# Patient Record
Sex: MALE | Race: WHITE | Employment: OTHER | URBAN - NONMETROPOLITAN AREA
[De-identification: names, ages, dates, MRNs, and addresses within clinical notes are randomized per-mention and may not be internally consistent; named-entity substitution may affect disease eponyms.]

---

## 2017-01-11 ENCOUNTER — OFFICE VISIT (OUTPATIENT)
Dept: FAMILY MEDICINE CLINIC | Age: 62
End: 2017-01-11

## 2017-01-11 VITALS
HEART RATE: 75 BPM | BODY MASS INDEX: 32.38 KG/M2 | HEIGHT: 70 IN | SYSTOLIC BLOOD PRESSURE: 146 MMHG | WEIGHT: 226.2 LBS | DIASTOLIC BLOOD PRESSURE: 86 MMHG | TEMPERATURE: 98.1 F | RESPIRATION RATE: 12 BRPM

## 2017-01-11 DIAGNOSIS — J01.90 ACUTE RHINOSINUSITIS: Primary | ICD-10-CM

## 2017-01-11 PROBLEM — I10 ESSENTIAL HYPERTENSION: Chronic | Status: ACTIVE | Noted: 2017-01-11

## 2017-01-11 PROCEDURE — 99213 OFFICE O/P EST LOW 20 MIN: CPT | Performed by: NURSE PRACTITIONER

## 2017-01-11 RX ORDER — CEFUROXIME AXETIL 250 MG/1
250 TABLET ORAL 2 TIMES DAILY
Qty: 20 TABLET | Refills: 0 | Status: SHIPPED | OUTPATIENT
Start: 2017-01-11 | End: 2017-01-21

## 2017-01-11 RX ORDER — FLUTICASONE PROPIONATE 50 MCG
1 SPRAY, SUSPENSION (ML) NASAL DAILY
Qty: 1 BOTTLE | Refills: 3 | Status: SHIPPED | OUTPATIENT
Start: 2017-01-11 | End: 2017-09-25 | Stop reason: ALTCHOICE

## 2017-01-11 RX ORDER — LOSARTAN POTASSIUM 50 MG/1
50 TABLET ORAL DAILY
Qty: 90 TABLET | Refills: 3 | Status: CANCELLED | OUTPATIENT
Start: 2017-01-11

## 2017-01-18 ENCOUNTER — TELEPHONE (OUTPATIENT)
Dept: FAMILY MEDICINE CLINIC | Age: 62
End: 2017-01-18

## 2017-01-18 DIAGNOSIS — J01.90 ACUTE RHINOSINUSITIS: Primary | ICD-10-CM

## 2017-03-30 RX ORDER — LOSARTAN POTASSIUM 50 MG/1
TABLET ORAL
Qty: 90 TABLET | Refills: 3 | Status: SHIPPED | OUTPATIENT
Start: 2017-03-30 | End: 2017-07-10 | Stop reason: DRUGHIGH

## 2017-05-10 ENCOUNTER — OFFICE VISIT (OUTPATIENT)
Dept: FAMILY MEDICINE CLINIC | Age: 62
End: 2017-05-10

## 2017-05-10 VITALS
WEIGHT: 221.4 LBS | HEART RATE: 99 BPM | SYSTOLIC BLOOD PRESSURE: 138 MMHG | DIASTOLIC BLOOD PRESSURE: 77 MMHG | TEMPERATURE: 99.3 F | BODY MASS INDEX: 31.7 KG/M2 | HEIGHT: 70 IN | RESPIRATION RATE: 16 BRPM

## 2017-05-10 DIAGNOSIS — L02.412 ABSCESS OF AXILLA, LEFT: Primary | ICD-10-CM

## 2017-05-10 PROCEDURE — 99213 OFFICE O/P EST LOW 20 MIN: CPT | Performed by: FAMILY MEDICINE

## 2017-05-10 RX ORDER — SULFAMETHOXAZOLE AND TRIMETHOPRIM 800; 160 MG/1; MG/1
1 TABLET ORAL 2 TIMES DAILY
Qty: 20 TABLET | Refills: 0 | Status: SHIPPED | OUTPATIENT
Start: 2017-05-10 | End: 2017-05-20

## 2017-06-14 ENCOUNTER — OFFICE VISIT (OUTPATIENT)
Dept: FAMILY MEDICINE CLINIC | Age: 62
End: 2017-06-14

## 2017-06-14 VITALS
WEIGHT: 223 LBS | BODY MASS INDEX: 32 KG/M2 | RESPIRATION RATE: 12 BRPM | DIASTOLIC BLOOD PRESSURE: 92 MMHG | TEMPERATURE: 98.5 F | HEART RATE: 88 BPM | SYSTOLIC BLOOD PRESSURE: 160 MMHG

## 2017-06-14 DIAGNOSIS — R09.82 PND (POST-NASAL DRIP): Primary | ICD-10-CM

## 2017-06-14 PROCEDURE — 99213 OFFICE O/P EST LOW 20 MIN: CPT | Performed by: NURSE PRACTITIONER

## 2017-06-14 RX ORDER — FLUTICASONE PROPIONATE 50 MCG
2 SPRAY, SUSPENSION (ML) NASAL DAILY
Qty: 1 BOTTLE | Refills: 3 | Status: SHIPPED | OUTPATIENT
Start: 2017-06-14 | End: 2017-09-25 | Stop reason: ALTCHOICE

## 2017-06-28 ENCOUNTER — TELEPHONE (OUTPATIENT)
Dept: FAMILY MEDICINE CLINIC | Age: 62
End: 2017-06-28

## 2017-06-28 ENCOUNTER — NURSE ONLY (OUTPATIENT)
Dept: FAMILY MEDICINE CLINIC | Age: 62
End: 2017-06-28

## 2017-06-28 VITALS — DIASTOLIC BLOOD PRESSURE: 82 MMHG | SYSTOLIC BLOOD PRESSURE: 150 MMHG

## 2017-06-28 DIAGNOSIS — I10 ESSENTIAL HYPERTENSION: Primary | ICD-10-CM

## 2017-07-10 DIAGNOSIS — I10 ESSENTIAL HYPERTENSION: Primary | Chronic | ICD-10-CM

## 2017-07-10 RX ORDER — LOSARTAN POTASSIUM 100 MG/1
100 TABLET ORAL DAILY
Qty: 90 TABLET | Refills: 3 | Status: SHIPPED | OUTPATIENT
Start: 2017-07-10 | End: 2018-06-24 | Stop reason: SDUPTHER

## 2017-08-03 ENCOUNTER — TELEPHONE (OUTPATIENT)
Dept: FAMILY MEDICINE CLINIC | Age: 62
End: 2017-08-03

## 2017-08-03 ENCOUNTER — OFFICE VISIT (OUTPATIENT)
Dept: FAMILY MEDICINE CLINIC | Age: 62
End: 2017-08-03
Payer: COMMERCIAL

## 2017-08-03 VITALS
DIASTOLIC BLOOD PRESSURE: 74 MMHG | WEIGHT: 231.2 LBS | HEART RATE: 80 BPM | RESPIRATION RATE: 12 BRPM | TEMPERATURE: 99.3 F | BODY MASS INDEX: 33.1 KG/M2 | SYSTOLIC BLOOD PRESSURE: 136 MMHG | HEIGHT: 70 IN

## 2017-08-03 DIAGNOSIS — R73.03 PREDIABETES: Chronic | ICD-10-CM

## 2017-08-03 DIAGNOSIS — I10 ESSENTIAL HYPERTENSION: Primary | Chronic | ICD-10-CM

## 2017-08-03 DIAGNOSIS — E55.9 VITAMIN D DEFICIENCY: ICD-10-CM

## 2017-08-03 DIAGNOSIS — M79.641 RIGHT HAND PAIN: ICD-10-CM

## 2017-08-03 PROCEDURE — 99214 OFFICE O/P EST MOD 30 MIN: CPT | Performed by: FAMILY MEDICINE

## 2017-08-03 ASSESSMENT — PATIENT HEALTH QUESTIONNAIRE - PHQ9
2. FEELING DOWN, DEPRESSED OR HOPELESS: 0
1. LITTLE INTEREST OR PLEASURE IN DOING THINGS: 0
SUM OF ALL RESPONSES TO PHQ9 QUESTIONS 1 & 2: 0
SUM OF ALL RESPONSES TO PHQ QUESTIONS 1-9: 0

## 2017-08-04 LAB — PROSTATE SPECIFIC ANTIGEN: 4.4 NG/ML

## 2017-08-15 ENCOUNTER — OFFICE VISIT (OUTPATIENT)
Dept: FAMILY MEDICINE CLINIC | Age: 62
End: 2017-08-15
Payer: COMMERCIAL

## 2017-08-15 VITALS
HEART RATE: 80 BPM | DIASTOLIC BLOOD PRESSURE: 76 MMHG | WEIGHT: 228 LBS | RESPIRATION RATE: 10 BRPM | HEIGHT: 69 IN | SYSTOLIC BLOOD PRESSURE: 126 MMHG | TEMPERATURE: 98.6 F | BODY MASS INDEX: 33.77 KG/M2

## 2017-08-15 DIAGNOSIS — L02.214 ABSCESS OF GROIN, RIGHT: Primary | ICD-10-CM

## 2017-08-15 PROCEDURE — 99213 OFFICE O/P EST LOW 20 MIN: CPT | Performed by: FAMILY MEDICINE

## 2017-08-15 RX ORDER — SULFAMETHOXAZOLE AND TRIMETHOPRIM 800; 160 MG/1; MG/1
1 TABLET ORAL 2 TIMES DAILY
Qty: 20 TABLET | Refills: 0 | Status: SHIPPED | OUTPATIENT
Start: 2017-08-15 | End: 2017-08-25

## 2017-08-25 ENCOUNTER — PROCEDURE VISIT (OUTPATIENT)
Dept: NEUROLOGY | Age: 62
End: 2017-08-25
Payer: COMMERCIAL

## 2017-08-25 DIAGNOSIS — G56.01 RIGHT CARPAL TUNNEL SYNDROME: Primary | ICD-10-CM

## 2017-08-25 DIAGNOSIS — R20.0 NUMBNESS OF RIGHT HAND: ICD-10-CM

## 2017-08-25 DIAGNOSIS — M79.641 RIGHT HAND PAIN: ICD-10-CM

## 2017-08-25 PROCEDURE — 95909 NRV CNDJ TST 5-6 STUDIES: CPT | Performed by: PSYCHIATRY & NEUROLOGY

## 2017-08-25 PROCEDURE — 95886 MUSC TEST DONE W/N TEST COMP: CPT | Performed by: PSYCHIATRY & NEUROLOGY

## 2017-08-30 ENCOUNTER — TELEPHONE (OUTPATIENT)
Dept: FAMILY MEDICINE CLINIC | Age: 62
End: 2017-08-30

## 2017-09-25 ENCOUNTER — OFFICE VISIT (OUTPATIENT)
Dept: FAMILY MEDICINE CLINIC | Age: 62
End: 2017-09-25
Payer: COMMERCIAL

## 2017-09-25 ENCOUNTER — TELEPHONE (OUTPATIENT)
Dept: FAMILY MEDICINE CLINIC | Age: 62
End: 2017-09-25

## 2017-09-25 VITALS
HEIGHT: 69 IN | WEIGHT: 225 LBS | TEMPERATURE: 98.2 F | DIASTOLIC BLOOD PRESSURE: 77 MMHG | HEART RATE: 80 BPM | BODY MASS INDEX: 33.33 KG/M2 | SYSTOLIC BLOOD PRESSURE: 132 MMHG | RESPIRATION RATE: 12 BRPM

## 2017-09-25 DIAGNOSIS — Z12.11 SCREENING FOR COLON CANCER: ICD-10-CM

## 2017-09-25 DIAGNOSIS — G56.01 CARPAL TUNNEL SYNDROME OF RIGHT WRIST: ICD-10-CM

## 2017-09-25 DIAGNOSIS — K64.0 FIRST DEGREE HEMORRHOIDS: Primary | ICD-10-CM

## 2017-09-25 PROCEDURE — 99214 OFFICE O/P EST MOD 30 MIN: CPT | Performed by: FAMILY MEDICINE

## 2017-09-27 ENCOUNTER — TELEPHONE (OUTPATIENT)
Dept: FAMILY MEDICINE CLINIC | Age: 62
End: 2017-09-27

## 2017-09-27 DIAGNOSIS — Z53.20 COLONOSCOPY REFUSED: ICD-10-CM

## 2017-09-27 DIAGNOSIS — Z12.11 SCREENING FOR COLON CANCER: Primary | ICD-10-CM

## 2017-09-27 DIAGNOSIS — K64.9 HEMORRHOIDS, UNSPECIFIED HEMORRHOID TYPE: ICD-10-CM

## 2017-09-27 RX ORDER — MUPIROCIN CALCIUM 20 MG/G
CREAM TOPICAL
Qty: 1 TUBE | Refills: 0 | Status: SHIPPED | OUTPATIENT
Start: 2017-09-27 | End: 2017-10-27

## 2017-09-29 ENCOUNTER — TELEPHONE (OUTPATIENT)
Dept: FAMILY MEDICINE CLINIC | Age: 62
End: 2017-09-29

## 2017-10-12 DIAGNOSIS — K64.0 FIRST DEGREE HEMORRHOIDS: ICD-10-CM

## 2017-10-12 NOTE — TELEPHONE ENCOUNTER
Mike Dominguez called requesting a refill on the following medications:  Requested Prescriptions     Pending Prescriptions Disp Refills    hydrocortisone (ANUSOL-HC) 2.5 % rectal cream 1 Tube 0     Sig: Place rectally 2 times daily for the next few days     Pharmacy verified:  [email or web address]      Date of last visit: 9/25/17  Date of next visit (if applicable): Visit date not found        Date of last fill and quantity (to be completed by clinical staff)  [facility] name: [facility]

## 2017-10-25 ENCOUNTER — TELEPHONE (OUTPATIENT)
Dept: FAMILY MEDICINE CLINIC | Age: 62
End: 2017-10-25

## 2017-12-01 LAB
AVERAGE GLUCOSE: NORMAL
CHOLESTEROL, TOTAL: 170 MG/DL
CHOLESTEROL/HDL RATIO: NORMAL
CREATININE, URINE: 15.5
HBA1C MFR BLD: 5.9 %
HDLC SERPL-MCNC: 41 MG/DL (ref 35–70)
LDL CHOLESTEROL CALCULATED: 112 MG/DL (ref 0–160)
MICROALBUMIN/CREAT 24H UR: 16.9 MG/G{CREAT}
MICROALBUMIN/CREAT UR-RTO: 108.7
TRIGL SERPL-MCNC: 87 MG/DL
VLDLC SERPL CALC-MCNC: 17 MG/DL

## 2017-12-04 ENCOUNTER — TELEPHONE (OUTPATIENT)
Dept: FAMILY MEDICINE CLINIC | Age: 62
End: 2017-12-04

## 2017-12-04 NOTE — TELEPHONE ENCOUNTER
----- Message from Nell Royal, DO sent at 12/4/2017 12:05 PM EST -----  Please let pt know that labs are stable and appropriate. No concerning findings. Let me know if questions, thanks!

## 2017-12-14 ENCOUNTER — NURSE ONLY (OUTPATIENT)
Dept: FAMILY MEDICINE CLINIC | Age: 62
End: 2017-12-14
Payer: COMMERCIAL

## 2017-12-14 DIAGNOSIS — Z23 NEED FOR INFLUENZA VACCINATION: Primary | ICD-10-CM

## 2017-12-14 PROCEDURE — 90686 IIV4 VACC NO PRSV 0.5 ML IM: CPT | Performed by: FAMILY MEDICINE

## 2017-12-14 PROCEDURE — 90471 IMMUNIZATION ADMIN: CPT | Performed by: FAMILY MEDICINE

## 2017-12-14 NOTE — PROGRESS NOTES
After obtaining consent, and per orders of Dr. Florentino Bailey , injection of Flu Vaccine 0.5ml I.M. given in left deltoid by Anay Hughes LPN. Patient instructed to report any adverse reaction to me immediately. Most recent Vaccine Information Sheet dated 08/07/15   given to pt.

## 2018-01-18 ENCOUNTER — TELEPHONE (OUTPATIENT)
Dept: FAMILY MEDICINE CLINIC | Age: 63
End: 2018-01-18

## 2018-01-18 NOTE — TELEPHONE ENCOUNTER
2nd attempt to contact the pt re:overdue labs Dr Sandy Nguyen ordered on 9/27/17. HIPAA form is up to date, order mailed.

## 2018-02-01 ENCOUNTER — TELEPHONE (OUTPATIENT)
Dept: FAMILY MEDICINE CLINIC | Age: 63
End: 2018-02-01
Payer: COMMERCIAL

## 2018-02-01 DIAGNOSIS — Z53.20 COLONOSCOPY REFUSED: Primary | ICD-10-CM

## 2018-02-01 LAB
CONTROL: PRESENT
HEMOCCULT STL QL: NEGATIVE

## 2018-02-01 PROCEDURE — 82274 ASSAY TEST FOR BLOOD FECAL: CPT | Performed by: FAMILY MEDICINE

## 2018-02-01 NOTE — TELEPHONE ENCOUNTER
Patient returned FIT test to office. Test performed and results were negative. Results recorded to pt's chart and HM updated. Encounter routed to Dr Ashkan Fernandez for further review.

## 2018-03-30 DIAGNOSIS — K64.0 FIRST DEGREE HEMORRHOIDS: ICD-10-CM

## 2018-06-13 ENCOUNTER — OFFICE VISIT (OUTPATIENT)
Dept: FAMILY MEDICINE CLINIC | Age: 63
End: 2018-06-13
Payer: COMMERCIAL

## 2018-06-13 VITALS
SYSTOLIC BLOOD PRESSURE: 128 MMHG | DIASTOLIC BLOOD PRESSURE: 74 MMHG | BODY MASS INDEX: 34.39 KG/M2 | RESPIRATION RATE: 12 BRPM | WEIGHT: 232.2 LBS | TEMPERATURE: 98.2 F | HEIGHT: 69 IN | HEART RATE: 94 BPM

## 2018-06-13 DIAGNOSIS — K64.9 HEMORRHOIDS, UNSPECIFIED HEMORRHOID TYPE: Primary | ICD-10-CM

## 2018-06-13 PROCEDURE — 99213 OFFICE O/P EST LOW 20 MIN: CPT | Performed by: FAMILY MEDICINE

## 2018-06-24 DIAGNOSIS — I10 ESSENTIAL HYPERTENSION: Chronic | ICD-10-CM

## 2018-06-25 RX ORDER — LOSARTAN POTASSIUM 100 MG/1
TABLET ORAL
Qty: 90 TABLET | Refills: 3 | Status: SHIPPED | OUTPATIENT
Start: 2018-06-25 | End: 2019-08-23 | Stop reason: SDUPTHER

## 2018-08-01 ENCOUNTER — TELEPHONE (OUTPATIENT)
Dept: FAMILY MEDICINE CLINIC | Age: 63
End: 2018-08-01

## 2018-08-01 NOTE — TELEPHONE ENCOUNTER
Patient is due to see Dr Lukasz Thomson on 8/3/18 for his yearly appointment. He is calling in asking if he needs to have any bloodwork done prior to the appointment. Please call him back to advise, and please leave him a detailed msg if he can't answer.

## 2018-08-03 ENCOUNTER — OFFICE VISIT (OUTPATIENT)
Dept: FAMILY MEDICINE CLINIC | Age: 63
End: 2018-08-03
Payer: COMMERCIAL

## 2018-08-03 VITALS
DIASTOLIC BLOOD PRESSURE: 70 MMHG | WEIGHT: 230 LBS | HEART RATE: 88 BPM | SYSTOLIC BLOOD PRESSURE: 132 MMHG | RESPIRATION RATE: 12 BRPM | HEIGHT: 70 IN | TEMPERATURE: 98.6 F | BODY MASS INDEX: 32.93 KG/M2

## 2018-08-03 DIAGNOSIS — R73.03 PREDIABETES: Chronic | ICD-10-CM

## 2018-08-03 DIAGNOSIS — E66.9 OBESITY (BMI 30-39.9): ICD-10-CM

## 2018-08-03 DIAGNOSIS — I10 ESSENTIAL HYPERTENSION: Primary | Chronic | ICD-10-CM

## 2018-08-03 DIAGNOSIS — Z12.5 SPECIAL SCREENING FOR MALIGNANT NEOPLASM OF PROSTATE: ICD-10-CM

## 2018-08-03 DIAGNOSIS — R73.9 HYPERGLYCEMIA: ICD-10-CM

## 2018-08-03 DIAGNOSIS — E55.9 VITAMIN D DEFICIENCY: ICD-10-CM

## 2018-08-03 PROCEDURE — 99214 OFFICE O/P EST MOD 30 MIN: CPT | Performed by: FAMILY MEDICINE

## 2018-08-03 NOTE — PATIENT INSTRUCTIONS
LAB INSTRUCTIONS:    Please complete labs within 6 week(s). Please fast for 8 hours prior to lab collection. The clinic will call you within 1 week of collection. If you have not heard from us within that amount of time, please call us at [phone]. Patient Education        High Blood Pressure: Care Instructions  Your Care Instructions    If your blood pressure is usually above 130/80, you have high blood pressure, or hypertension. That means the top number is 130 or higher or the bottom number is 80 or higher, or both. Despite what a lot of people think, high blood pressure usually doesn't cause headaches or make you feel dizzy or lightheaded. It usually has no symptoms. But it does increase your risk for heart attack, stroke, and kidney or eye damage. The higher your blood pressure, the more your risk increases. Your doctor will give you a goal for your blood pressure. Your goal will be based on your health and your age. Lifestyle changes, such as eating healthy and being active, are always important to help lower blood pressure. You might also take medicine to reach your blood pressure goal.  Follow-up care is a key part of your treatment and safety. Be sure to make and go to all appointments, and call your doctor if you are having problems. It's also a good idea to know your test results and keep a list of the medicines you take. How can you care for yourself at home? Medical treatment  · If you stop taking your medicine, your blood pressure will go back up. You may take one or more types of medicine to lower your blood pressure. Be safe with medicines. Take your medicine exactly as prescribed. Call your doctor if you think you are having a problem with your medicine. · Talk to your doctor before you start taking aspirin every day. Aspirin can help certain people lower their risk of a heart attack or stroke.  But taking aspirin isn't right for everyone, because it can cause serious bleeding. · See your doctor regularly. You may need to see the doctor more often at first or until your blood pressure comes down. · If you are taking blood pressure medicine, talk to your doctor before you take decongestants or anti-inflammatory medicine, such as ibuprofen. Some of these medicines can raise blood pressure. · Learn how to check your blood pressure at home. Lifestyle changes  · Stay at a healthy weight. This is especially important if you put on weight around the waist. Losing even 10 pounds can help you lower your blood pressure. · If your doctor recommends it, get more exercise. Walking is a good choice. Bit by bit, increase the amount you walk every day. Try for at least 30 minutes on most days of the week. You also may want to swim, bike, or do other activities. · Avoid or limit alcohol. Talk to your doctor about whether you can drink any alcohol. · Try to limit how much sodium you eat to less than 2,300 milligrams (mg) a day. Your doctor may ask you to try to eat less than 1,500 mg a day. · Eat plenty of fruits (such as bananas and oranges), vegetables, legumes, whole grains, and low-fat dairy products. · Lower the amount of saturated fat in your diet. Saturated fat is found in animal products such as milk, cheese, and meat. Limiting these foods may help you lose weight and also lower your risk for heart disease. · Do not smoke. Smoking increases your risk for heart attack and stroke. If you need help quitting, talk to your doctor about stop-smoking programs and medicines. These can increase your chances of quitting for good. When should you call for help? Call 911 anytime you think you may need emergency care. This may mean having symptoms that suggest that your blood pressure is causing a serious heart or blood vessel problem. Your blood pressure may be over 180/110.   For example, call 911 if:    · You have symptoms of a heart attack.  These may include:  ¨ Chest pain or pressure, Health. If you have questions about a medical condition or this instruction, always ask your healthcare professional. [place] [address] any warranty or liability for your use of this information.

## 2018-08-03 NOTE — PROGRESS NOTES
Chief Complaint   Patient presents with    Follow-up     1 year f/u chronic medical conditions        History obtained from the patient. SUBJECTIVE:  Inez Perez is a 57 y.o. male that presents today for       -01. HTN:    HPI:    Taking meds as prescribed ?: yes  Tolerating well ?: yes  Side Effects ?: denies  BP at home ?: <140/90  Working on TLCS ?: yes  Chest Pain/SOB/Palpitations? denies    BP Readings from Last 3 Encounters:   08/03/18 132/70   06/13/18 128/74   09/25/17 132/77       -02. PreDM/Obesity: working on wt loss. Diet a bit better, due for labs.       -03. Vit D def: hx of vit d def, due for f/u labs. Age/Gender Health Maintenance    Lipid -   Lab Results   Component Value Date    CHOL 170 12/01/2017    CHOL 156 06/22/2016    CHOL 164 10/07/2015     Lab Results   Component Value Date    TRIG 87 12/01/2017    TRIG 65 06/22/2016    TRIG 53 10/07/2015     Lab Results   Component Value Date    HDL 41 12/01/2017    HDL 46 06/22/2016    HDL 49 10/07/2015     Lab Results   Component Value Date    LDLCALC 112 12/01/2017    LDLCALC 97 06/22/2016    LDLCALC 104 10/07/2015     Lab Results   Component Value Date    VLDL 17 12/01/2017    VLDL 13 06/22/2016    VLDL 11 10/07/2015     No results found for: CHOLHDLRATIO      DM Screen - 113 (OCT 2015)/110 (JUNE 2016)/100 (DEC 2017)    With a1c 6.1 (SEPT 2017)    TSH - 4.090 (DEC 2017)    Lab Results   Component Value Date    TSH 1.590 06/22/2016       Colon Cancer Screening - NEG FIT FEB 2018  Lung Cancer Screening (Age 54 to [de-identified] with 30 pack year hx, current smoker or quit within past 15 years) - never smoker    Tetanus - UTD July 2015  Influenza Vaccine - Candidate FALL 2017  Pneumonia Vaccine - Age 72  Zostavax - due, pt to check with insurance.      PSA testing discussion -   Lab Results   Component Value Date    PSA 4.4 08/04/2017    PSA 3.6 06/22/2016    PSA 4.00 07/27/2013     AAA Screening - never smoker    Falls screening - N/A      Current Outpatient Prescriptions   Medication Sig Dispense Refill    losartan (COZAAR) 100 MG tablet TAKE 1 TABLET DAILY 90 tablet 3    PROCTO-MED HC 2.5 % rectal cream PLACE CREAM RECTALLY TWICE DAILY FOR THE NEXT FEW DAYS 1 Tube 2    Multiple Vitamins-Minerals (MULTIVITAMIN PO) Take by mouth daily      nystatin (MYCOSTATIN) 474406 UNIT/GM cream Apply topically as needed Apply topically 2 times daily.  VIAGRA 100 MG TABS        No current facility-administered medications for this visit. No orders of the defined types were placed in this encounter. All medications reviewed and reconciled, including OTC and herbal medications. Updated list given to patient. Patient Active Problem List   Diagnosis    Dermatitis    Hypertension    Erectile dysfunction    Benign prostatic hyperplasia    Prediabetes    Vitamin D deficiency    Right hand pain    Carpal tunnel syndrome of right wrist         Past Medical History:   Diagnosis Date    BPH (benign prostatic hypertrophy) 7/17/2015    Dermatitis     follows with Dr. Deja trevino Erectile dysfunction     Hypertension     Prediabetes 7/29/2016         Past Surgical History:   Procedure Laterality Date    PROSTATE SURGERY  2009             No Known Allergies      Social History     Social History    Marital status:      Spouse name: N/A    Number of children: N/A    Years of education: N/A     Occupational History    Not on file.      Social History Main Topics    Smoking status: Never Smoker    Smokeless tobacco: Never Used    Alcohol use No    Drug use: No    Sexual activity: Not on file     Other Topics Concern    Not on file     Social History Narrative    No narrative on file       Family History   Problem Relation Age of Onset    High Blood Pressure Mother     Cancer Father         Lung Cancer    Colon Cancer Neg Hx     Prostate Cancer Neg Hx          I have reviewed the patient's past medical history, past surgical history, allergies, medications, social and family history and I have made updates where appropriate. Review of Systems  Positive responses are highlighted in bold    Constitutional:  Fever, Chills, Night Sweats, Fatigue, Unexpected changes in weight  HENT:  Ear pain, Tinnitus, Nosebleeds, Trouble swallowing, Hearing loss, Sore throat  Cardiovascular:  Chest Pain, Palpitations, Orthopnea, Paroxysmal Nocturnal Dyspnea  Respiratory:  Cough, Wheezing, Shortness of breath, Chest tightness, Apnea  Gastrointestinal:  Nausea, Vomiting, Diarrhea, Constipation, Heartburn, Blood in stool  Genitourinary:  Difficulty or painful urination, Flank pain, Change in frequency, Urgency  Skin:  Color change, Rash, Itching, Wound  Musculoskeletal:  Joint pain, Back pain, Gait problems, Joint swelling, Myalgias  Neurological:  Dizziness, Headaches, Presyncope, Numbness, Seizures, Tremors  Endocrine:  Heat Intolerance, Cold Intolerance, Polydipsia, Polyphagia, Polyuria      PHYSICAL EXAM:  Vitals:    08/03/18 0743 08/03/18 0753   BP: (!) 152/70 132/70   Pulse: 88    Resp: 12    Temp: 98.6 °F (37 °C)    Weight: 230 lb (104.3 kg)    Height: 5' 10\" (1.778 m)      Body mass index is 33 kg/m². Pain Score:   0 - No pain    VS Reviewed  General Appearance: A&O x 3, No acute distress,well developed and well- nourished  Eyes: pupils equal, round, and reactive to light, extraocular eye movements intact, conjunctivae and eye lids without erythema  ENT: external ear and ear canal clear bilaterally, TMs intact and regular, nose without deformity, nasal mucosa and turbinates normal without polyps, oropharynx normal, dentition is normal for age  Neck: supple and non-tender without mass, no thyromegaly or thyroid nodules, no cervical lymphadenopathy  Pulmonary/Chest: clear to auscultation bilaterally- no wheezes, rales or rhonchi, normal air movement, no respiratory distress or retractions  Cardiovascular: S1 and S2 auscultated w/ RRR.  No murmurs, rubs, clicks, or gallops, distal pulses intact. Abdomen: soft, non-tender, non-distended, bowl sounds physiologic,  no rebound or guarding, no masses or hernias noted. Liver and spleen without enlargement. Extremities: no cyanosis, clubbing or edema of the lower extremities. Skin: warm and dry, no rash or erythema      ASSESSMENT & PLAN  1. Essential hypertension    At goal  con't meds  Due for labs, ordered  F/u annually and prn    - Comprehensive Metabolic Panel; Future  - Lipid Panel; Future  - Microalbumin / Creatinine Urine Ratio; Future  - TSH with Reflex; Future    2. Prediabetes    con't to work on life style changes  Labs ordered    - Comprehensive Metabolic Panel; Future  - Hemoglobin A1C; Future    3. Hyperglycemia    - Comprehensive Metabolic Panel; Future  - Hemoglobin A1C; Future    4. Obesity (BMI 30-39.9)    con't with wt loss strategies    5. Vitamin D deficiency    Check labs  txt if low    - Vitamin D 25 Hydroxy; Future    6. Special screening for malignant neoplasm of prostate    Discussed prostate screening guidelines and with shared decision making, he elects to proceed with psa    - Psa screening; Future      DISPOSITION    Return in about 1 year (around 8/3/2019) for follow-up on chronic medical conditions, sooner as needed. Mak Collins released without restrictions. Future Appointments  Date Time Provider Ju [place]   8/9/2019 7:40 AM Dolly Valiente DO [address] received counseling on the following healthy behaviors: nutrition, exercise and medication adherence    Patient given educational materials on: See Attached    I have instructed Mak Collins to complete a self tracking handout on Blood Pressures  and Weights and instructed them to bring it with them to his next appointment. Barriers to learning and self management: none    Discussed use, benefit, and side effects of prescribed medications.   Barriers to medication compliance addressed. All patient questions answered. Pt voiced understanding.        Electronically signed by Rogerio Marks DO on 8/3/2018 at 8:00 AM

## 2018-08-10 LAB
AVERAGE GLUCOSE: NORMAL
CHOLESTEROL, TOTAL: 166 MG/DL
CHOLESTEROL/HDL RATIO: NORMAL
HBA1C MFR BLD: 6 %
HDLC SERPL-MCNC: 36 MG/DL (ref 35–70)
LDL CHOLESTEROL CALCULATED: 109 MG/DL (ref 0–160)
PROSTATE SPECIFIC ANTIGEN: 5.3 NG/ML
TRIGL SERPL-MCNC: 104 MG/DL
VLDLC SERPL CALC-MCNC: 21 MG/DL

## 2018-08-13 DIAGNOSIS — R73.9 HYPERGLYCEMIA: ICD-10-CM

## 2018-08-13 DIAGNOSIS — R73.03 PREDIABETES: Primary | ICD-10-CM

## 2018-08-14 ENCOUNTER — TELEPHONE (OUTPATIENT)
Dept: FAMILY MEDICINE CLINIC | Age: 63
End: 2018-08-14

## 2018-08-14 NOTE — TELEPHONE ENCOUNTER
Left detailed msg informing pt of results. (ok per signed HIPAA) lab mailed to pt. [facility] message sent as well.

## 2018-08-14 NOTE — TELEPHONE ENCOUNTER
----- Message from Gabby Powers DO sent at 8/13/2018  7:54 PM EDT -----  Please let pt know that labs overall good. Blood sugar still in the prediabetic range, but stable. Recommend we repeat FBS/a1c in 6 months, fasting. Let me know if questions, thanks!

## 2018-10-25 ENCOUNTER — TELEPHONE (OUTPATIENT)
Dept: FAMILY MEDICINE CLINIC | Age: 63
End: 2018-10-25

## 2018-11-08 ENCOUNTER — NURSE ONLY (OUTPATIENT)
Dept: FAMILY MEDICINE CLINIC | Age: 63
End: 2018-11-08
Payer: MEDICARE

## 2018-11-08 PROCEDURE — 90686 IIV4 VACC NO PRSV 0.5 ML IM: CPT | Performed by: FAMILY MEDICINE

## 2018-11-08 PROCEDURE — G0008 ADMIN INFLUENZA VIRUS VAC: HCPCS | Performed by: FAMILY MEDICINE

## 2019-02-18 ENCOUNTER — TELEPHONE (OUTPATIENT)
Dept: FAMILY MEDICINE CLINIC | Age: 64
End: 2019-02-18

## 2019-02-18 ENCOUNTER — NURSE ONLY (OUTPATIENT)
Dept: FAMILY MEDICINE CLINIC | Age: 64
End: 2019-02-18

## 2019-02-18 VITALS — SYSTOLIC BLOOD PRESSURE: 170 MMHG | HEART RATE: 82 BPM | DIASTOLIC BLOOD PRESSURE: 90 MMHG

## 2019-02-18 DIAGNOSIS — I10 ESSENTIAL HYPERTENSION: Primary | Chronic | ICD-10-CM

## 2019-02-18 DIAGNOSIS — I10 ESSENTIAL HYPERTENSION: Primary | ICD-10-CM

## 2019-02-18 RX ORDER — AMLODIPINE BESYLATE 5 MG/1
5 TABLET ORAL DAILY
Qty: 90 TABLET | Refills: 1 | Status: SHIPPED | OUTPATIENT
Start: 2019-02-18 | End: 2019-03-29 | Stop reason: DRUGHIGH

## 2019-03-22 ENCOUNTER — TELEPHONE (OUTPATIENT)
Dept: FAMILY MEDICINE CLINIC | Age: 64
End: 2019-03-22

## 2019-03-29 ENCOUNTER — TELEPHONE (OUTPATIENT)
Dept: FAMILY MEDICINE CLINIC | Age: 64
End: 2019-03-29

## 2019-03-29 ENCOUNTER — NURSE ONLY (OUTPATIENT)
Dept: FAMILY MEDICINE CLINIC | Age: 64
End: 2019-03-29

## 2019-03-29 VITALS — DIASTOLIC BLOOD PRESSURE: 80 MMHG | SYSTOLIC BLOOD PRESSURE: 144 MMHG

## 2019-03-29 DIAGNOSIS — I10 ESSENTIAL HYPERTENSION: Primary | ICD-10-CM

## 2019-03-29 RX ORDER — AMLODIPINE BESYLATE 5 MG/1
10 TABLET ORAL DAILY
COMMUNITY
End: 2019-04-24 | Stop reason: SDUPTHER

## 2019-04-05 ENCOUNTER — OFFICE VISIT (OUTPATIENT)
Dept: FAMILY MEDICINE CLINIC | Age: 64
End: 2019-04-05
Payer: MEDICARE

## 2019-04-05 VITALS
BODY MASS INDEX: 33.77 KG/M2 | SYSTOLIC BLOOD PRESSURE: 138 MMHG | HEART RATE: 88 BPM | TEMPERATURE: 97.4 F | WEIGHT: 228 LBS | DIASTOLIC BLOOD PRESSURE: 70 MMHG | HEIGHT: 69 IN | RESPIRATION RATE: 16 BRPM

## 2019-04-05 DIAGNOSIS — J01.90 ACUTE RHINOSINUSITIS: Primary | ICD-10-CM

## 2019-04-05 DIAGNOSIS — I10 ESSENTIAL HYPERTENSION: Chronic | ICD-10-CM

## 2019-04-05 PROCEDURE — G8427 DOCREV CUR MEDS BY ELIG CLIN: HCPCS | Performed by: FAMILY MEDICINE

## 2019-04-05 PROCEDURE — 3017F COLORECTAL CA SCREEN DOC REV: CPT | Performed by: FAMILY MEDICINE

## 2019-04-05 PROCEDURE — 4040F PNEUMOC VAC/ADMIN/RCVD: CPT | Performed by: FAMILY MEDICINE

## 2019-04-05 PROCEDURE — 1036F TOBACCO NON-USER: CPT | Performed by: FAMILY MEDICINE

## 2019-04-05 PROCEDURE — 1123F ACP DISCUSS/DSCN MKR DOCD: CPT | Performed by: FAMILY MEDICINE

## 2019-04-05 PROCEDURE — G8417 CALC BMI ABV UP PARAM F/U: HCPCS | Performed by: FAMILY MEDICINE

## 2019-04-05 PROCEDURE — 99213 OFFICE O/P EST LOW 20 MIN: CPT | Performed by: FAMILY MEDICINE

## 2019-04-05 RX ORDER — BENZONATATE 100 MG/1
CAPSULE ORAL
Qty: 60 CAPSULE | Refills: 0 | Status: SHIPPED | OUTPATIENT
Start: 2019-04-05 | End: 2019-04-15

## 2019-04-05 RX ORDER — DOXYCYCLINE HYCLATE 100 MG/1
100 CAPSULE ORAL 2 TIMES DAILY
Qty: 20 CAPSULE | Refills: 0 | Status: SHIPPED | OUTPATIENT
Start: 2019-04-05 | End: 2019-04-15

## 2019-04-05 RX ORDER — FLUTICASONE PROPIONATE 50 MCG
1 SPRAY, SUSPENSION (ML) NASAL DAILY
Qty: 1 BOTTLE | Refills: 0 | Status: SHIPPED | OUTPATIENT
Start: 2019-04-05 | End: 2019-11-25 | Stop reason: SDUPTHER

## 2019-04-05 ASSESSMENT — PATIENT HEALTH QUESTIONNAIRE - PHQ9
2. FEELING DOWN, DEPRESSED OR HOPELESS: 0
SUM OF ALL RESPONSES TO PHQ QUESTIONS 1-9: 0
1. LITTLE INTEREST OR PLEASURE IN DOING THINGS: 0
SUM OF ALL RESPONSES TO PHQ9 QUESTIONS 1 & 2: 0
SUM OF ALL RESPONSES TO PHQ QUESTIONS 1-9: 0

## 2019-04-05 NOTE — PROGRESS NOTES
Screening (Age 54 to [de-identified] with 30 pack year hx, current smoker or quit within past 15 years) - never smoker    Tetanus - UTD 2015  Influenza Vaccine - Candidate 2019  Pneumonia Vaccine - discuss next visit  Zostavax - due, pt to check with insurance. PSA testing discussion -   Lab Results   Component Value Date    PSA 4.4 2017    PSA 3.6 2016    PSA 4.00 2013     AAA Screening - never smoker      Current Outpatient Medications   Medication Sig Dispense Refill    benzonatate (TESSALON PERLES) 100 MG capsule Take 1 to 2 tablets by mouth every 8 hours as needed for cough. 60 capsule 0    fluticasone (FLONASE) 50 MCG/ACT nasal spray 1 spray by Nasal route daily for 14 days 1 Bottle 0    doxycycline hyclate (VIBRAMYCIN) 100 MG capsule Take 1 capsule by mouth 2 times daily for 10 days 20 capsule 0    amLODIPine (NORVASC) 5 MG tablet Take 10 mg by mouth daily      losartan (COZAAR) 100 MG tablet TAKE 1 TABLET DAILY 90 tablet 3    PROCTO-MED HC 2.5 % rectal cream PLACE CREAM RECTALLY TWICE DAILY FOR THE NEXT FEW DAYS 1 Tube 2    Multiple Vitamins-Minerals (MULTIVITAMIN PO) Take by mouth daily      nystatin (MYCOSTATIN) 746888 UNIT/GM cream Apply topically as needed Apply topically 2 times daily.  VIAGRA 100 MG TABS        No current facility-administered medications for this visit. Orders Placed This Encounter   Medications    benzonatate (TESSALON PERLES) 100 MG capsule     Sig: Take 1 to 2 tablets by mouth every 8 hours as needed for cough. Dispense:  60 capsule     Refill:  0    fluticasone (FLONASE) 50 MCG/ACT nasal spray     Si spray by Nasal route daily for 14 days     Dispense:  1 Bottle     Refill:  0    doxycycline hyclate (VIBRAMYCIN) 100 MG capsule     Sig: Take 1 capsule by mouth 2 times daily for 10 days     Dispense:  20 capsule     Refill:  0         All medications reviewed and reconciled, including OTC and herbal medications.  Updated list given to patient. Patient Active Problem List   Diagnosis    Dermatitis    Hypertension    Erectile dysfunction    Benign prostatic hyperplasia    Prediabetes    Vitamin D deficiency    Right hand pain    Carpal tunnel syndrome of right wrist         Past Medical History:   Diagnosis Date    BPH (benign prostatic hypertrophy) 7/17/2015    Dermatitis     follows with Dr. Mansi trevino Seat Erectile dysfunction     Hypertension     Prediabetes 7/29/2016         Past Surgical History:   Procedure Laterality Date    PROSTATE SURGERY  2009             No Known Allergies      Social History     Tobacco Use    Smoking status: Never Smoker    Smokeless tobacco: Never Used   Substance Use Topics    Alcohol use: No       Family History   Problem Relation Age of Onset    High Blood Pressure Mother     Cancer Father         Lung Cancer    Colon Cancer Neg Hx     Prostate Cancer Neg Hx          I have reviewed the patient's past medical history, past surgical history, allergies, medications, social and family history and I have made updates where appropriate.       Review of Systems  Positive responses are highlighted in bold    Constitutional:  Fever, Chills, Night Sweats, Fatigue, Unexpected changes in weight  HENT:  Ear pain, Tinnitus, Nosebleeds, Trouble swallowing, Hearing loss, Sore throat  Cardiovascular:  Chest Pain, Palpitations, Orthopnea, Paroxysmal Nocturnal Dyspnea  Respiratory:  Cough, Wheezing, Shortness of breath, Chest tightness, Apnea  Gastrointestinal:  Nausea, Vomiting, Diarrhea, Constipation, Heartburn, Blood in stool  Genitourinary:  Difficulty or painful urination, Flank pain, Change in frequency, Urgency  Skin:  Color change, Rash, Itching, Wound  Musculoskeletal:  Joint pain, Back pain, Gait problems, Joint swelling, Myalgias  Neurological:  Dizziness, Headaches, Presyncope, Numbness, Seizures, Tremors  Endocrine:  Heat Intolerance, Cold Intolerance, Polydipsia, Polyphagia, Polyuria      PHYSICAL EXAM:  Vitals:    04/05/19 0917 04/05/19 0926   BP: (!) 144/70 138/70   Pulse: 88    Resp: 16    Temp: 97.4 °F (36.3 °C)    Weight: 228 lb (103.4 kg)    Height: 5' 9\" (1.753 m)      Body mass index is 33.67 kg/m². Pain Score:   0 - No pain    VS Reviewed  General Appearance: A&O x 3, No acute distress,well developed and well- nourished  Eyes: pupils equal, round, and reactive to light, extraocular eye movements intact, conjunctivae and eye lids without erythema  ENT: bilateral TM normal without fluid or infection, neck without nodes, throat normal without erythema or exudate, sinuses nontender, post nasal drip noted, nasal mucosa congested and Oropharynx clear, without erythema, exudate, or thrush. Neck: supple and non-tender without mass, no thyromegaly or thyroid nodules, no cervical lymphadenopathy  Pulmonary/Chest: clear to auscultation bilaterally- no wheezes, rales or rhonchi, normal air movement, no respiratory distress or retractions  Cardiovascular: S1 and S2 auscultated w/ RRR. No murmurs, rubs, clicks, or gallops, distal pulses intact. Abdomen: soft, non-tender, non-distended, bowl sounds physiologic,  no rebound or guarding, no masses or hernias noted. Liver and spleen without enlargement. Extremities: no cyanosis, clubbing or edema of the lower extremities. Skin: warm and dry, no rash or erythema      ASSESSMENT & PLAN  1. Acute rhinosinusitis    VSS  Exam reassuring  F/u if no better  Reviewed ER precautions, pt understands. - benzonatate (TESSALON PERLES) 100 MG capsule; Take 1 to 2 tablets by mouth every 8 hours as needed for cough. Dispense: 60 capsule; Refill: 0  - fluticasone (FLONASE) 50 MCG/ACT nasal spray; 1 spray by Nasal route daily for 14 days  Dispense: 1 Bottle; Refill: 0  - doxycycline hyclate (VIBRAMYCIN) 100 MG capsule; Take 1 capsule by mouth 2 times daily for 10 days  Dispense: 20 capsule; Refill: 0    2.  Essential hypertension    Improving  con't norvasc and cozaar  F/u for BP recheck next wk as planned. DISPOSITION    Return if symptoms worsen or fail to improve. Tim Sun released without restrictions. Future Appointments   Date Time Provider Ju Urbina   4/12/2019  8:40 AM SCHEDULE, NURSE [place]   8/9/2019  7:40 AM Denise Villar DO [address] received counseling on the following healthy behaviors: nutrition, exercise and medication adherence    Patient given educational materials on: See Attached    I have instructed Tim Sun to complete a self tracking handout on Blood Pressures  and Weights and instructed them to bring it with them to his next appointment. Barriers to learning and self management: none    Discussed use, benefit, and side effects of prescribed medications. Barriers to medication compliance addressed. All patient questions answered. Pt voiced understanding.        Electronically signed by Denise Villar DO on 4/5/2019 at 9:36 AM

## 2019-04-05 NOTE — PATIENT INSTRUCTIONS
Patient Education        Sinusitis: Care Instructions  Your Care Instructions    Sinusitis is an infection of the lining of the sinus cavities in your head. Sinusitis often follows a cold. It causes pain and pressure in your head and face. In most cases, sinusitis gets better on its own in 1 to 2 weeks. But some mild symptoms may last for several weeks. Sometimes antibiotics are needed. Follow-up care is a key part of your treatment and safety. Be sure to make and go to all appointments, and call your doctor if you are having problems. It's also a good idea to know your test results and keep a list of the medicines you take. How can you care for yourself at home? · Take an over-the-counter pain medicine, such as acetaminophen (Tylenol), ibuprofen (Advil, Motrin), or naproxen (Aleve). Read and follow all instructions on the label. · If the doctor prescribed antibiotics, take them as directed. Do not stop taking them just because you feel better. You need to take the full course of antibiotics. · Be careful when taking over-the-counter cold or flu medicines and Tylenol at the same time. Many of these medicines have acetaminophen, which is Tylenol. Read the labels to make sure that you are not taking more than the recommended dose. Too much acetaminophen (Tylenol) can be harmful. · Breathe warm, moist air from a steamy shower, a hot bath, or a sink filled with hot water. Avoid cold, dry air. Using a humidifier in your home may help. Follow the directions for cleaning the machine. · Use saline (saltwater) nasal washes to help keep your nasal passages open and wash out mucus and bacteria. You can buy saline nose drops at a grocery store or drugstore. Or you can make your own at home by adding 1 teaspoon of salt and 1 teaspoon of baking soda to 2 cups of distilled water. If you make your own, fill a bulb syringe with the solution, insert the tip into your nostril, and squeeze gently. Alessandra Fiorella your nose.   · Put a hot, wet towel or a warm gel pack on your face 3 or 4 times a day for 5 to 10 minutes each time. · Try a decongestant nasal spray like oxymetazoline (Afrin). Do not use it for more than 3 days in a row. Using it for more than 3 days can make your congestion worse. When should you call for help? Call your doctor now or seek immediate medical care if:    · You have new or worse swelling or redness in your face or around your eyes.     · You have a new or higher fever.    Watch closely for changes in your health, and be sure to contact your doctor if:    · You have new or worse facial pain.     · The mucus from your nose becomes thicker (like pus) or has new blood in it.     · You are not getting better as expected. Where can you learn more? Go to https://[email or web address]well He[email or web address] org and sign in to your ehealthtracker account. Enter S500 in the treadalong box to learn more about \"Sinusitis: Care Instructions. \"     If you do not have an account, please click on the \"Sign Up Now\" link. Current as of: March 27, 2018  Content Version: 11.9  © 3463-7612 [employer]. Care instructions adapted under license by [facility] ([facility]). If you have questions about a medical condition or this instruction, always ask your healthcare professional. [address] any warranty or liability for your use of this information.

## 2019-04-12 ENCOUNTER — NURSE ONLY (OUTPATIENT)
Dept: FAMILY MEDICINE CLINIC | Age: 64
End: 2019-04-12

## 2019-04-12 ENCOUNTER — TELEPHONE (OUTPATIENT)
Dept: FAMILY MEDICINE CLINIC | Age: 64
End: 2019-04-12

## 2019-04-12 VITALS — DIASTOLIC BLOOD PRESSURE: 64 MMHG | SYSTOLIC BLOOD PRESSURE: 136 MMHG

## 2019-04-12 DIAGNOSIS — I10 ESSENTIAL HYPERTENSION: Primary | ICD-10-CM

## 2019-04-17 ENCOUNTER — TELEPHONE (OUTPATIENT)
Dept: FAMILY MEDICINE CLINIC | Age: 64
End: 2019-04-17

## 2019-04-17 NOTE — TELEPHONE ENCOUNTER
Late entry from 4/5    Realized at pts visit on 4/5, after he left,  that the lab never sent me pts PSA result from AUG 2018. All other labs resulted but not PSA. The way those labs drop into my inbox, it was not obvious that I never received his PSA. It was elevated at 5.3. I spoke with pt on 4/5 after the error was found. Discussed with pt and he notes that he follows regularly with Dr. Kayla Valiente (urology) at [facility] for BPH and elevated PSA and has f/u again soon. Apologized for the omission, pt accepting of apology.      Lab Results   Component Value Date    PSA 5.3 08/10/2018    PSA 4.4 08/04/2017    PSA 3.6 06/22/2016

## 2019-04-23 ENCOUNTER — PATIENT MESSAGE (OUTPATIENT)
Dept: FAMILY MEDICINE CLINIC | Age: 64
End: 2019-04-23

## 2019-04-23 DIAGNOSIS — I10 ESSENTIAL HYPERTENSION: Primary | ICD-10-CM

## 2019-04-24 RX ORDER — AMLODIPINE BESYLATE 10 MG/1
10 TABLET ORAL DAILY
Qty: 90 TABLET | Refills: 3 | Status: SHIPPED | OUTPATIENT
Start: 2019-04-24 | End: 2020-04-23

## 2019-04-24 NOTE — TELEPHONE ENCOUNTER
ASSESSMENT & PLAN  1. Essential hypertension    - amLODIPine (NORVASC) 10 MG tablet; Take 1 tablet by mouth daily  Dispense: 90 tablet;  Refill: 3

## 2019-04-24 NOTE — TELEPHONE ENCOUNTER
From: Brandan Tiwari  To: Dav York DO  Sent: 4/23/2019 7:27 PM EDT  Subject: Prescription Question    Hello-    I was taking one amLODIPine 5 MG tablet a day & the doctor doubled the dose several weeks ago. Because I was taking twice as many, I am now out & cannot get a refill. Could you issue a refill (for 10MG per day) so I can continue to take it? The doubled dose seems to be what I needed to get my blood pressure back under control.      Thank you!     - Ally Ragsdale

## 2019-05-19 DIAGNOSIS — K64.0 FIRST DEGREE HEMORRHOIDS: ICD-10-CM

## 2019-05-20 ENCOUNTER — TELEPHONE (OUTPATIENT)
Dept: FAMILY MEDICINE CLINIC | Age: 64
End: 2019-05-20
Payer: MEDICARE

## 2019-05-20 ENCOUNTER — TELEPHONE (OUTPATIENT)
Dept: FAMILY MEDICINE CLINIC | Age: 64
End: 2019-05-20

## 2019-05-20 DIAGNOSIS — Z53.20 COLONOSCOPY REFUSED: Primary | ICD-10-CM

## 2019-05-20 LAB
CONTROL: NORMAL
HEMOCCULT STL QL: NEGATIVE

## 2019-05-20 PROCEDURE — 82274 ASSAY TEST FOR BLOOD FECAL: CPT | Performed by: FAMILY MEDICINE

## 2019-05-20 NOTE — TELEPHONE ENCOUNTER
Patient returned FIT test to office. Test performed and results were negative. Results recorded to pt's chart and HM updated. Encounter routed to  for further review.

## 2019-05-20 NOTE — TELEPHONE ENCOUNTER
----- Message from Eugene Corrales DO sent at 5/20/2019  4:26 PM EDT -----  Please let pt know that FIT test is neg. Repeat 1 year. Let me know if questions, thanks!

## 2019-08-02 ENCOUNTER — NURSE ONLY (OUTPATIENT)
Dept: LAB | Age: 64
End: 2019-08-02

## 2019-08-02 LAB — PROSTATE SPECIFIC ANTIGEN: 5.84 NG/ML (ref 0–1)

## 2019-08-03 ENCOUNTER — PATIENT MESSAGE (OUTPATIENT)
Dept: FAMILY MEDICINE CLINIC | Age: 64
End: 2019-08-03

## 2019-08-12 ENCOUNTER — TELEPHONE (OUTPATIENT)
Dept: FAMILY MEDICINE CLINIC | Age: 64
End: 2019-08-12

## 2019-08-12 ENCOUNTER — NURSE ONLY (OUTPATIENT)
Dept: LAB | Age: 64
End: 2019-08-12

## 2019-08-12 LAB
ABO: NORMAL
ALBUMIN SERPL-MCNC: 4.3 G/DL (ref 3.5–5.1)
ALP BLD-CCNC: 68 U/L (ref 38–126)
ALT SERPL-CCNC: 22 U/L (ref 11–66)
ANION GAP SERPL CALCULATED.3IONS-SCNC: 12 MEQ/L (ref 8–16)
AST SERPL-CCNC: 21 U/L (ref 5–40)
AVERAGE GLUCOSE: 123 MG/DL (ref 70–126)
BASOPHILS # BLD: 0.7 %
BASOPHILS ABSOLUTE: 0 THOU/MM3 (ref 0–0.1)
BILIRUB SERPL-MCNC: 0.4 MG/DL (ref 0.3–1.2)
BUN BLDV-MCNC: 11 MG/DL (ref 7–22)
CALCIUM SERPL-MCNC: 9 MG/DL (ref 8.5–10.5)
CHLORIDE BLD-SCNC: 102 MEQ/L (ref 98–111)
CHOLESTEROL, TOTAL: 165 MG/DL (ref 100–199)
CO2: 24 MEQ/L (ref 23–33)
CREAT SERPL-MCNC: 0.6 MG/DL (ref 0.4–1.2)
CREATININE, URINE: 117.6 MG/DL
EOSINOPHIL # BLD: 1.4 %
EOSINOPHILS ABSOLUTE: 0.1 THOU/MM3 (ref 0–0.4)
ERYTHROCYTE [DISTWIDTH] IN BLOOD BY AUTOMATED COUNT: 12.8 % (ref 11.5–14.5)
ERYTHROCYTE [DISTWIDTH] IN BLOOD BY AUTOMATED COUNT: 42.4 FL (ref 35–45)
GFR SERPL CREATININE-BSD FRML MDRD: > 90 ML/MIN/1.73M2
GLUCOSE BLD-MCNC: 111 MG/DL (ref 70–108)
HBA1C MFR BLD: 6.1 % (ref 4.4–6.4)
HCT VFR BLD CALC: 43.7 % (ref 42–52)
HDLC SERPL-MCNC: 46 MG/DL
HEMOGLOBIN: 15.4 GM/DL (ref 14–18)
IMMATURE GRANS (ABS): 0.02 THOU/MM3 (ref 0–0.07)
IMMATURE GRANULOCYTES: 0 %
LDL CHOLESTEROL CALCULATED: 99 MG/DL
LYMPHOCYTES # BLD: 24.8 %
LYMPHOCYTES ABSOLUTE: 1.4 THOU/MM3 (ref 1–4.8)
MCH RBC QN AUTO: 32.4 PG (ref 26–33)
MCHC RBC AUTO-ENTMCNC: 35.2 GM/DL (ref 32.2–35.5)
MCV RBC AUTO: 91.8 FL (ref 80–94)
MICROALBUMIN UR-MCNC: < 1.2 MG/DL
MICROALBUMIN/CREAT UR-RTO: 10 MG/G (ref 0–30)
MONOCYTES # BLD: 10.2 %
MONOCYTES ABSOLUTE: 0.6 THOU/MM3 (ref 0.4–1.3)
NUCLEATED RED BLOOD CELLS: 0 /100 WBC
PLATELET # BLD: 282 THOU/MM3 (ref 130–400)
PMV BLD AUTO: 10.5 FL (ref 9.4–12.4)
POTASSIUM SERPL-SCNC: 4 MEQ/L (ref 3.5–5.2)
RBC # BLD: 4.76 MILL/MM3 (ref 4.7–6.1)
RH FACTOR: NORMAL
SEG NEUTROPHILS: 62.6 %
SEGMENTED NEUTROPHILS ABSOLUTE COUNT: 3.6 THOU/MM3 (ref 1.8–7.7)
SODIUM BLD-SCNC: 138 MEQ/L (ref 135–145)
TOTAL PROTEIN: 7.4 G/DL (ref 6.1–8)
TRIGL SERPL-MCNC: 99 MG/DL (ref 0–199)
TSH SERPL DL<=0.05 MIU/L-ACNC: 2.67 UIU/ML (ref 0.4–4.2)
VITAMIN D 25-HYDROXY: 36 NG/ML (ref 30–100)
WBC # BLD: 5.8 THOU/MM3 (ref 4.8–10.8)

## 2019-08-13 ENCOUNTER — TELEPHONE (OUTPATIENT)
Dept: FAMILY MEDICINE CLINIC | Age: 64
End: 2019-08-13

## 2019-08-13 LAB
C-REACTIVE PROTEIN, HIGH SENSITIVITY: 1.7 MG/L
THYROXINE (T4): 8.3 UG/DL (ref 4.5–12)

## 2019-08-15 NOTE — PROGRESS NOTES
Chief Complaint   Patient presents with    Follow-up     chronic issues       History obtained from the patient. SUBJECTIVE:  Brent Sotomayor is a 70 y.o. male that presents today for       -HTN:    HPI:    Taking meds as prescribed ?: yes  Tolerating well ?: yes  Side Effects ?: denies  BP at home ?: <140/90  Working on TLCS ?: yes  Chest Pain/SOB/Palpitations? denies    BP Readings from Last 3 Encounters:   08/16/19 120/62   04/12/19 136/64   04/05/19 138/70         -PreDM/Obesity: working on wt loss. Diet a bit better, doesn't exercise as much as he should    Wt Readings from Last 3 Encounters:   08/16/19 227 lb 12.8 oz (103.3 kg)   04/05/19 228 lb (103.4 kg)   08/03/18 230 lb (104.3 kg)         -Vit D def: hx of vit d def, repeat labs WNL.  Doing well        Age/Gender Health Maintenance    Lipid -   Lab Results   Component Value Date    CHOL 165 08/12/2019    CHOL 166 08/10/2018    CHOL 170 12/01/2017     Lab Results   Component Value Date    TRIG 99 08/12/2019    TRIG 104 08/10/2018    TRIG 87 12/01/2017     Lab Results   Component Value Date    HDL 46 08/12/2019    HDL 36 08/10/2018    HDL 41 12/01/2017     Lab Results   Component Value Date    LDLCALC 99 08/12/2019    LDLCALC 109 08/10/2018    LDLCALC 112 12/01/2017     Lab Results   Component Value Date    VLDL 21 08/10/2018    VLDL 17 12/01/2017    VLDL 13 06/22/2016     No results found for: CHOLHDLRATIO      DM Screen -   Lab Results   Component Value Date    GLUCOSE 111 08/12/2019       Lab Results   Component Value Date    LABA1C 6.1 08/12/2019    LABA1C 6.0 08/10/2018    LABA1C 5.9 12/01/2017       113 (OCT 2015)/110 (JUNE 2016)/100 (DEC 2017)    With a1c 6.1 (SEPT 2017)    TSH - 4.090 (DEC 2017)    Lab Results   Component Value Date    TSH 2.670 08/12/2019       Colon Cancer Screening - NEG FIT MAY 2019  Lung Cancer Screening (Age 54 to [de-identified] with 30 pack year hx, current smoker or quit within past 15 years) - never smoker    Tetanus - UTD July 2015  Influenza Vaccine - Candidate FALL 2019  Pneumonia Vaccine - UTD PCV 13 in AUG 2019  Zoster - to get at pharmacy per medicare rules     PSA testing discussion - [facility]rology at [facility]    Lab Results   Component Value Date    PSA 5.84 (H) 08/02/2019    PSA 5.3 08/10/2018    PSA 4.4 08/04/2017     AAA Screening - never smoker      Current Outpatient Medications   Medication Sig Dispense Refill    Mirabegron ER (MYRBETRIQ) 50 MG TB24 Take 50 mg by mouth      hydrocortisone (PROCTOZONE-HC) 2.5 % rectal cream APPLY ONE CREAM RECTALLY TWICE DAILY FOR  THE  NEXT  FEW  DAYS 3 Tube 3    amLODIPine (NORVASC) 10 MG tablet Take 1 tablet by mouth daily 90 tablet 3    losartan (COZAAR) 100 MG tablet TAKE 1 TABLET DAILY 90 tablet 3    Multiple Vitamins-Minerals (MULTIVITAMIN PO) Take by mouth daily      nystatin (MYCOSTATIN) 346109 UNIT/GM cream Apply topically as needed Apply topically 2 times daily.  VIAGRA 100 MG TABS       fluticasone (FLONASE) 50 MCG/ACT nasal spray 1 spray by Nasal route daily for 14 days 1 Bottle 0     No current facility-administered medications for this visit. No orders of the defined types were placed in this encounter. All medications reviewed and reconciled, including OTC and herbal medications. Updated list given to patient.        Patient Active Problem List   Diagnosis    Dermatitis    Hypertension    Erectile dysfunction    Benign prostatic hyperplasia    Prediabetes    Vitamin D deficiency    Right hand pain    Carpal tunnel syndrome of right wrist    Elevated PSA         Past Medical History:   Diagnosis Date    BPH (benign prostatic hypertrophy) 7/17/2015    Dermatitis     follows with Dr. Susan trevino Erectile dysfunction     Hypertension     Prediabetes 7/29/2016         Past Surgical History:   Procedure Laterality Date    PROSTATE SURGERY  2009             No Known Allergies      Social History     Tobacco Use    Smoking status:

## 2019-08-16 ENCOUNTER — OFFICE VISIT (OUTPATIENT)
Dept: FAMILY MEDICINE CLINIC | Age: 64
End: 2019-08-16
Payer: MEDICARE

## 2019-08-16 VITALS
RESPIRATION RATE: 16 BRPM | TEMPERATURE: 98.7 F | SYSTOLIC BLOOD PRESSURE: 120 MMHG | WEIGHT: 227.8 LBS | HEART RATE: 75 BPM | HEIGHT: 69 IN | BODY MASS INDEX: 33.74 KG/M2 | DIASTOLIC BLOOD PRESSURE: 62 MMHG

## 2019-08-16 DIAGNOSIS — E55.9 VITAMIN D DEFICIENCY: ICD-10-CM

## 2019-08-16 DIAGNOSIS — E66.9 OBESITY (BMI 30-39.9): ICD-10-CM

## 2019-08-16 DIAGNOSIS — R73.03 PREDIABETES: ICD-10-CM

## 2019-08-16 DIAGNOSIS — I10 ESSENTIAL HYPERTENSION: Primary | ICD-10-CM

## 2019-08-16 DIAGNOSIS — Z23 NEED FOR PNEUMOCOCCAL VACCINATION: ICD-10-CM

## 2019-08-16 PROCEDURE — 99214 OFFICE O/P EST MOD 30 MIN: CPT | Performed by: FAMILY MEDICINE

## 2019-08-16 PROCEDURE — 1123F ACP DISCUSS/DSCN MKR DOCD: CPT | Performed by: FAMILY MEDICINE

## 2019-08-16 PROCEDURE — 3017F COLORECTAL CA SCREEN DOC REV: CPT | Performed by: FAMILY MEDICINE

## 2019-08-16 PROCEDURE — 90670 PCV13 VACCINE IM: CPT | Performed by: FAMILY MEDICINE

## 2019-08-16 PROCEDURE — 4040F PNEUMOC VAC/ADMIN/RCVD: CPT | Performed by: FAMILY MEDICINE

## 2019-08-16 PROCEDURE — G8427 DOCREV CUR MEDS BY ELIG CLIN: HCPCS | Performed by: FAMILY MEDICINE

## 2019-08-16 PROCEDURE — 1036F TOBACCO NON-USER: CPT | Performed by: FAMILY MEDICINE

## 2019-08-16 PROCEDURE — G8417 CALC BMI ABV UP PARAM F/U: HCPCS | Performed by: FAMILY MEDICINE

## 2019-08-16 PROCEDURE — G0009 ADMIN PNEUMOCOCCAL VACCINE: HCPCS | Performed by: FAMILY MEDICINE

## 2019-08-23 DIAGNOSIS — I10 ESSENTIAL HYPERTENSION: Chronic | ICD-10-CM

## 2019-08-23 RX ORDER — LOSARTAN POTASSIUM 100 MG/1
TABLET ORAL
Qty: 90 TABLET | Refills: 3 | Status: SHIPPED | OUTPATIENT
Start: 2019-08-23 | End: 2019-09-02 | Stop reason: SDUPTHER

## 2019-09-02 DIAGNOSIS — I10 ESSENTIAL HYPERTENSION: Chronic | ICD-10-CM

## 2019-09-03 RX ORDER — LOSARTAN POTASSIUM 100 MG/1
100 TABLET ORAL DAILY
Qty: 90 TABLET | Refills: 3 | Status: SHIPPED | OUTPATIENT
Start: 2019-09-03 | End: 2020-08-17

## 2019-09-11 ENCOUNTER — PATIENT MESSAGE (OUTPATIENT)
Dept: FAMILY MEDICINE CLINIC | Age: 64
End: 2019-09-11

## 2019-09-12 ENCOUNTER — OFFICE VISIT (OUTPATIENT)
Dept: FAMILY MEDICINE CLINIC | Age: 64
End: 2019-09-12
Payer: MEDICARE

## 2019-09-12 VITALS
WEIGHT: 226.4 LBS | HEART RATE: 76 BPM | SYSTOLIC BLOOD PRESSURE: 134 MMHG | DIASTOLIC BLOOD PRESSURE: 70 MMHG | BODY MASS INDEX: 32.41 KG/M2 | HEIGHT: 70 IN | TEMPERATURE: 98.5 F | RESPIRATION RATE: 14 BRPM

## 2019-09-12 DIAGNOSIS — M25.561 ACUTE PAIN OF RIGHT KNEE: Primary | ICD-10-CM
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
## 2019-09-12 PROCEDURE — 1036F TOBACCO NON-USER: CPT | Performed by: NURSE PRACTITIONER

## 2019-09-12 PROCEDURE — 4040F PNEUMOC VAC/ADMIN/RCVD: CPT | Performed by: NURSE PRACTITIONER

## 2019-09-12 PROCEDURE — 99213 OFFICE O/P EST LOW 20 MIN: CPT | Performed by: NURSE PRACTITIONER

## 2019-09-12 PROCEDURE — 3017F COLORECTAL CA SCREEN DOC REV: CPT | Performed by: NURSE PRACTITIONER

## 2019-09-12 PROCEDURE — G8427 DOCREV CUR MEDS BY ELIG CLIN: HCPCS | Performed by: NURSE PRACTITIONER

## 2019-09-12 PROCEDURE — G8417 CALC BMI ABV UP PARAM F/U: HCPCS | Performed by: NURSE PRACTITIONER

## 2019-09-12 PROCEDURE — 1123F ACP DISCUSS/DSCN MKR DOCD: CPT | Performed by: NURSE PRACTITIONER

## 2019-09-12 RX ORDER — LIDOCAINE 50 MG/G
OINTMENT TOPICAL
Qty: 50 G | Refills: 1 | Status: SHIPPED | OUTPATIENT
Start: 2019-09-12

## 2019-09-12 RX ORDER — NAPROXEN 500 MG/1
500 TABLET ORAL 2 TIMES DAILY WITH MEALS
Qty: 180 TABLET | Refills: 1 | Status: SHIPPED | OUTPATIENT
Start: 2019-09-12 | End: 2021-08-23

## 2019-09-12 NOTE — PROGRESS NOTES
SUBJECTIVE:  Elizabeth Jean-Baptiste is a 75 y.o. y/o male that presents with Knee Pain (right knee pain flared up last night- was doing a lot of walking and steps on vacation- elevates, ice and heat and tylenol- still hurts when sitting any kind of bending feels like its cracking)  . HPI:  Symptoms have been present for 4 day(s). The pain is constant, moderate. The patient describes the pain as aching and throbbing. Inciting injury or history of trauma? Yes - he was on vacation and did do a lot of walking and went up and down a lot of stairs. He states he has had pain with these knee before but this activity made it worse. He odes still work and does alot of work on his knees. Pain is relieved by - rest  Pain is aggravated by - walking and stairs  Radiation of the pain? No  Paresthesias of the extremities? No  Decreased ROM? He states it does hurt when he bends it all the way back  Edema? No  Difficulty bearing weight? Yes  Worse with stairs? Yes  Treatments tried - tylenol and heat and ice, with no relief.        Review of Systems  Review of Systems - General ROS: negative for - chills, fever, weight gain or weight loss  Respiratory ROS: no cough, shortness of breath, or wheezing  Cardiovascular ROS: no chest pain or dyspnea on exertion   Musculoskeletal ROS: negative for - swelling in right knee      OBJECTIVE:  /70   Pulse 76   Temp 98.5 °F (36.9 °C) (Oral)   Resp 14   Ht 5' 9.5\" (1.765 m)   Wt 226 lb 6.4 oz (102.7 kg)   BMI 32.95 kg/m²   Physical Examination: General appearance - alert, well appearing, and in no distress  Chest - clear to auscultation, no wheezes, rales or rhonchi, symmetric air entry  Heart - normal rate, regular rhythm, normal S1, S2, no murmurs, rubs, clicks or gallops  Extremities - peripheral pulses normal, no pedal edema, no clubbing or cyanosis  5/5 strength in the LEs  Right Quad strength 5/5  Patellar instability - very good  Joint Line and medial tenderness with palpation - positive  Valgus/Varus testing is negative  Lachman's test is negative  Graham test is negative   No erythema no edema    right knee pain with squatting      ASSESSMENT & PLAN  Alvaro Torres was seen today for knee pain. Diagnoses and all orders for this visit:    Acute pain of right knee  -     lidocaine (XYLOCAINE) 5 % ointment; Apply topically as needed. We did discuss possible causes of the pain, osteoarthritis, meniscal or cartilage damage. He is going to try conservative treatmetn with naprosyn, ace wrap and heat and ice. If no improvement he will return to office to discuss next steps. Overuse injury  Monitor  Rest knee  Pt is going to return to work in 4 days. Other orders  -     naproxen (NAPROSYN) 500 MG tablet; Take 1 tablet by mouth 2 times daily (with meals)        Return if symptoms worsen or fail to improve.

## 2019-09-12 NOTE — TELEPHONE ENCOUNTER
From: Sherren Grinder  To: Kevin MuhammadmanDO  Sent: 9/11/2019 9:34 PM EDT  Subject: Non-Urgent Medical Question    I have pain in my right knee. It hurts when I bend it and go up steps. It doesnt seem to bother me when I walk. Shaheed had some stiffness in my knees off and on for a few years. This may be from overuse so Im using ice & heat with elevation & acetaminophen. Should I make an appointment to see you, should I have it x-rayed, should I continue what Im doing & wait to see how it goes?

## 2019-10-03 ENCOUNTER — NURSE ONLY (OUTPATIENT)
Dept: FAMILY MEDICINE CLINIC | Age: 64
End: 2019-10-03
Payer: MEDICARE

## 2019-10-03 DIAGNOSIS — Z23 NEEDS FLU SHOT: Primary | ICD-10-CM

## 2019-10-03 PROCEDURE — 90653 IIV ADJUVANT VACCINE IM: CPT | Performed by: FAMILY MEDICINE

## 2019-10-03 PROCEDURE — G0008 ADMIN INFLUENZA VIRUS VAC: HCPCS | Performed by: FAMILY MEDICINE

## 2019-10-04 ENCOUNTER — OFFICE VISIT (OUTPATIENT)
Dept: FAMILY MEDICINE CLINIC | Age: 64
End: 2019-10-04
Payer: MEDICARE

## 2019-10-04 VITALS
DIASTOLIC BLOOD PRESSURE: 60 MMHG | RESPIRATION RATE: 10 BRPM | HEART RATE: 68 BPM | BODY MASS INDEX: 32.93 KG/M2 | WEIGHT: 230 LBS | TEMPERATURE: 98.6 F | HEIGHT: 70 IN | SYSTOLIC BLOOD PRESSURE: 110 MMHG

## 2019-10-04 DIAGNOSIS — M54.50 ACUTE RIGHT-SIDED LOW BACK PAIN WITHOUT SCIATICA: Primary | ICD-10-CM

## 2019-10-04 PROCEDURE — 4040F PNEUMOC VAC/ADMIN/RCVD: CPT | Performed by: FAMILY MEDICINE

## 2019-10-04 PROCEDURE — G8417 CALC BMI ABV UP PARAM F/U: HCPCS | Performed by: FAMILY MEDICINE

## 2019-10-04 PROCEDURE — G8427 DOCREV CUR MEDS BY ELIG CLIN: HCPCS | Performed by: FAMILY MEDICINE

## 2019-10-04 PROCEDURE — 99213 OFFICE O/P EST LOW 20 MIN: CPT | Performed by: FAMILY MEDICINE

## 2019-10-04 PROCEDURE — 1036F TOBACCO NON-USER: CPT | Performed by: FAMILY MEDICINE

## 2019-10-04 PROCEDURE — G8482 FLU IMMUNIZE ORDER/ADMIN: HCPCS | Performed by: FAMILY MEDICINE

## 2019-10-04 PROCEDURE — 1123F ACP DISCUSS/DSCN MKR DOCD: CPT | Performed by: FAMILY MEDICINE

## 2019-10-04 PROCEDURE — 3017F COLORECTAL CA SCREEN DOC REV: CPT | Performed by: FAMILY MEDICINE

## 2019-10-14 ENCOUNTER — OFFICE VISIT (OUTPATIENT)
Dept: FAMILY MEDICINE CLINIC | Age: 64
End: 2019-10-14
Payer: MEDICARE

## 2019-10-14 VITALS
RESPIRATION RATE: 16 BRPM | HEART RATE: 88 BPM | WEIGHT: 229 LBS | BODY MASS INDEX: 33.92 KG/M2 | HEIGHT: 69 IN | TEMPERATURE: 98 F | SYSTOLIC BLOOD PRESSURE: 134 MMHG | DIASTOLIC BLOOD PRESSURE: 77 MMHG

## 2019-10-14 DIAGNOSIS — J01.90 ACUTE RHINOSINUSITIS: Primary | ICD-10-CM

## 2019-10-14 PROCEDURE — G8427 DOCREV CUR MEDS BY ELIG CLIN: HCPCS | Performed by: FAMILY MEDICINE

## 2019-10-14 PROCEDURE — G8417 CALC BMI ABV UP PARAM F/U: HCPCS | Performed by: FAMILY MEDICINE

## 2019-10-14 PROCEDURE — 4040F PNEUMOC VAC/ADMIN/RCVD: CPT | Performed by: FAMILY MEDICINE

## 2019-10-14 PROCEDURE — 1123F ACP DISCUSS/DSCN MKR DOCD: CPT | Performed by: FAMILY MEDICINE

## 2019-10-14 PROCEDURE — G8482 FLU IMMUNIZE ORDER/ADMIN: HCPCS | Performed by: FAMILY MEDICINE

## 2019-10-14 PROCEDURE — 3017F COLORECTAL CA SCREEN DOC REV: CPT | Performed by: FAMILY MEDICINE

## 2019-10-14 PROCEDURE — 99213 OFFICE O/P EST LOW 20 MIN: CPT | Performed by: FAMILY MEDICINE

## 2019-10-14 PROCEDURE — 1036F TOBACCO NON-USER: CPT | Performed by: FAMILY MEDICINE

## 2019-10-14 RX ORDER — BENZONATATE 100 MG/1
CAPSULE ORAL
Qty: 60 CAPSULE | Refills: 0 | Status: SHIPPED | OUTPATIENT
Start: 2019-10-14 | End: 2019-10-24

## 2019-10-14 RX ORDER — DOXYCYCLINE HYCLATE 100 MG/1
100 CAPSULE ORAL 2 TIMES DAILY
Qty: 20 CAPSULE | Refills: 0 | Status: SHIPPED | OUTPATIENT
Start: 2019-10-14 | End: 2019-10-24

## 2019-11-08 ENCOUNTER — TELEPHONE (OUTPATIENT)
Dept: FAMILY MEDICINE CLINIC | Age: 64
End: 2019-11-08

## 2019-11-08 DIAGNOSIS — J01.90 ACUTE RHINOSINUSITIS: Primary | ICD-10-CM

## 2019-11-08 RX ORDER — AMOXICILLIN AND CLAVULANATE POTASSIUM 875; 125 MG/1; MG/1
1 TABLET, FILM COATED ORAL 2 TIMES DAILY
Qty: 20 TABLET | Refills: 0 | Status: SHIPPED | OUTPATIENT
Start: 2019-11-08 | End: 2020-04-24 | Stop reason: SDUPTHER

## 2019-11-25 ENCOUNTER — OFFICE VISIT (OUTPATIENT)
Dept: FAMILY MEDICINE CLINIC | Age: 64
End: 2019-11-25
Payer: MEDICARE

## 2019-11-25 VITALS
WEIGHT: 233 LBS | DIASTOLIC BLOOD PRESSURE: 80 MMHG | HEART RATE: 80 BPM | HEIGHT: 69 IN | RESPIRATION RATE: 16 BRPM | SYSTOLIC BLOOD PRESSURE: 128 MMHG | BODY MASS INDEX: 34.51 KG/M2 | TEMPERATURE: 97.9 F

## 2019-11-25 DIAGNOSIS — R05.8 POST-VIRAL COUGH SYNDROME: Primary | ICD-10-CM

## 2019-11-25 PROCEDURE — 3017F COLORECTAL CA SCREEN DOC REV: CPT | Performed by: FAMILY MEDICINE

## 2019-11-25 PROCEDURE — 4040F PNEUMOC VAC/ADMIN/RCVD: CPT | Performed by: FAMILY MEDICINE

## 2019-11-25 PROCEDURE — G8427 DOCREV CUR MEDS BY ELIG CLIN: HCPCS | Performed by: FAMILY MEDICINE

## 2019-11-25 PROCEDURE — G8482 FLU IMMUNIZE ORDER/ADMIN: HCPCS | Performed by: FAMILY MEDICINE

## 2019-11-25 PROCEDURE — 1036F TOBACCO NON-USER: CPT | Performed by: FAMILY MEDICINE

## 2019-11-25 PROCEDURE — 1123F ACP DISCUSS/DSCN MKR DOCD: CPT | Performed by: FAMILY MEDICINE

## 2019-11-25 PROCEDURE — 99213 OFFICE O/P EST LOW 20 MIN: CPT | Performed by: FAMILY MEDICINE

## 2019-11-25 PROCEDURE — G8417 CALC BMI ABV UP PARAM F/U: HCPCS | Performed by: FAMILY MEDICINE

## 2019-11-25 RX ORDER — FLUTICASONE PROPIONATE 110 UG/1
2 AEROSOL, METERED RESPIRATORY (INHALATION) 2 TIMES DAILY
Qty: 1 INHALER | Refills: 0 | Status: SHIPPED | OUTPATIENT
Start: 2019-11-25 | End: 2019-12-25

## 2019-11-25 RX ORDER — OXYBUTYNIN CHLORIDE 10 MG/1
TABLET, EXTENDED RELEASE ORAL
Refills: 2 | COMMUNITY
Start: 2019-10-17 | End: 2021-08-23

## 2019-11-25 RX ORDER — FLUTICASONE PROPIONATE 50 MCG
1 SPRAY, SUSPENSION (ML) NASAL DAILY
Qty: 1 BOTTLE | Refills: 0 | Status: SHIPPED | OUTPATIENT
Start: 2019-11-25 | End: 2020-04-20

## 2019-11-30 ENCOUNTER — HOSPITAL ENCOUNTER (OUTPATIENT)
Age: 64
Discharge: HOME | End: 2019-11-30
Payer: MEDICARE

## 2019-11-30 ENCOUNTER — HOSPITAL ENCOUNTER (OUTPATIENT)
Dept: GENERAL RADIOLOGY | Age: 64
Discharge: HOME | End: 2019-11-30
Payer: MEDICARE

## 2019-11-30 DIAGNOSIS — R05.8 POST-VIRAL COUGH SYNDROME: ICD-10-CM

## 2019-11-30 PROCEDURE — 71046 X-RAY EXAM CHEST 2 VIEWS: CPT

## 2019-12-02 ENCOUNTER — TELEPHONE (OUTPATIENT)
Dept: FAMILY MEDICINE CLINIC | Age: 64
End: 2019-12-02

## 2020-02-17 ENCOUNTER — TELEPHONE (OUTPATIENT)
Dept: FAMILY MEDICINE CLINIC | Age: 65
End: 2020-02-17

## 2020-02-21 ENCOUNTER — NURSE ONLY (OUTPATIENT)
Dept: LAB | Age: 65
End: 2020-02-21

## 2020-02-21 LAB
AVERAGE GLUCOSE: 132 MG/DL (ref 70–126)
GLUCOSE BLD-MCNC: 128 MG/DL (ref 70–108)
HBA1C MFR BLD: 6.4 % (ref 4.4–6.4)

## 2020-02-24 ENCOUNTER — TELEPHONE (OUTPATIENT)
Dept: FAMILY MEDICINE CLINIC | Age: 65
End: 2020-02-24

## 2020-02-24 NOTE — TELEPHONE ENCOUNTER
----- Message from Lin Jacobs DO sent at 2/23/2020 10:03 AM EST -----  Please let pt know that blood sugar a bit higher than 6 months ago. Still in prediabetic range. con't to work on diet changes/wt loss. Would like to repeat this lab 3 months instead of 6  Fasting. Labs ordered. Let me know if questions, thanks!

## 2020-03-20 ENCOUNTER — OFFICE VISIT (OUTPATIENT)
Dept: FAMILY MEDICINE CLINIC | Age: 65
End: 2020-03-20
Payer: MEDICARE

## 2020-03-20 ENCOUNTER — TELEPHONE (OUTPATIENT)
Dept: FAMILY MEDICINE CLINIC | Age: 65
End: 2020-03-20

## 2020-03-20 VITALS
RESPIRATION RATE: 14 BRPM | HEART RATE: 88 BPM | DIASTOLIC BLOOD PRESSURE: 58 MMHG | BODY MASS INDEX: 34.07 KG/M2 | HEIGHT: 69 IN | WEIGHT: 230 LBS | TEMPERATURE: 98.3 F | SYSTOLIC BLOOD PRESSURE: 136 MMHG

## 2020-03-20 PROCEDURE — G8482 FLU IMMUNIZE ORDER/ADMIN: HCPCS | Performed by: FAMILY MEDICINE

## 2020-03-20 PROCEDURE — G8417 CALC BMI ABV UP PARAM F/U: HCPCS | Performed by: FAMILY MEDICINE

## 2020-03-20 PROCEDURE — 1123F ACP DISCUSS/DSCN MKR DOCD: CPT | Performed by: FAMILY MEDICINE

## 2020-03-20 PROCEDURE — G8427 DOCREV CUR MEDS BY ELIG CLIN: HCPCS | Performed by: FAMILY MEDICINE

## 2020-03-20 PROCEDURE — 4040F PNEUMOC VAC/ADMIN/RCVD: CPT | Performed by: FAMILY MEDICINE

## 2020-03-20 PROCEDURE — 99214 OFFICE O/P EST MOD 30 MIN: CPT | Performed by: FAMILY MEDICINE

## 2020-03-20 PROCEDURE — 3017F COLORECTAL CA SCREEN DOC REV: CPT | Performed by: FAMILY MEDICINE

## 2020-03-20 PROCEDURE — 1036F TOBACCO NON-USER: CPT | Performed by: FAMILY MEDICINE

## 2020-03-20 ASSESSMENT — PATIENT HEALTH QUESTIONNAIRE - PHQ9
SUM OF ALL RESPONSES TO PHQ QUESTIONS 1-9: 0
2. FEELING DOWN, DEPRESSED OR HOPELESS: 0
SUM OF ALL RESPONSES TO PHQ9 QUESTIONS 1 & 2: 0
1. LITTLE INTEREST OR PLEASURE IN DOING THINGS: 0
SUM OF ALL RESPONSES TO PHQ QUESTIONS 1-9: 0

## 2020-03-20 NOTE — TELEPHONE ENCOUNTER
If otherwise well, could see him this morning  Otherwise, would have him con't flonase and also see if he can flush his ear

## 2020-03-20 NOTE — TELEPHONE ENCOUNTER
Pt denies cough, fever, runny nose, any sob. Just says his right ear feels like there may be more earwax in there.       Future Appointments   Date Time Provider Ju Urbina   3/20/2020  9:20 AM [facility] DO [facility] [place]   8/21/2020  8:20 AM Leo Peters, [address] [place] [address]

## 2020-03-20 NOTE — PROGRESS NOTES
pack year hx, current smoker or quit within past 15 years) - never smoker    Tetanus - UTD July 2015  Influenza Vaccine - UTD FALL 2019  Pneumonia Vaccine - UTD PCV 13 in AUG 2019  Zoster - UTD shinrix x 2    PSA testing discussion - Follows with urology at [facility]    Lab Results   Component Value Date    PSA 5.84 (H) 08/02/2019    PSA 5.3 08/10/2018    PSA 4.4 08/04/2017     AAA Screening - never smoker       Current Outpatient Medications   Medication Sig Dispense Refill    oxybutynin (DITROPAN-XL) 10 MG extended release tablet TAKE 1 TABLET BY MOUTH ONCE DAILY  2    fluticasone (FLONASE) 50 MCG/ACT nasal spray 1 spray by Nasal route daily for 14 days 1 Bottle 0    naproxen (NAPROSYN) 500 MG tablet Take 1 tablet by mouth 2 times daily (with meals) 180 tablet 1    lidocaine (XYLOCAINE) 5 % ointment Apply topically as needed. 50 g 1    losartan (COZAAR) 100 MG tablet Take 1 tablet by mouth daily 90 tablet 3    Mirabegron ER (MYRBETRIQ) 50 MG TB24 Take 50 mg by mouth      hydrocortisone (PROCTOZONE-HC) 2.5 % rectal cream APPLY ONE CREAM RECTALLY TWICE DAILY FOR  THE  NEXT  FEW  DAYS 3 Tube 3    amLODIPine (NORVASC) 10 MG tablet Take 1 tablet by mouth daily 90 tablet 3    Multiple Vitamins-Minerals (MULTIVITAMIN PO) Take by mouth daily      nystatin (MYCOSTATIN) 396654 UNIT/GM cream Apply topically as needed Apply topically 2 times daily.  VIAGRA 100 MG TABS        No current facility-administered medications for this visit. No orders of the defined types were placed in this encounter. All medications reviewed and reconciled, including OTC and herbal medications. Updated list given to patient.        Patient Active Problem List   Diagnosis    Dermatitis    Hypertension    Erectile dysfunction    Benign prostatic hyperplasia    Prediabetes    Vitamin D deficiency    Right hand pain    Carpal tunnel syndrome of right wrist    Elevated PSA         Past Medical History:   Diagnosis Date    BPH (benign prostatic hypertrophy) 7/17/2015    Dermatitis     follows with Dr. Gamaliel trevino Erectile dysfunction     Hypertension     Prediabetes 7/29/2016         Past Surgical History:   Procedure Laterality Date    PROSTATE SURGERY  2009             No Known Allergies      Social History     Tobacco Use    Smoking status: Never Smoker    Smokeless tobacco: Never Used   Substance Use Topics    Alcohol use: No       Family History   Problem Relation Age of Onset    High Blood Pressure Mother     Cancer Father         Lung Cancer    Colon Cancer Neg Hx     Prostate Cancer Neg Hx          I have reviewed the patient's past medical history, past surgical history, allergies, medications, social and family history and I have made updates where appropriate. Review of Systems  Positive responses are highlighted in bold    Constitutional:  Fever, Chills, Night Sweats, Fatigue, Unexpected changes in weight  HENT:  Ear pain, Tinnitus, Nosebleeds, Trouble swallowing, Hearing loss, Sore throat  Cardiovascular:  Chest Pain, Palpitations, Orthopnea, Paroxysmal Nocturnal Dyspnea  Respiratory:  Cough, Wheezing, Shortness of breath, Chest tightness, Apnea  Gastrointestinal:  Nausea, Vomiting, Diarrhea, Constipation, Heartburn, Blood in stool  Genitourinary:  Difficulty or painful urination, Flank pain, Change in frequency, Urgency  Skin:  Color change, Rash, Itching, Wound  Musculoskeletal:  Joint pain, Back pain, Gait problems, Joint swelling, Myalgias  Neurological:  Dizziness, Headaches, Presyncope, Numbness, Seizures, Tremors  Endocrine:  Heat Intolerance, Cold Intolerance, Polydipsia, Polyphagia, Polyuria      PHYSICAL EXAM:  Vitals:    03/20/20 0924   BP: (!) 136/58   Pulse: 88   Resp: 14   Temp: 98.3 °F (36.8 °C)   Weight: 230 lb (104.3 kg)   Height: 5' 9\" (1.753 m)     Body mass index is 33.97 kg/m².   Pain Score:   0 - No pain    VS Reviewed  General Appearance: A&O x 3, No acute distress,well developed and well- nourished  Eyes: pupils equal, round, and reactive to light, extraocular eye movements intact, conjunctivae and eye lids without erythema  ENT: Right TM normal landmarks and mobility, left TM normal landmarks and mobility, right canal ceruminous: irrigated and left canal ceruminous: irrigated. No mastoid erythema or tenderness bilaterally. No external ear tenderness. Nose without deformity, nasal mucosa and turbinates pink, oropharynx normal, dentition is normal for age. Uvula midline. Neck: supple and non-tender without mass, no thyromegaly or thyroid nodules, no cervical lymphadenopathy  Pulmonary/Chest: clear to auscultation bilaterally- no wheezes, rales or rhonchi, normal air movement, no respiratory distress or retractions  Cardiovascular: S1 and S2 auscultated w/ RRR. No murmurs, rubs, clicks, or gallops, distal pulses intact. Abdomen: soft, non-tender, non-distended, bowel sounds physiologic,  no rebound or guarding, no masses or hernias noted. Liver and spleen without enlargement. Extremities: no cyanosis, clubbing or edema of the lower extremities. Skin: warm and dry, no rash or erythema      ASSESSMENT & PLAN  1. Ceruminosis, bilateral    Improved after irrigation  Add debrox  F/u prn    2. Prediabetes    Discussed in detail  Labs in 3 months  Work on life style changes    3. Obesity (BMI 30-39. 9)    Discussed wt loss strategies. DISPOSITION    Return if symptoms worsen or fail to improve. Alyson Kava released without restrictions. Future Appointments   Date Time Provider Ju Urbina   8/21/2020  8:20 AM Sukhi Sims 125    Patient given educational materials on: See Attached    Barriers to learning and self management: none    Discussed use, benefit, and side effects of prescribed medications. Barriers to medication compliance addressed. All patient questions answered. Pt voiced understanding.

## 2020-04-20 ENCOUNTER — VIRTUAL VISIT (OUTPATIENT)
Dept: FAMILY MEDICINE CLINIC | Age: 65
End: 2020-04-20
Payer: MEDICARE

## 2020-04-20 VITALS
WEIGHT: 224 LBS | TEMPERATURE: 98 F | SYSTOLIC BLOOD PRESSURE: 135 MMHG | DIASTOLIC BLOOD PRESSURE: 85 MMHG | BODY MASS INDEX: 33.08 KG/M2 | RESPIRATION RATE: 18 BRPM | HEART RATE: 84 BPM

## 2020-04-20 PROCEDURE — 4040F PNEUMOC VAC/ADMIN/RCVD: CPT | Performed by: FAMILY MEDICINE

## 2020-04-20 PROCEDURE — G0438 PPPS, INITIAL VISIT: HCPCS | Performed by: FAMILY MEDICINE

## 2020-04-20 PROCEDURE — 1123F ACP DISCUSS/DSCN MKR DOCD: CPT | Performed by: FAMILY MEDICINE

## 2020-04-20 PROCEDURE — 3017F COLORECTAL CA SCREEN DOC REV: CPT | Performed by: FAMILY MEDICINE

## 2020-04-20 ASSESSMENT — PATIENT HEALTH QUESTIONNAIRE - PHQ9
SUM OF ALL RESPONSES TO PHQ QUESTIONS 1-9: 0
SUM OF ALL RESPONSES TO PHQ QUESTIONS 1-9: 0

## 2020-04-20 ASSESSMENT — LIFESTYLE VARIABLES: HOW OFTEN DO YOU HAVE A DRINK CONTAINING ALCOHOL: 0

## 2020-04-20 NOTE — PROGRESS NOTES
Medicare Annual Wellness Visit  Name: Ilda Nagy Date: 2020   MRN: [id number] Sex: Male   Age: 75 y.o. Ethnicity: Non-/Non    : 1955 Race: Amelia Rodriguez is here for Medicare AWV    Screenings for behavioral, psychosocial and functional/safety risks, and cognitive dysfunction are all negative except as indicated below. These results, as well as other patient data from the [address] form, are documented in Flowsheets linked to this Encounter. No Known Allergies      Prior to Visit Medications    Medication Sig Taking? Authorizing Provider   oxybutynin (DITROPAN-XL) 10 MG extended release tablet TAKE 1 TABLET BY MOUTH ONCE DAILY  Meghna Holloway   naproxen (NAPROSYN) 500 MG tablet Take 1 tablet by mouth 2 times daily (with meals)  HAROLDO Harkins CNP   lidocaine (XYLOCAINE) 5 % ointment Apply topically as needed. HAROLDO Harkins CNP   losartan (COZAAR) 100 MG tablet Take 1 tablet by mouth daily  Gisell Back, DO   Mirabegron ER (MYRBETRIQ) 50 MG TB24 Take 50 mg by mouth  Historical Provider, MD   hydrocortisone (PROCTOZONE-HC) 2.5 % rectal cream APPLY ONE CREAM RECTALLY TWICE DAILY FOR  THE  NEXT  FEW  DAYS  [facility] DO   amLODIPine (NORVASC) 10 MG tablet Take 1 tablet by mouth daily  Gisell Back, DO   Multiple Vitamins-Minerals (MULTIVITAMIN PO) Take by mouth daily  Historical Provider, MD   nystatin (MYCOSTATIN) 981988 UNIT/GM cream Apply topically as needed Apply topically 2 times daily.    Historical Provider, MD   VIAGRA 100 MG TABS   Historical Provider, MD         Past Medical History:   Diagnosis Date    BPH (benign prostatic hypertrophy) 2015    Dermatitis     follows with Dr. Sheron trevino Erectile dysfunction     Hypertension     Prediabetes 2016       Past Surgical History:   Procedure Laterality Date    PROSTATE SURGERY               Family History   Problem Relation Age of Onset    High Blood Pressure Mother     Cancer Father         Lung Cancer    Colon Cancer Neg Hx     Prostate Cancer Neg Hx        CareTeam (Including outside providers/suppliers regularly involved in providing care):   Patient Care Team:  Karissa Zhang DO as PCP - General (Family Medicine)  Karissa Zhang DO as PCP - [facility] Empaneled Provider    Wt Readings from Last 3 Encounters:   04/20/20 224 lb (101.6 kg)   03/20/20 230 lb (104.3 kg)   11/25/19 233 lb (105.7 kg)     Vitals:    04/20/20 0852   BP: 135/85  Comment: Pt provided   Pulse: 84  Comment: Pt provided   Resp: 18   Temp: 98 °F (36.7 °C)  Comment: Pt provided   Weight: 224 lb (101.6 kg)  Comment: Pt provided     Body mass index is 33.08 kg/m². Based upon direct observation of the patient, evaluation of cognition reveals recent and remote memory intact. General Appearance: alert and oriented to person, place and time, well-developed and well-nourished, in no acute distress  Skin: warm and dry, no rash or erythema  Head: normocephalic and atraumatic  Eyes: pupils equal, round, and reactive to light, extraocular eye movements intact, conjunctivae normal  ENT: hearing grossly normal bilaterally    Patient's complete Health Risk Assessment and screening values have been reviewed and are found in Flowsheets. The following problems were reviewed today and where indicated follow up appointments were made and/or referrals ordered. Positive Risk Factor Screenings with Interventions:     Health Habits/Nutrition:  Health Habits/Nutrition  Do you exercise for at least 20 minutes 2-3 times per week?: Yes  Have you lost any weight without trying in the past 3 months?: No  Do you eat fewer than 2 meals per day?: No  Have you seen a dentist within the past year?: Yes  Body mass index is 33.08 kg/m².   Health Habits/Nutrition Interventions:  · Inadequate physical activity:  discussed wt loss/exercise for 10 min today  · Nutritional issues:  discussed

## 2020-04-23 RX ORDER — AMLODIPINE BESYLATE 10 MG/1
TABLET ORAL
Qty: 90 TABLET | Refills: 3 | Status: SHIPPED | OUTPATIENT
Start: 2020-04-23 | End: 2021-04-16

## 2020-04-24 ENCOUNTER — TELEPHONE (OUTPATIENT)
Dept: ADMINISTRATIVE | Age: 65
End: 2020-04-24

## 2020-04-24 RX ORDER — AMOXICILLIN AND CLAVULANATE POTASSIUM 875; 125 MG/1; MG/1
1 TABLET, FILM COATED ORAL 2 TIMES DAILY
Qty: 20 TABLET | Refills: 0 | Status: SHIPPED | OUTPATIENT
Start: 2020-04-24 | End: 2020-05-04

## 2020-04-24 NOTE — TELEPHONE ENCOUNTER
Patient called stating that the right side of his throat, gland feels swollen and hurts when he touches it. Bothers him when he sleeps. No fever, no sore throat, no other issues or symptoms. Stated the pain is not excruciating, more just uncomfortable. Said he can do a VV if needed. Requested an antibiotic or advice?     DOLV 04/20/2020  DONV 08/21/2020  [facility] on [address]  [phone]

## 2020-04-24 NOTE — TELEPHONE ENCOUNTER
Patient notified and voiced understanding. If no better patient will make a fup appointment. No further questions at this time.

## 2020-05-21 ENCOUNTER — TELEPHONE (OUTPATIENT)
Dept: FAMILY MEDICINE CLINIC | Age: 65
End: 2020-05-21

## 2020-05-21 ENCOUNTER — NURSE ONLY (OUTPATIENT)
Dept: LAB | Age: 65
End: 2020-05-21

## 2020-05-21 LAB
AVERAGE GLUCOSE: 120 MG/DL (ref 70–126)
GLUCOSE BLD-MCNC: 114 MG/DL (ref 70–108)
HBA1C MFR BLD: 6 % (ref 4.4–6.4)

## 2020-05-22 ENCOUNTER — TELEPHONE (OUTPATIENT)
Dept: FAMILY MEDICINE CLINIC | Age: 65
End: 2020-05-22

## 2020-05-22 NOTE — TELEPHONE ENCOUNTER
Pt due for cologuard   Please let pt know we'll fax off the order  He should get the kit in the mail in the next few wks  This is to screen for colon cancer  We discussed this in the office    Let me know if questions, thanks! Diagnosis Orders   1. Screening for colon cancer     2.  Colonoscopy refused         Future Appointments   Date Time Provider Ju Urbina   8/21/2020  8:20 AM Felipa Gavin, [address] [place] [address]

## 2020-06-03 ENCOUNTER — TELEPHONE (OUTPATIENT)
Dept: FAMILY MEDICINE CLINIC | Age: 65
End: 2020-06-03

## 2020-06-03 ENCOUNTER — PATIENT MESSAGE (OUTPATIENT)
Dept: FAMILY MEDICINE CLINIC | Age: 65
End: 2020-06-03

## 2020-06-03 RX ORDER — MECLIZINE HYDROCHLORIDE 25 MG/1
25 TABLET ORAL 3 TIMES DAILY PRN
Qty: 30 TABLET | Refills: 0 | Status: SHIPPED | OUTPATIENT
Start: 2020-06-03 | End: 2020-06-13

## 2020-06-03 NOTE — TELEPHONE ENCOUNTER
From: [facility]  To: Blane Rodarte DO  Sent: 6/3/2020 4:03 PM EDT  Subject: Visit Follow-Up Question    This is a follow-up to the message sent almost an hour ago. Stella rojas can sit up. He doesnt want to move his head back & forth because he gets dizzy but if he stays still, especially with his eyes closed, hes okay.

## 2020-06-03 NOTE — PROGRESS NOTES
supple and non-tender without mass, no thyromegaly or thyroid nodules, no cervical lymphadenopathy  Pulmonary/Chest: clear to auscultation bilaterally- no wheezes, rales or rhonchi, normal air movement, no respiratory distress or retractions  Cardiovascular: S1 and S2 auscultated w/ RRR. No murmurs, rubs, clicks, or gallops, distal pulses intact. Abdomen: soft, non-tender, non-distended, bowel sounds physiologic,  no rebound or guarding, no masses or hernias noted. Liver and spleen without enlargement. Extremities: no cyanosis, clubbing or edema of the lower extremities. Skin: warm and dry, no rash or erythema  Neuro Exam:   Cranial Ofs[place]-NI-RWR Intact.    Cranial nerve II: Normal Visual Acuity   Cranial nerve III: Pupils: equal, round, reactive to light   Cranial nerves III, IV, VI: Extraocular Movements: intact   Cranial nerve V: Facial sensation: intact   Cranial nerve VII:Facial strength: intact   Cranial nerve VIII: Hearing: intact   Cranial nerve IX: Palate Elevation intact bilaterally  Cranial nerve XI: Shoulder shrug intact bilaterally  Cranial nerve XII: Tongue movement: normal     Muscle Strength Testing    Deltoid Right 5/5  Left 5/5  Biceps Right 5/5  Left 5/5  Triceps Right 5/5  Left 5/5  Wrist Extensors Right  5/5  Left 5/5   Flexor Digitorum Profundus Right 5/5  Left 5/5  Hand Intrinsics Right 5/5  Left 5/5  Hip Flexors Right 5/5  Left 5/5  Quadriceps Right 5/5  Left 5/5  Anterior Tibialis Right 5/5  Left 5/5  Extensor Hallucis Longus Right 5/5  Left 5/5  Gastrocnemius/Soleus Right 5/5  Left 5/5     Sensory Testing    C5 Right 0=Normal; no sensory loss Left 0=Normal; no sensory loss  C6 Right 0=Normal; no sensory loss Left 0=Normal; no sensory loss  C7 Right 0=Normal; no sensory loss Left 0=Normal; no sensory loss  C8 Right 0=Normal; no sensory loss Left 0=Normal; no sensory loss  T1 Right 0=Normal; no sensory loss Left 0=Normal; no sensory loss    L2 Right 0=Normal; no sensory loss Left

## 2020-06-03 NOTE — TELEPHONE ENCOUNTER
Karissa Lim states pt is still very dizzy getting up, laying down he is fine.    Pt refused ER and has tried the epley maneuver not much relief and lambert maneuver a little relief    She is requesting appt for tomorrow, not sure if he can get here still really dizzy and refusing ER

## 2020-06-03 NOTE — TELEPHONE ENCOUNTER
Pt's wife states last night pt started getting dizzy (like room spinning) when he gets out of bed he's nauseated   She states pt has had issues with inner ear crystals before. She thought maybe he needed rx for motion sickness   Schedule is full please advise.

## 2020-06-04 ENCOUNTER — OFFICE VISIT (OUTPATIENT)
Dept: FAMILY MEDICINE CLINIC | Age: 65
End: 2020-06-04
Payer: MEDICARE

## 2020-06-04 VITALS
SYSTOLIC BLOOD PRESSURE: 136 MMHG | HEART RATE: 68 BPM | OXYGEN SATURATION: 98 % | TEMPERATURE: 98.1 F | HEIGHT: 68 IN | DIASTOLIC BLOOD PRESSURE: 64 MMHG | BODY MASS INDEX: 33.65 KG/M2 | RESPIRATION RATE: 12 BRPM | WEIGHT: 222 LBS

## 2020-06-04 PROCEDURE — G8427 DOCREV CUR MEDS BY ELIG CLIN: HCPCS | Performed by: FAMILY MEDICINE

## 2020-06-04 PROCEDURE — 4040F PNEUMOC VAC/ADMIN/RCVD: CPT | Performed by: FAMILY MEDICINE

## 2020-06-04 PROCEDURE — 1123F ACP DISCUSS/DSCN MKR DOCD: CPT | Performed by: FAMILY MEDICINE

## 2020-06-04 PROCEDURE — 99213 OFFICE O/P EST LOW 20 MIN: CPT | Performed by: FAMILY MEDICINE

## 2020-06-04 PROCEDURE — G8417 CALC BMI ABV UP PARAM F/U: HCPCS | Performed by: FAMILY MEDICINE

## 2020-06-04 PROCEDURE — 3017F COLORECTAL CA SCREEN DOC REV: CPT | Performed by: FAMILY MEDICINE

## 2020-06-04 PROCEDURE — 1036F TOBACCO NON-USER: CPT | Performed by: FAMILY MEDICINE

## 2020-06-15 ENCOUNTER — TELEPHONE (OUTPATIENT)
Dept: FAMILY MEDICINE CLINIC | Age: 65
End: 2020-06-15

## 2020-06-15 RX ORDER — PREDNISONE 20 MG/1
40 TABLET ORAL DAILY
Qty: 10 TABLET | Refills: 0 | Status: SHIPPED | OUTPATIENT
Start: 2020-06-15 | End: 2020-06-20

## 2020-06-15 RX ORDER — FLUTICASONE PROPIONATE 50 MCG
1 SPRAY, SUSPENSION (ML) NASAL DAILY
Qty: 1 BOTTLE | Refills: 0 | Status: SHIPPED | OUTPATIENT
Start: 2020-06-15 | End: 2020-08-21

## 2020-06-15 RX ORDER — DOXYCYCLINE HYCLATE 100 MG/1
100 CAPSULE ORAL 2 TIMES DAILY
Qty: 20 CAPSULE | Refills: 0 | Status: SHIPPED | OUTPATIENT
Start: 2020-06-15 | End: 2020-06-25

## 2020-06-15 NOTE — TELEPHONE ENCOUNTER
Pt states his right ear has been plugged for about 4 days, he has a little nasal congestion, some times he has slight dizziness not frequent and its random,  if he turns his head a certain way.  (it is much better that it was)    Pt requesting rx for his ear  Pharmacy [facility] [place]

## 2020-06-18 ENCOUNTER — TELEPHONE (OUTPATIENT)
Dept: FAMILY MEDICINE CLINIC | Age: 65
End: 2020-06-18

## 2020-07-17 ENCOUNTER — TELEPHONE (OUTPATIENT)
Dept: FAMILY MEDICINE CLINIC | Age: 65
End: 2020-07-17

## 2020-07-17 NOTE — TELEPHONE ENCOUNTER
Pt due for fasting labs prior to next apt on 8/21/2020. Please call to have pt complete this. Thanks! ASSESSMENT & PLAN   Diagnosis Orders   1.  Essential hypertension  CBC Auto Differential    Comprehensive Metabolic Panel    Lipid Panel    Microalbumin / Creatinine Urine Ratio    TSH with Reflex     Future Appointments   Date Time Provider Ju Urbina   8/21/2020  8:20 AM Clive Jordan, [address] [place] [address]

## 2020-08-17 ENCOUNTER — NURSE ONLY (OUTPATIENT)
Dept: LAB | Age: 65
End: 2020-08-17

## 2020-08-17 ENCOUNTER — TELEPHONE (OUTPATIENT)
Dept: FAMILY MEDICINE CLINIC | Age: 65
End: 2020-08-17

## 2020-08-17 LAB
ALBUMIN SERPL-MCNC: 4.1 G/DL (ref 3.5–5.1)
ALP BLD-CCNC: 70 U/L (ref 38–126)
ALT SERPL-CCNC: 19 U/L (ref 11–66)
ANION GAP SERPL CALCULATED.3IONS-SCNC: 9 MEQ/L (ref 8–16)
AST SERPL-CCNC: 18 U/L (ref 5–40)
BASOPHILS # BLD: 0.6 %
BASOPHILS ABSOLUTE: 0 THOU/MM3 (ref 0–0.1)
BILIRUB SERPL-MCNC: 0.4 MG/DL (ref 0.3–1.2)
BUN BLDV-MCNC: 13 MG/DL (ref 7–22)
CALCIUM SERPL-MCNC: 8.6 MG/DL (ref 8.5–10.5)
CHLORIDE BLD-SCNC: 102 MEQ/L (ref 98–111)
CHOLESTEROL, TOTAL: 174 MG/DL (ref 100–199)
CO2: 26 MEQ/L (ref 23–33)
CREAT SERPL-MCNC: 0.6 MG/DL (ref 0.4–1.2)
CREATININE, URINE: 177.4 MG/DL
EOSINOPHIL # BLD: 1.9 %
EOSINOPHILS ABSOLUTE: 0.1 THOU/MM3 (ref 0–0.4)
ERYTHROCYTE [DISTWIDTH] IN BLOOD BY AUTOMATED COUNT: 13.2 % (ref 11.5–14.5)
ERYTHROCYTE [DISTWIDTH] IN BLOOD BY AUTOMATED COUNT: 44.8 FL (ref 35–45)
GFR SERPL CREATININE-BSD FRML MDRD: > 90 ML/MIN/1.73M2
GLUCOSE BLD-MCNC: 120 MG/DL (ref 70–108)
HCT VFR BLD CALC: 46 % (ref 42–52)
HDLC SERPL-MCNC: 36 MG/DL
HEMOGLOBIN: 15.5 GM/DL (ref 14–18)
IMMATURE GRANS (ABS): 0.01 THOU/MM3 (ref 0–0.07)
IMMATURE GRANULOCYTES: 0.2 %
LDL CHOLESTEROL CALCULATED: 113 MG/DL
LYMPHOCYTES # BLD: 26.7 %
LYMPHOCYTES ABSOLUTE: 1.4 THOU/MM3 (ref 1–4.8)
MCH RBC QN AUTO: 32 PG (ref 26–33)
MCHC RBC AUTO-ENTMCNC: 33.7 GM/DL (ref 32.2–35.5)
MCV RBC AUTO: 95 FL (ref 80–94)
MICROALBUMIN UR-MCNC: 2.26 MG/DL
MICROALBUMIN/CREAT UR-RTO: 13 MG/G (ref 0–30)
MONOCYTES # BLD: 10.9 %
MONOCYTES ABSOLUTE: 0.6 THOU/MM3 (ref 0.4–1.3)
NUCLEATED RED BLOOD CELLS: 0 /100 WBC
PLATELET # BLD: 271 THOU/MM3 (ref 130–400)
PMV BLD AUTO: 10.8 FL (ref 9.4–12.4)
POTASSIUM SERPL-SCNC: 3.9 MEQ/L (ref 3.5–5.2)
RBC # BLD: 4.84 MILL/MM3 (ref 4.7–6.1)
SEG NEUTROPHILS: 59.7 %
SEGMENTED NEUTROPHILS ABSOLUTE COUNT: 3.1 THOU/MM3 (ref 1.8–7.7)
SODIUM BLD-SCNC: 137 MEQ/L (ref 135–145)
TOTAL PROTEIN: 7.3 G/DL (ref 6.1–8)
TRIGL SERPL-MCNC: 124 MG/DL (ref 0–199)
TSH SERPL DL<=0.05 MIU/L-ACNC: 2.59 UIU/ML (ref 0.4–4.2)
WBC # BLD: 5.2 THOU/MM3 (ref 4.8–10.8)

## 2020-08-17 RX ORDER — LOSARTAN POTASSIUM 100 MG/1
TABLET ORAL
Qty: 90 TABLET | Refills: 3 | Status: SHIPPED | OUTPATIENT
Start: 2020-08-17 | End: 2021-08-02

## 2020-08-17 NOTE — TELEPHONE ENCOUNTER
Recent Visits  Date Type Provider Dept   06/04/20 Office Visit Irven Kawasaki, DO [facility]   03/20/20 Office Visit Irven Kawasaki, [place] [facility]   11/25/19 Office Visit Irven Kawasaki, DO [facility]   10/14/19 Office Visit Byronmarcos Kawasaki, DO [facility]   10/04/19 Office Visit Irven Kawasaki, DO [facility]   09/12/19 Office Visit HAROLDO Watts CNP [facility]   08/16/19 Office Visit Byronmarcos Kawasaki, DO [facility]   04/05/19 Office Visit Byronmarcos Kawasaki, DO [facility]   Showing recent visits within past 540 days with a meds authorizing provider and meeting all other requirements     Future Appointments  Date Type Provider Dept   08/21/20 Appointment Irven Kawasaki,  [facility]   Showing future appointments within next 150 days with a meds authorizing provider and meeting all other requirements         Future Appointments   Date Time Provider Ju Urbina   8/21/2020  8:20 AM Irven Kawasaki, [address] [place] [address]

## 2020-08-17 NOTE — TELEPHONE ENCOUNTER
----- Message from Stan Homans, APRN - CNP sent at 8/17/2020  2:41 PM EDT -----  Let pt know his urine is normal, his thyroid levels are in normal range. Renal and liver function in normal range, his fasting sugar is slightly elevated at 120 , I recommend decreasing sugar and carbohydrate intake. We will continue to monitor this. His sugars have been  Running in this range. His cholesterol is in normal range. Continue all current meds, let me know if he has any questions.

## 2020-08-21 ENCOUNTER — OFFICE VISIT (OUTPATIENT)
Dept: FAMILY MEDICINE CLINIC | Age: 65
End: 2020-08-21
Payer: MEDICARE

## 2020-08-21 VITALS
RESPIRATION RATE: 14 BRPM | BODY MASS INDEX: 34.07 KG/M2 | OXYGEN SATURATION: 97 % | TEMPERATURE: 97.3 F | DIASTOLIC BLOOD PRESSURE: 63 MMHG | SYSTOLIC BLOOD PRESSURE: 127 MMHG | HEART RATE: 63 BPM | WEIGHT: 230 LBS | HEIGHT: 69 IN

## 2020-08-21 LAB
BILIRUBIN URINE: NEGATIVE
BLOOD URINE, POC: NEGATIVE
CHARACTER, URINE: CLEAR
COLOR, URINE: YELLOW
GLUCOSE URINE: NEGATIVE MG/DL
KETONES, URINE: NEGATIVE
LEUKOCYTE CLUMPS, URINE: NEGATIVE
NITRITE, URINE: NEGATIVE
PH, URINE: 5.5 (ref 5–9)
PROTEIN, URINE: NEGATIVE MG/DL
SPECIFIC GRAVITY, URINE: 1.02 (ref 1–1.03)
UROBILINOGEN, URINE: 0.2 EU/DL (ref 0–1)

## 2020-08-21 PROCEDURE — G8417 CALC BMI ABV UP PARAM F/U: HCPCS | Performed by: FAMILY MEDICINE

## 2020-08-21 PROCEDURE — 90732 PPSV23 VACC 2 YRS+ SUBQ/IM: CPT | Performed by: FAMILY MEDICINE

## 2020-08-21 PROCEDURE — G0009 ADMIN PNEUMOCOCCAL VACCINE: HCPCS | Performed by: FAMILY MEDICINE

## 2020-08-21 PROCEDURE — 3017F COLORECTAL CA SCREEN DOC REV: CPT | Performed by: FAMILY MEDICINE

## 2020-08-21 PROCEDURE — 1123F ACP DISCUSS/DSCN MKR DOCD: CPT | Performed by: FAMILY MEDICINE

## 2020-08-21 PROCEDURE — 1036F TOBACCO NON-USER: CPT | Performed by: FAMILY MEDICINE

## 2020-08-21 PROCEDURE — G8427 DOCREV CUR MEDS BY ELIG CLIN: HCPCS | Performed by: FAMILY MEDICINE

## 2020-08-21 PROCEDURE — 99214 OFFICE O/P EST MOD 30 MIN: CPT | Performed by: FAMILY MEDICINE

## 2020-08-21 PROCEDURE — 4040F PNEUMOC VAC/ADMIN/RCVD: CPT | Performed by: FAMILY MEDICINE

## 2020-08-21 RX ORDER — TIZANIDINE 4 MG/1
4 TABLET ORAL 3 TIMES DAILY PRN
Qty: 45 TABLET | Refills: 0 | Status: SHIPPED | OUTPATIENT
Start: 2020-08-21 | End: 2021-08-23

## 2020-08-21 NOTE — PATIENT INSTRUCTIONS
LAB INSTRUCTIONS:    Please complete labs in 3 month(s). Please fast for 8 hours prior to lab collection. The clinic will call you within 1 week of collection. If you have not heard from us within that amount of time, please call us at [phone]. Patient Education        High Blood Pressure: Care Instructions  Overview     It's normal for blood pressure to go up and down throughout the day. But if it stays up, you have high blood pressure. Another name for high blood pressure is hypertension. Despite what a lot of people think, high blood pressure usually doesn't cause headaches or make you feel dizzy or lightheaded. It usually has no symptoms. But it does increase your risk of stroke, heart attack, and other problems. You and your doctor will talk about your risks of these problems based on your blood pressure. Your doctor will give you a goal for your blood pressure. Your goal will be based on your health and your age. Lifestyle changes, such as eating healthy and being active, are always important to help lower blood pressure. You might also take medicine to reach your blood pressure goal.  Follow-up care is a key part of your treatment and safety. Be sure to make and go to all appointments, and call your doctor if you are having problems. It's also a good idea to know your test results and keep a list of the medicines you take. How can you care for yourself at home? Medical treatment  · If you stop taking your medicine, your blood pressure will go back up. You may take one or more types of medicine to lower your blood pressure. Be safe with medicines. Take your medicine exactly as prescribed. Call your doctor if you think you are having a problem with your medicine. · Talk to your doctor before you start taking aspirin every day. Aspirin can help certain people lower their risk of a heart attack or stroke. But taking aspirin isn't right for everyone, because it can cause serious bleeding.   · See your doctor regularly. You may need to see the doctor more often at first or until your blood pressure comes down. · If you are taking blood pressure medicine, talk to your doctor before you take decongestants or anti-inflammatory medicine, such as ibuprofen. Some of these medicines can raise blood pressure. · Learn how to check your blood pressure at home. Lifestyle changes  · Stay at a healthy weight. This is especially important if you put on weight around the waist. Losing even 10 pounds can help you lower your blood pressure. · If your doctor recommends it, get more exercise. Walking is a good choice. Bit by bit, increase the amount you walk every day. Try for at least 30 minutes on most days of the week. You also may want to swim, bike, or do other activities. · Avoid or limit alcohol. Talk to your doctor about whether you can drink any alcohol. · Try to limit how much sodium you eat to less than 2,300 milligrams (mg) a day. Your doctor may ask you to try to eat less than 1,500 mg a day. · Eat plenty of fruits (such as bananas and oranges), vegetables, legumes, whole grains, and low-fat dairy products. · Lower the amount of saturated fat in your diet. Saturated fat is found in animal products such as milk, cheese, and meat. Limiting these foods may help you lose weight and also lower your risk for heart disease. · Do not smoke. Smoking increases your risk for heart attack and stroke. If you need help quitting, talk to your doctor about stop-smoking programs and medicines. These can increase your chances of quitting for good. When should you call for help? Call  911 anytime you think you may need emergency care. This may mean having symptoms that suggest that your blood pressure is causing a serious heart or blood vessel problem. Your blood pressure may be over 180/120. For example, call 911 if:  · You have symptoms of a heart attack. These may include:  ?  Chest pain or pressure, or a strange feeling in the chest.  ? Sweating. ? Shortness of breath. ? Nausea or vomiting. ? Pain, pressure, or a strange feeling in the back, neck, jaw, or upper belly or in one or both shoulders or arms. ? Lightheadedness or sudden weakness. ? A fast or irregular heartbeat. · You have symptoms of a stroke. These may include:  ? Sudden numbness, tingling, weakness, or loss of movement in your face, arm, or leg, especially on only one side of your body. ? Sudden vision changes. ? Sudden trouble speaking. ? Sudden confusion or trouble understanding simple statements. ? Sudden problems with walking or balance. ? A sudden, severe headache that is different from past headaches. · You have severe back or belly pain. Do not wait until your blood pressure comes down on its own. Get help right away. Call your doctor now or seek immediate care if:  · Your blood pressure is much higher than normal (such as 180/120 or higher), but you don't have symptoms. · You think high blood pressure is causing symptoms, such as:  ? Severe headache.  ? Blurry vision. Watch closely for changes in your health, and be sure to contact your doctor if:  · Your blood pressure measures higher than your doctor recommends at least 2 times. That means the top number is higher or the bottom number is higher, or both. · You think you may be having side effects from your blood pressure medicine. Where can you learn more? Go to https://[email or web address].[email or web address] org and sign in to your [employer] account. Enter X451 in the Any+Times box to learn more about \"High Blood Pressure: Care Instructions. \"     If you do not have an account, please click on the \"Sign Up Now\" link. Current as of: December 16, 2019               Content Version: 12.5  © 1258-8825 Healthwise, Incorporated. Care instructions adapted under license by [address]  If you have questions about a medical condition or this instruction, always ask your healthcare professional. [address] any warranty or liability for your use of this information.

## 2020-08-25 ENCOUNTER — HOSPITAL ENCOUNTER (OUTPATIENT)
Age: 65
Discharge: HOME | End: 2020-08-25
Payer: MEDICARE

## 2020-08-25 ENCOUNTER — HOSPITAL ENCOUNTER (OUTPATIENT)
Dept: GENERAL RADIOLOGY | Age: 65
Discharge: HOME | End: 2020-08-25
Payer: MEDICARE

## 2020-08-25 PROCEDURE — 72100 X-RAY EXAM L-S SPINE 2/3 VWS: CPT

## 2020-08-26 ENCOUNTER — TELEPHONE (OUTPATIENT)
Dept: FAMILY MEDICINE CLINIC | Age: 65
End: 2020-08-26

## 2020-08-26 NOTE — TELEPHONE ENCOUNTER
----- Message from Tiffany Tucker DO sent at 8/26/2020  7:10 AM EDT -----  Please let pt know that xrays show small compression fractures at T12, L2 and L4. These appear to be old. These likely do not relate to his current back pain. Any prior injury to the back? Let me know. Thanks!

## 2020-08-26 NOTE — TELEPHONE ENCOUNTER
Pt states he fell off of ladder 20 yrs ago, and saw Dr Abdi Ibarra for this. Pt was wondering what thought you have for current back pain?

## 2020-08-26 NOTE — TELEPHONE ENCOUNTER
Got it, I knew he'd told be about the back and I just couldn't remember. Based on the xray findings, those compression fractures are likely from that fall. That being said, the bones also looked a bit thin on the xray, something called osteopenia. I would recommend we get a bone density scan to look at this further to ensure the bones aren't too thin. As for this back pain, we can continue with the exercises I gave him or we can set him up with PT. That is up to him. Let me know if questions, thanks! Diagnosis Orders   1. Osteopenia of lumbar spine  DEXA BONE DENSITY AXIAL SKELETON   2.  Compression fracture of lumbar vertebra, unspecified lumbar vertebral level, sequela  DEXA BONE DENSITY AXIAL SKELETON       Future Appointments   Date Time Provider Ju Urbina   8/23/2021  8:40 AM Candice Mckeon, [address] [facility]

## 2020-08-26 NOTE — TELEPHONE ENCOUNTER
Patient has been informed and voiced understanding and states that he would like to to the PT but would like to wait to see what the DEXA scan shows before getting set up with PT  Patient transferred to scheduling to set up DEXA scan

## 2020-09-16 ENCOUNTER — HOSPITAL ENCOUNTER (OUTPATIENT)
Dept: WOMENS IMAGING | Age: 65
Discharge: HOME | End: 2020-09-16
Payer: MEDICARE

## 2020-09-16 PROCEDURE — 77080 DXA BONE DENSITY AXIAL: CPT

## 2020-09-17 ENCOUNTER — TELEPHONE (OUTPATIENT)
Dept: FAMILY MEDICINE CLINIC | Age: 65
End: 2020-09-17

## 2020-09-17 NOTE — TELEPHONE ENCOUNTER
----- Message from Maggie Phipps, DO sent at 9/17/2020  5:45 AM EDT -----  Please let pt know that bone density scan is normal  No f/u needed for this  Let me know if questions, thanks!

## 2020-09-18 ENCOUNTER — TELEPHONE (OUTPATIENT)
Dept: FAMILY MEDICINE CLINIC | Age: 65
End: 2020-09-18

## 2020-09-18 NOTE — TELEPHONE ENCOUNTER
Pt informed and verbalized understanding.   Pt would like the referral to [facility] physical therapy [address] [facility]

## 2020-09-18 NOTE — TELEPHONE ENCOUNTER
Pt called asking what the next step would be since the DEXA scan has come back normal. Pt states he is still having the back pain.     Please advise

## 2020-09-18 NOTE — TELEPHONE ENCOUNTER
Diagnosis Orders   1. Chronic left-sided low back pain without sciatica  External Referral To Physical Therapy   2.  Compression fracture of lumbar vertebra, unspecified lumbar vertebral level, sequela  External Referral To Physical Therapy

## 2020-10-15 ENCOUNTER — NURSE ONLY (OUTPATIENT)
Dept: FAMILY MEDICINE CLINIC | Age: 65
End: 2020-10-15
Payer: MEDICARE

## 2020-10-15 PROCEDURE — G0008 ADMIN INFLUENZA VIRUS VAC: HCPCS | Performed by: FAMILY MEDICINE

## 2020-10-15 PROCEDURE — 90694 VACC AIIV4 NO PRSRV 0.5ML IM: CPT | Performed by: FAMILY MEDICINE

## 2020-11-18 ENCOUNTER — HOSPITAL ENCOUNTER (OUTPATIENT)
Age: 65
Setting detail: SPECIMEN
Discharge: HOME | End: 2020-11-18
Payer: MEDICARE

## 2020-11-18 ENCOUNTER — TELEPHONE (OUTPATIENT)
Dept: FAMILY MEDICINE CLINIC | Age: 65
End: 2020-11-18

## 2020-11-18 PROCEDURE — U0003 INFECTIOUS AGENT DETECTION BY NUCLEIC ACID (DNA OR RNA); SEVERE ACUTE RESPIRATORY SYNDROME CORONAVIRUS 2 (SARS-COV-2) (CORONAVIRUS DISEASE [COVID-19]), AMPLIFIED PROBE TECHNIQUE, MAKING USE OF HIGH THROUGHPUT TECHNOLOGIES AS DESCRIBED BY CMS-2020-01-R: HCPCS

## 2020-11-18 NOTE — TELEPHONE ENCOUNTER
Would be reasonable to test  If negative, would con't to monitor for sxs and if any develop, let us know right away     Diagnosis Orders   1.  Close exposure to COVID-19 virus  COVID-19 Ambulatory

## 2020-11-18 NOTE — TELEPHONE ENCOUNTER
Pt stopped by his wife's place of employment on Monday. Since then, many of her co workers have tested positive and her herself is getting tested today and is on quarantine. Should he get tested? If he should they would like to go to the drive through at [facility] to get this done. Please advise.

## 2020-11-19 ENCOUNTER — PATIENT MESSAGE (OUTPATIENT)
Dept: FAMILY MEDICINE CLINIC | Age: 65
End: 2020-11-19

## 2020-11-20 NOTE — TELEPHONE ENCOUNTER
appt made .    Future Appointments   Date Time Provider Ju Urbina   11/24/2020  8:20 AM Yuriy Doss DO [facility]   8/23/2021  8:40 AM Frederick Meyer[address] [place] [address]

## 2020-11-20 NOTE — TELEPHONE ENCOUNTER
From: Mami Chicas  To: Wilbur Alejo DO  Sent: 11/19/2020 9:38 PM EST  Subject: Non-Urgent Medical Question    Hi Dr Salazar Springer-    I'm experiencing carpel tunnel symptoms in my left hand. I had the surgery on my right hand & now it's stiff & numb. I wear a brace at night, which helps, but it's been getting worse for the past couple of months. Do I need to see you as a next step? Thank you!      Екатерина Garcia

## 2020-11-23 RX ORDER — TAMSULOSIN HYDROCHLORIDE 0.4 MG/1
CAPSULE ORAL
COMMUNITY
Start: 2020-11-03 | End: 2022-01-07 | Stop reason: ALTCHOICE

## 2020-11-23 NOTE — PROGRESS NOTES
Chief Complaint   Patient presents with    Follow-up     L WRIST PAIN       History obtained from the patient. SUBJECTIVE:  Mingo Camacho is a 77 y.o. male that presents today for       -L wrist pain:  4-6 months  L wrist pain and numbness   Worse with rest   Better with movement  No weakness  No injury  Started when working on a deck.        Age/Gender Health Maintenance    Lipid -   Lab Results   Component Value Date    CHOL 174 08/17/2020    CHOL 165 08/12/2019    CHOL 166 08/10/2018     Lab Results   Component Value Date    TRIG 124 08/17/2020    TRIG 99 08/12/2019    TRIG 104 08/10/2018     Lab Results   Component Value Date    HDL 36 08/17/2020    HDL 46 08/12/2019    HDL 36 08/10/2018     Lab Results   Component Value Date    LDLCALC 113 08/17/2020    [address] 99 08/12/2019    LDLCALC 109 08/10/2018       DM Screen -   Lab Results   Component Value Date    GLUCOSE 120 08/17/2020       Lab Results   Component Value Date    LABA1C 6.0 05/21/2020    LABA1C 6.4 02/21/2020    LABA1C 6.1 08/12/2019    LABA1C 6.0 08/10/2018    LABA1C 5.9 12/01/2017       113 (OCT 2015)/110 (JUNE 2016)/100 (DEC 2017)    With a1c 6.1 (SEPT 2017)    TSH - 4.090 (DEC 2017)    Lab Results   Component Value Date    TSH 2.590 08/17/2020       Colon Cancer Screening - NEG Cologuard June 2020  Lung Cancer Screening (Age 54 to [de-identified] with 30 pack year hx, current smoker or quit within past 15 years) - never smoker    Tetanus - UTD July 2015  Influenza Vaccine - UTD FALL 2020  Pneumonia Vaccine - UTD PCV 13 in AUG 2019/PPV 23 in AUG 2020  Zoster - UTD shinrix x 2    PSA testing discussion - Follows with urology at [facility]    Lab Results   Component Value Date    PSA 6.53 (H) 10/07/2020    PSA 5.84 (H) 08/02/2019    PSA 5.3 08/10/2018     AAA Screening - never smoker       Current Outpatient Medications   Medication Sig Dispense Refill    tamsulosin (FLOMAX) 0.4 MG capsule TAKE 1 CAPSULE BY MOUTH ONCE DAILY AT BEDTIME      tiZANidine (ZANAFLEX) 4 MG tablet Take 1 tablet by mouth 3 times daily as needed (back pain) 45 tablet 0    losartan (COZAAR) 100 MG tablet TAKE 1 TABLET DAILY 90 tablet 3    amLODIPine (NORVASC) 10 MG tablet Take 1 tablet by mouth once daily 90 tablet 3    oxybutynin (DITROPAN-XL) 10 MG extended release tablet TAKE 1 TABLET BY MOUTH ONCE DAILY  2    naproxen (NAPROSYN) 500 MG tablet Take 1 tablet by mouth 2 times daily (with meals) (Patient taking differently: Take 500 mg by mouth 2 times daily (with meals) As needed) 180 tablet 1    lidocaine (XYLOCAINE) 5 % ointment Apply topically as needed. 50 g 1    Mirabegron ER (MYRBETRIQ) 50 MG TB24 Take 50 mg by mouth      hydrocortisone (PROCTOZONE-HC) 2.5 % rectal cream APPLY ONE CREAM RECTALLY TWICE DAILY FOR  THE  NEXT  FEW  DAYS 3 Tube 3    Multiple Vitamins-Minerals (MULTIVITAMIN PO) Take by mouth daily      nystatin (MYCOSTATIN) 505316 UNIT/GM cream Apply topically as needed Apply topically 2 times daily. As needed      VIAGRA 100 MG TABS        No current facility-administered medications for this visit. No orders of the defined types were placed in this encounter. All medications reviewed and reconciled, including OTC and herbal medications. Updated list given to patient. Patient Active Problem List   Diagnosis    Dermatitis    Hypertension    Erectile dysfunction    BPH with elevated PSA    Prediabetes    Vitamin D deficiency    Right hand pain    Carpal tunnel syndrome of right wrist    Obesity (BMI 30-39. 9)       Past Medical History:   Diagnosis Date    BPH with elevated PSA 7/17/2015    Follows with urology for this and elevated PSA    Dermatitis     follows with Dr. Adrienne trevino Erectile dysfunction     Hypertension     Obesity (BMI 30-39. 9)     Prediabetes 7/29/2016         Past Surgical History:   Procedure Laterality Date    PROSTATE SURGERY  2009             No Known Allergies      Social History     Tobacco Use    Smoking status: Never Smoker    Smokeless tobacco: Never Used   Substance Use Topics    Alcohol use: No       Family History   Problem Relation Age of Onset    High Blood Pressure Mother     Cancer Father         Lung Cancer    Colon Cancer Neg Hx     Prostate Cancer Neg Hx          I have reviewed the patient's past medical history, past surgical history, allergies, medications, social and family history and I have made updates where appropriate. Review of Systems  Positive responses are highlighted in bold    Constitutional:  Fever, Chills, Night Sweats, Fatigue, Unexpected changes in weight  HENT:  Ear pain, Tinnitus, Nosebleeds, Trouble swallowing, Hearing loss, Sore throat  Cardiovascular:  Chest Pain, Palpitations, Orthopnea, Paroxysmal Nocturnal Dyspnea  Respiratory:  Cough, Wheezing, Shortness of breath, Chest tightness, Apnea  Gastrointestinal:  Nausea, Vomiting, Diarrhea, Constipation, Heartburn, Blood in stool  Genitourinary:  Difficulty or painful urination, Flank pain, Change in frequency, Urgency  Skin:  Color change, Rash, Itching, Wound  Musculoskeletal:  Joint pain, Back pain, Gait problems, Joint swelling, Myalgias  Neurological:  Dizziness, Headaches, Presyncope, Numbness, Seizures, Tremors  Endocrine:  Heat Intolerance, Cold Intolerance, Polydipsia, Polyphagia, Polyuria      PHYSICAL EXAM:  Vitals:    11/24/20 0828   BP: 136/80   Pulse: 101   Resp: 12   Temp: 99 °F (37.2 °C)   TempSrc: Oral   SpO2: 95%   Weight: 231 lb (104.8 kg)   Height: 5' 8.6\" (1.742 m)     Body mass index is 34.51 kg/m². Pain Score:    2(L wrist)    VS Reviewed  General Appearance: A&O x 3, No acute distress,well developed and well- nourished  Eyes: pupils equal, round, and reactive to light, extraocular eye movements intact, conjunctivae and eye lids without erythema  ENT: external ear and ear canal clear bilaterally, TMs intact and regular, nose without deformity, nasal mucosa and turbinates normal without polyps, oropharynx normal, dentition is normal for age  Neck: supple and non-tender without mass, no thyromegaly or thyroid nodules, no cervical lymphadenopathy  Pulmonary/Chest: clear to auscultation bilaterally- no wheezes, rales or rhonchi, normal air movement, no respiratory distress or retractions  Cardiovascular: S1 and S2 auscultated w/ RRR. No murmurs, rubs, clicks, or gallops, distal pulses intact. Abdomen: soft, non-tender, non-distended, bowel sounds physiologic,  no rebound or guarding, no masses or hernias noted. Liver and spleen without enlargement. Extremities: no cyanosis, clubbing or edema of the lower extremities. Skin: warm and dry, no rash or erythema  Left Wrist and Hand Exam:  There is none  swelling. There is not skeletal deformity. Wrist range of motion is not limited by pain. Digital range of motion is not limited by pain and swelling. Neg TTP at [place]. FDS,FDP and Common Extensor tendon function is intact to each digit. FPL and EPL tendon function is intact to the thumb. Phalen's test positive. Tinnel's test positive. Finkelstein's test negative. Skin color, texture, turgor is normal.  No rashes or lesions found of the injured extremity. Vascular exam shows normal  And good capillary refill bilaterally. Sensation is subjectively normal in the whole hand. Digits are normally sensate. ASSESSMENT & PLAN  1. Carpal tunnel syndrome, left    HEP  con't splinting  Refer to [facility], Mariaelena Aragon MD, Orthopedic Surgery, [address]    Return if symptoms worsen or fail to improve. Lois Valiente released without restrictions. Future Appointments   Date Time Provider Ju Ciara   8/23/2021  8:40 AM Dylan Hoang, DO [facility] [facility]    Patient given educational materials on: See Attached    Barriers to learning and self management: none    Discussed use, benefit, and side effects of prescribed medications. Barriers to medication compliance addressed. All patient questions answered. Pt voiced understanding.        Electronically signed by Claude Pilot, DO on 11/24/2020 at 8:52 AM

## 2020-11-24 ENCOUNTER — OFFICE VISIT (OUTPATIENT)
Dept: FAMILY MEDICINE CLINIC | Age: 65
End: 2020-11-24
Payer: MEDICARE

## 2020-11-24 VITALS
HEART RATE: 101 BPM | HEIGHT: 69 IN | TEMPERATURE: 99 F | SYSTOLIC BLOOD PRESSURE: 136 MMHG | WEIGHT: 231 LBS | DIASTOLIC BLOOD PRESSURE: 80 MMHG | BODY MASS INDEX: 34.21 KG/M2 | OXYGEN SATURATION: 95 % | RESPIRATION RATE: 12 BRPM

## 2020-11-24 PROCEDURE — 4040F PNEUMOC VAC/ADMIN/RCVD: CPT | Performed by: FAMILY MEDICINE

## 2020-11-24 PROCEDURE — 1036F TOBACCO NON-USER: CPT | Performed by: FAMILY MEDICINE

## 2020-11-24 PROCEDURE — G8417 CALC BMI ABV UP PARAM F/U: HCPCS | Performed by: FAMILY MEDICINE

## 2020-11-24 PROCEDURE — 3017F COLORECTAL CA SCREEN DOC REV: CPT | Performed by: FAMILY MEDICINE

## 2020-11-24 PROCEDURE — G8427 DOCREV CUR MEDS BY ELIG CLIN: HCPCS | Performed by: FAMILY MEDICINE

## 2020-11-24 PROCEDURE — 1123F ACP DISCUSS/DSCN MKR DOCD: CPT | Performed by: FAMILY MEDICINE

## 2020-11-24 PROCEDURE — G8484 FLU IMMUNIZE NO ADMIN: HCPCS | Performed by: FAMILY MEDICINE

## 2020-11-24 PROCEDURE — 99213 OFFICE O/P EST LOW 20 MIN: CPT | Performed by: FAMILY MEDICINE

## 2020-11-24 NOTE — PATIENT INSTRUCTIONS
You may receive a survey regarding the care you received during your visit. Your input is valuable to us. We encourage you to complete and return your survey. We hope you will choose us in the future for your healthcare needs. Patient Education        Carpal Tunnel Syndrome: Care Instructions  Overview     Carpal tunnel syndrome is numbness, tingling, weakness, and pain in your hand, wrist, and sometimes forearm. It is caused by pressure on the median nerve. This nerve and several tough tissues called tendons run through a space in the wrist. This space is called the carpal tunnel. The repeated hand motions used in work and some hobbies and sports can put pressure on the median nerve. Pregnancy can cause carpal tunnel syndrome. Several conditions, such as diabetes, arthritis, and an underactive thyroid, can also cause it. You may be able to limit an activity or change the way you do it to reduce your symptoms. You also can take other steps to feel better. If your symptoms are mild, 1 to 2 weeks of home treatment are likely to ease your pain. Surgery is needed only if other treatments do not work. Follow-up care is a key part of your treatment and safety. Be sure to make and go to all appointments, and call your doctor if you are having problems. It's also a good idea to know your test results and keep a list of the medicines you take. How can you care for yourself at home? · If possible, stop or reduce the activity that causes your symptoms. If you cannot stop the activity, take frequent breaks to rest and stretch or change hand positions to do a task. Try switching hands, such as when using a computer mouse. · Try to avoid bending or twisting your wrists. · Ask your doctor if you can take an over-the-counter pain medicine, such as acetaminophen (Tylenol), ibuprofen (Advil, Motrin), or naproxen (Aleve). Be safe with medicines. Read and follow all instructions on the label.   · If your doctor prescribes corticosteroid medicine to help reduce pain and swelling, take it exactly as prescribed. Call your doctor if you think you are having a problem with your medicine. · Put ice or a cold pack on your wrist for 10 to 20 minutes at a time to ease pain. Put a thin cloth between the ice and your skin. · If your doctor or your physical or occupational therapist tells you to wear a wrist splint, wear it as directed to keep your wrist in a neutral position. This also eases pressure on your median nerve. · Ask your doctor whether you should have physical or occupational therapy to learn how to do tasks differently. · Try a yoga class to stretch your muscles and build strength in your hands and wrists. Yoga has been shown to ease carpal tunnel symptoms. To prevent carpal tunnel  · When working at a computer, keep your hands and wrists in line with your forearms. Hold your elbows close to your sides. Take a break every 10 to 15 minutes. · Try these exercises:  ? Warm up: Rotate your wrist up, down, and from side to side. Repeat this 4 times. Stretch your fingers far apart, relax them, then stretch them again. Repeat 4 times. Stretch your thumb by pulling it back gently, holding it, and then releasing it. Repeat 4 times. ? Prayer stretch: Start with your palms together in front of your chest just below your chin. Slowly lower your hands toward your waistline while keeping your hands close to your stomach and your palms together until you feel a mild to moderate stretch under your forearms. Hold for 10 to 20 seconds. Repeat 4 times. ? Wrist flexor stretch: Hold your arm in front of you with your palm up. Bend your wrist, pointing your hand toward the floor. With your other hand, gently bend your wrist further until you feel a mild to moderate stretch in your forearm. Hold for 10 to 20 seconds. Repeat 4 times.   ? Wrist extensor stretch: Repeat the steps for the wrist flexor stretch, but begin with your extended hand palm down.  · Squeeze a rubber exercise ball several times a day to keep your hands and fingers strong. · Avoid holding objects (such as a book) in one position for a long time. When possible, use your whole hand to grasp an object. Using just the thumb and index finger can put stress on the wrist.  · Do not smoke. It can make this condition worse by reducing blood flow to the median nerve. If you need help quitting, talk to your doctor about stop-smoking programs and medicines. These can increase your chances of quitting for good. When should you call for help? Watch closely for changes in your health, and be sure to contact your doctor if:    · Your pain or other problems do not get better with home care.     · You want more information about physical or occupational therapy.     · You have side effects of your corticosteroid medicine, such as:  ? Weight gain. ? Mood changes. ? Trouble sleeping. ? Bruising easily.     · You have any other problems with your medicine. Where can you learn more? Go to https://Aprovecha.com.Mediastream. org and sign in to your [employer] account. Enter R432 in the [employer] box to learn more about \"Carpal Tunnel Syndrome: Care Instructions. \"     If you do not have an account, please click on the \"Sign Up Now\" link. Current as of: March 2, 2020               Content Version: 12.6  © 5531-6262 [employer]. Care instructions adapted under license by [facility] ([facility]). If you have questions about a medical condition or this instruction, always ask your healthcare professional. [place] [address] any warranty or liability for your use of this information. Patient Education        Carpal Tunnel Syndrome: Exercises  Introduction  Here are some examples of exercises for you to try. The exercises may be suggested for a condition or for rehabilitation. Start each exercise slowly. Ease off the exercises if you start to have pain.   You will be told when to start these exercises and which ones will work best for you. Warm-up stretches  When you no longer have pain or numbness, you can do exercises to help prevent carpal tunnel syndrome from coming back. Do not do any stretch or movement that is uncomfortable or painful. 1. Rotate your wrist up, down, and from side to side. Repeat 4 times. 2. Stretch your fingers far apart. Relax them, and then stretch them again. Repeat 4 times. 3. Stretch your thumb by pulling it back gently, holding it, and then releasing it. Repeat 4 times. How to do the exercises  Prayer stretch   1. Start with your palms together in front of your chest just below your chin. 2. Slowly lower your hands toward your waistline, keeping your hands close to your stomach and your palms together until you feel a mild to moderate stretch under your forearms. 3. Hold for at least 15 to 30 seconds. Repeat 2 to 4 times. Wrist flexor stretch   1. Extend your arm in front of you with your palm up. 2. Bend your wrist, pointing your hand toward the floor. 3. With your other hand, gently bend your wrist farther until you feel a mild to moderate stretch in your forearm. 4. Hold for at least 15 to 30 seconds. Repeat 2 to 4 times. Wrist extensor stretch   1. Repeat steps 1 through 4 of the stretch above, but begin with your extended hand palm down. Follow-up care is a key part of your treatment and safety. Be sure to make and go to all appointments, and call your doctor if you are having problems. It's also a good idea to know your test results and keep a list of the medicines you take. Where can you learn more? Go to https://Carbaybony.JournallyMe. org and sign in to your [employer] account. Enter X929 in the ice box to learn more about \"Carpal Tunnel Syndrome: Exercises. \"     If you do not have an account, please click on the \"Sign Up Now\" link.   Current as of: March 2, 2020               Content Version: 12.6  © 2279-4449 [employer]. Care instructions adapted under license by [facility] ([facility]). If you have questions about a medical condition or this instruction, always ask your healthcare professional. [address] any warranty or liability for your use of this information.

## 2020-11-25 ENCOUNTER — TELEPHONE (OUTPATIENT)
Dept: FAMILY MEDICINE CLINIC | Age: 65
End: 2020-11-25

## 2020-11-25 LAB — SARS-COV-2: NOT DETECTED

## 2020-11-25 NOTE — TELEPHONE ENCOUNTER
----- Message from Carrillo Norris DO sent at 11/25/2020  5:08 AM EST -----  Please let pt know that COVID testing is negative.

## 2021-04-16 DIAGNOSIS — I10 ESSENTIAL HYPERTENSION: ICD-10-CM

## 2021-04-16 RX ORDER — AMLODIPINE BESYLATE 10 MG/1
TABLET ORAL
Qty: 90 TABLET | Refills: 3 | Status: SHIPPED | OUTPATIENT
Start: 2021-04-16 | End: 2022-04-29

## 2021-04-16 NOTE — TELEPHONE ENCOUNTER
Recent Visits  Date Type Provider Dept   11/24/20 Office Visit Maximino Rodríguez, DO [facility]   08/21/20 Office Visit Maximino Rodríguez, DO [facility]   06/04/20 Office Visit Maximino Rodríguez, DO [facility]   03/20/20 Office Visit Maximino Rodríguez, DO [facility]   11/25/19 Office Visit Maximino Rodríguez, DO [facility]   Showing recent visits within past 540 days with a meds authorizing provider and meeting all other requirements     Future Appointments  Date Type Provider Dept   08/23/21 Appointment Maximino Rodríguez, DO [facility]   Showing future appointments within next 150 days with a meds authorizing provider and meeting all other requirements     Future Appointments   Date Time Provider [place]   8/23/2021  8:40 AM Maximino Rodríguez, [facility]

## 2021-05-25 ENCOUNTER — TELEPHONE (OUTPATIENT)
Dept: FAMILY MEDICINE CLINIC | Age: 66
End: 2021-05-25

## 2021-05-25 DIAGNOSIS — I10 ESSENTIAL HYPERTENSION: Primary | Chronic | ICD-10-CM

## 2021-05-25 DIAGNOSIS — R73.9 HYPERGLYCEMIA: ICD-10-CM

## 2021-05-25 DIAGNOSIS — R73.03 PREDIABETES: ICD-10-CM

## 2021-05-25 NOTE — PROGRESS NOTES
Chief Complaint   Patient presents with    Medicare AWV    Other     prostate trouble       History obtained from the patient. SUBJECTIVE:  April Pimentel is a 77 y.o. male that presents today for     -HTN:    HPI:    Taking meds as prescribed ?: yes  Tolerating well ?: yes  Side Effects ?: denies  BP at home ?: <140/90  Working on TLCS ?: yes  Chest Pain/SOB/Palpitations? denies    BP Readings from Last 3 Encounters:   05/26/21 132/66   11/24/20 136/80   08/21/20 127/63       -PreDM/Obesity: working on wt loss. wts up some though. Diet a bit better, doesn't exercise as much as he should    Wt Readings from Last 3 Encounters:   05/26/21 235 lb (106.6 kg)   11/24/20 231 lb (104.8 kg)   08/21/20 230 lb (104.3 kg)     Lab Results   Component Value Date    LABA1C 6.2 05/26/2021    LABA1C 6.0 05/21/2020    LABA1C 6.4 02/21/2020    LABA1C 6.1 08/12/2019    LABA1C 6.0 08/10/2018    LABA1C 5.9 12/01/2017         -Vit D def: hx of vit d def, repeat labs WNL previsously. Doing well      -BPH/Elevated PSA:  Follows with urology at [address] [place] [address] on robotic partial prostatectomy in June, has apt 6/8 to discuss  Last few wks having inc LUTS, some burning and pelvic pain  No fevers  Mild back pain  No hematuria.    Declines prostate exam today      Age/Gender Health Maintenance    Lipid -   Lab Results   Component Value Date    CHOL 174 08/17/2020    CHOL 165 08/12/2019    CHOL 166 08/10/2018     Lab Results   Component Value Date    TRIG 124 08/17/2020    TRIG 99 08/12/2019    TRIG 104 08/10/2018     Lab Results   Component Value Date    HDL 36 08/17/2020    HDL 46 08/12/2019    HDL 36 08/10/2018     Lab Results   Component Value Date    LDLCALC 113 08/17/2020    LDLCALC 99 08/12/2019    LDLCALC 109 08/10/2018       DM Screen -   Lab Results   Component Value Date    GLUCOSE 120 08/17/2020     Lab Results   Component Value Date    LABA1C 6.2 05/26/2021    LABA1C 6.0 05/21/2020    LABA1C 6.4 02/21/2020    LABA1C 6.1 08/12/2019    LABA1C 6.0 08/10/2018    LABA1C 5.9 12/01/2017       113 (OCT 2015)/110 (JUNE 2016)/100 (DEC 2017)    With a1c 6.1 (SEPT 2017)    TSH - 4.090 (DEC 2017)    Lab Results   Component Value Date    TSH 2.590 08/17/2020       Colon Cancer Screening - NEG Cologuard June 2020  Lung Cancer Screening (Age 54 to [de-identified] with 30 pack year hx, current smoker or quit within past 15 years) - never smoker    Tetanus - UTD July 2015  Influenza Vaccine - UTD FALL 2019  Pneumonia Vaccine - UTD PCV 13 in AUG 2019/PPV 23 in AUG 2020  Zoster - UTD shingrix x 2    PSA testing discussion - Follows with urology at [facility]    Lab Results   Component Value Date    PSA 6.53 (H) 10/07/2020    PSA 5.84 (H) 08/02/2019    PSA 5.3 08/10/2018     AAA Screening - never smoker       Current Outpatient Medications   Medication Sig Dispense Refill    ciprofloxacin (CIPRO) 500 MG tablet Take 1 tablet by mouth 2 times daily for 10 days 20 tablet 0    amLODIPine (NORVASC) 10 MG tablet Take 1 tablet by mouth once daily 90 tablet 3    tamsulosin (FLOMAX) 0.4 MG capsule TAKE 1 CAPSULE BY MOUTH ONCE DAILY AT BEDTIME      tiZANidine (ZANAFLEX) 4 MG tablet Take 1 tablet by mouth 3 times daily as needed (back pain) 45 tablet 0    losartan (COZAAR) 100 MG tablet TAKE 1 TABLET DAILY 90 tablet 3    oxybutynin (DITROPAN-XL) 10 MG extended release tablet TAKE 1 TABLET BY MOUTH ONCE DAILY  2    naproxen (NAPROSYN) 500 MG tablet Take 1 tablet by mouth 2 times daily (with meals) (Patient taking differently: Take 500 mg by mouth 2 times daily (with meals) As needed) 180 tablet 1    lidocaine (XYLOCAINE) 5 % ointment Apply topically as needed.  50 g 1    Mirabegron ER (MYRBETRIQ) 50 MG TB24 Take 50 mg by mouth      hydrocortisone (PROCTOZONE-HC) 2.5 % rectal cream APPLY ONE CREAM RECTALLY TWICE DAILY FOR  THE  NEXT  FEW  DAYS 3 Tube 3    Multiple Vitamins-Minerals (MULTIVITAMIN PO) Take by mouth daily      nystatin (MYCOSTATIN) 878981 UNIT/GM cream Apply topically as needed Apply topically 2 times daily. As needed      VIAGRA 100 MG TABS        No current facility-administered medications for this visit. Orders Placed This Encounter   Medications    ciprofloxacin (CIPRO) 500 MG tablet     Sig: Take 1 tablet by mouth 2 times daily for 10 days     Dispense:  20 tablet     Refill:  0         All medications reviewed and reconciled, including OTC and herbal medications. Updated list given to patient. Patient Active Problem List   Diagnosis    Dermatitis    Hypertension    Erectile dysfunction    BPH with elevated PSA    Prediabetes    Vitamin D deficiency    Right hand pain    Carpal tunnel syndrome of right wrist    Obesity (BMI 30-39. 9)       Past Medical History:   Diagnosis Date    BPH with elevated PSA 7/17/2015    Follows with urology for this and elevated PSA    Dermatitis     follows with derm, Dr. Glo Abraham Erectile dysfunction     Hypertension     Obesity (BMI 30-39. 9)     Prediabetes 7/29/2016         Past Surgical History:   Procedure Laterality Date    PROSTATE SURGERY  2009             No Known Allergies      Social History     Tobacco Use    Smoking status: Never Smoker    Smokeless tobacco: Never Used   Substance Use Topics    Alcohol use: No       Family History   Problem Relation Age of Onset    High Blood Pressure Mother     Cancer Father         Lung Cancer    Colon Cancer Neg Hx     Prostate Cancer Neg Hx          I have reviewed the patient's past medical history, past surgical history, allergies, medications, social and family history and I have made updates where appropriate.       Review of Systems  Positive responses are highlighted in bold    Constitutional:  Fever, Chills, Night Sweats, Fatigue, Unexpected changes in weight  HENT:  Ear pain, Tinnitus, Nosebleeds, Trouble swallowing, Hearing loss, Sore throat  Cardiovascular:  Chest Pain, Palpitations, Orthopnea, Paroxysmal Nocturnal Dyspnea  Respiratory:  Cough, Wheezing, Shortness of breath, Chest tightness, Apnea  Gastrointestinal:  Nausea, Vomiting, Diarrhea, Constipation, Heartburn, Blood in stool  Genitourinary:  Difficulty or painful urination, Flank pain, Change in frequency, Urgency  Skin:  Color change, Rash, Itching, Wound  Musculoskeletal:  Joint pain, Back pain, Gait problems, Joint swelling, Myalgias  Neurological:  Dizziness, Headaches, Presyncope, Numbness, Seizures, Tremors  Endocrine:  Heat Intolerance, Cold Intolerance, Polydipsia, Polyphagia, Polyuria      PHYSICAL EXAM:  Vitals:    05/26/21 1111   BP: 132/66   Pulse: 88   Resp: 10   Temp: 98.6 °F (37 °C)   TempSrc: Oral   SpO2: 99%   Weight: 235 lb (106.6 kg)   Height: 5' 8\" (1.727 m)     Body mass index is 35.73 kg/m². Pain Score:   2 (pelvic pressure)    VS Reviewed  General Appearance: A&O x 3, No acute distress,well developed and well- nourished  Eyes: pupils equal, round, and reactive to light, extraocular eye movements intact, conjunctivae and eye lids without erythema  ENT: external ear and ear canal clear bilaterally, TMs intact and regular, nose without deformity, nasal mucosa and turbinates normal without polyps, oropharynx normal, dentition is normal for age  Neck: supple and non-tender without mass, no thyromegaly or thyroid nodules, no cervical lymphadenopathy  Pulmonary/Chest: clear to auscultation bilaterally- no wheezes, rales or rhonchi, normal air movement, no respiratory distress or retractions  Cardiovascular: S1 and S2 auscultated w/ RRR. No murmurs, rubs, clicks, or gallops, distal pulses intact. Abdomen: soft, non-tender, non-distended, bowel sounds physiologic,  no rebound or guarding, no masses or hernias noted. Liver and spleen without enlargement. Extremities: no cyanosis, clubbing or edema of the lower extremities. Skin: warm and dry, no rash or erythema      ASSESSMENT & PLAN  1.  Essential hypertension    At goal  con't meds  Lab results pending for today    2. Prediabetes    con't diet changes  Work on wt loss  Await labs from today, f/u those by phone    3. Hyperglycemia      4. Obesity (BMI 30-39. 9)    Discussed wt loss strategies    5. Vitamin D deficiency    Monitor periodically  Stable prior    6. BPH with elevated PSA    Worse over time  con't flomax/ditropan  S/s today c/w probable acute prostatitis  Will treat with cipro for 10 days  Keep apt with urology to discuss surgery  All questions answered  F/u if no better    - ciprofloxacin (CIPRO) 500 MG tablet; Take 1 tablet by mouth 2 times daily for 10 days  Dispense: 20 tablet; Refill: 0    7. Acute prostatitis    As above    - ciprofloxacin (CIPRO) 500 MG tablet; Take 1 tablet by mouth 2 times daily for 10 days  Dispense: 20 tablet; Refill: 0      DISPOSITION    Return in 3 months (on 2021) for follow-up on chronic medical conditions, sooner as needed. Ted Wilkins released without restrictions. Future Appointments   Date Time Provider Ju Urbina   2021  8:40 AM [address] [place] [address] received counseling on the following healthy behaviors: nutrition, exercise and medication adherence    Patient given educational materials on: See Attached    I have instructed Ted Wilkins to complete a self tracking handout on Blood Pressures  and instructed them to bring it with them to his next appointment. Barriers to learning and self management: none    Discussed use, benefit, and side effects of prescribed medications. Barriers to medication compliance addressed. All patient questions answered. Pt voiced understanding. Electronically signed by Nichol Colbert DO on 2021 at 12:22 PM        Medicare Annual Wellness Visit  Name: Kavon Upton Date: 2021   MRN: [id number] Sex: Male   Age: 77 y.o.  Ethnicity: Non-/Non    : 1955 Race: [place] [employer] is here for Medicare AWV and Other (prostate trouble)    Screenings for behavioral, psychosocial and functional/safety risks, and cognitive dysfunction are all negative except as indicated below. These results, as well as other patient data from the [address] form, are documented in Flowsheets linked to this Encounter. No Known Allergies      Prior to Visit Medications    Medication Sig Taking? Authorizing Provider   ciprofloxacin (CIPRO) 500 MG tablet Take 1 tablet by mouth 2 times daily for 10 days Yes Crissy Vargas DO   amLODIPine (NORVASC) 10 MG tablet Take 1 tablet by mouth once daily Yes Crissy Vargas DO   tamsulosin (FLOMAX) 0.4 MG capsule TAKE 1 CAPSULE BY MOUTH ONCE DAILY AT BEDTIME Yes Meghna Holloway   tiZANidine (ZANAFLEX) 4 MG tablet Take 1 tablet by mouth 3 times daily as needed (back pain) Yes Patricio Almeida DO   losartan (COZAAR) 100 MG tablet TAKE 1 TABLET DAILY Yes Patricio Almeida DO   oxybutynin (DITROPAN-XL) 10 MG extended release tablet TAKE 1 TABLET BY MOUTH ONCE DAILY Yes Meghna Holloway   naproxen (NAPROSYN) 500 MG tablet Take 1 tablet by mouth 2 times daily (with meals)  Patient taking differently: Take 500 mg by mouth 2 times daily (with meals) As needed Yes HAROLDO Esposito CNP   lidocaine (XYLOCAINE) 5 % ointment Apply topically as needed. Yes HAROLDO Esposito CNP   Mirabegron ER (MYRBETRIQ) 50 MG TB24 Take 50 mg by mouth Yes Historical Provider, MD   hydrocortisone (PROCTOZONE-HC) 2.5 % rectal cream APPLY ONE CREAM RECTALLY TWICE DAILY FOR  THE  NEXT  FEW  DAYS Yes Crissy Vargas,    Multiple Vitamins-Minerals (MULTIVITAMIN PO) Take by mouth daily Yes Historical Provider, MD   nystatin (MYCOSTATIN) [id number] UNIT/GM cream Apply topically as needed Apply topically 2 times daily.  As needed Yes Historical Provider, MD   VIAGRA 100 MG TABS  Yes Historical Provider, MD         Past Medical History:   Diagnosis Date    BPH with elevated PSA 7/17/2015    Follows with urology for this and elevated PSA    Dermatitis     follows with uriel, Dr. Meek Estrella Erectile dysfunction     Hypertension     Obesity (BMI 30-39. 9)     Prediabetes 7/29/2016       Past Surgical History:   Procedure Laterality Date    PROSTATE SURGERY  2009             Family History   Problem Relation Age of Onset    High Blood Pressure Mother     Cancer Father         Lung Cancer    Colon Cancer Neg Hx     Prostate Cancer Neg Hx        CareTeam (Including outside providers/suppliers regularly involved in providing care):   Patient Care Team:  Goyo Russell DO as PCP - General (Family Medicine)  Goyo Russell DO as PCP - [facility] Empaneled Provider    Wt Readings from Last 3 Encounters:   05/26/21 235 lb (106.6 kg)   11/24/20 231 lb (104.8 kg)   08/21/20 230 lb (104.3 kg)     Vitals:    05/26/21 1111   BP: 132/66   Pulse: 88   Resp: 10   Temp: 98.6 °F (37 °C)   TempSrc: Oral   SpO2: 99%   Weight: 235 lb (106.6 kg)   Height: 5' 8\" (1.727 m)     Body mass index is 35.73 kg/m². Based upon direct observation of the patient, evaluation of cognition reveals recent and remote memory intact. Patient's complete Health Risk Assessment and screening values have been reviewed and are found in Flowsheets. The following problems were reviewed today and where indicated follow up appointments were made and/or referrals ordered. Positive Risk Factor Screenings with Interventions:            General Health and ACP:  General  In general, how would you say your health is?: Good  In the past 7 days, have you experienced any of the following?  New or Increased Pain, New or Increased Fatigue, Loneliness, Social Isolation, Stress or Anger?: (!) New or Increased Pain  Do you get the social and emotional support that you need?: Yes  Do you have a Living Will?: Yes  Advance Directives     Power of  Living Will ACP-Advance Directive ACP-Power of     Not on File Not on File Not on File Not on File General Health Risk Interventions:  · Pain issues: prostate issues    Health Habits/Nutrition:  Health Habits/Nutrition  Do you exercise for at least 20 minutes 2-3 times per week?: Yes  Have you lost any weight without trying in the past 3 months?: No  Do you eat only one meal per day?: No  Have you seen the dentist within the past year?: Yes  Body mass index: (!) 35.73  Health Habits/Nutrition Interventions:  · Nutritional issues:  educational materials for healthy, well-balanced diet provided       Personalized Preventive Plan   Current Health Maintenance Status  Immunization History   Administered Date(s) Administered    COVID-19, Pfizer, PF, 30mcg/0.3mL 03/01/2021, 03/29/2021    Influenza Virus Vaccine 10/21/2015    Influenza, Quadv, IM, PF (6 mo and older Fluzone, Flulaval, Fluarix, and 3 yrs and older Afluria) 12/14/2017, 11/08/2018    Influenza, Quadv, adjuvanted, 65 yrs +, IM, PF (Fluad) 10/15/2020    Influenza, Triv, inactivated, subunit, adjuvanted, IM (Fluad 65 yrs and older) 10/03/2019    Pneumococcal Conjugate 13-valent ([id number]) 08/16/2019    Pneumococcal Polysaccharide ([id number]) 08/21/2020    Tdap (Boostrix, Adacel) 07/17/2015    Zoster Recombinant (Shingrix) 10/11/2019, 01/25/2020        Health Maintenance   Topic Date Due    Annual Wellness Visit (AWV)  05/26/2021    Lipid screen  08/17/2021    Potassium monitoring  08/17/2021    PSA counseling  10/07/2021    Creatinine monitoring  10/22/2021    A1C test (Diabetic or Prediabetic)  05/26/2022    Colon cancer screen fecal DNA test (Cologuard)  06/17/2023    DTaP/Tdap/Td vaccine (2 - Td) 07/17/2025    Flu vaccine  Completed    Shingles Vaccine  Completed    Pneumococcal 65+ years Vaccine  Completed    COVID-19 Vaccine  Completed    Hepatitis C screen  Completed    Hepatitis A vaccine  Aged Out    Hepatitis B vaccine  Aged Out    Hib vaccine  Aged Out    Meningococcal (ACWY) vaccine  Aged Out     Recommendations for Preventive Services Due: see orders and patient instructions/AVS.  . Recommended screening schedule for the next 5-10 years is provided to the patient in written form: see Patient Danis Serrato was seen today for medicare awv. Diagnoses and all orders for this visit:    Routine general medical examination at a health care [facility]      Care plan reviewed with and given to patient.        Electronically signed by Adah Canavan, DO on 5/26/2021 at 12:22 PM

## 2021-05-25 NOTE — TELEPHONE ENCOUNTER
Fast 8 hours  Let me know if questions, thanks! Diagnosis Orders   1. Essential hypertension  CBC Auto Differential    Comprehensive Metabolic Panel    Hemoglobin A1C    Lipid Panel    Microalbumin / Creatinine Urine Ratio    TSH with Reflex   2. Prediabetes  CBC Auto Differential    Comprehensive Metabolic Panel    Hemoglobin A1C    Lipid Panel    Microalbumin / Creatinine Urine Ratio    TSH with Reflex   3.  Hyperglycemia  CBC Auto Differential    Comprehensive Metabolic Panel    Hemoglobin A1C    Lipid Panel    Microalbumin / Creatinine Urine Ratio    TSH with Reflex     Future Appointments   Date Time Provider Ju Urbina   5/26/2021 11:00 AM [facility] [place]   8/23/2021  8:40 AM Arturo Barboza, [address] [place] [address]

## 2021-05-25 NOTE — TELEPHONE ENCOUNTER
Pt Is wondering about yearly blood work if he needs to complete before appt tomorrow or if dr Randi Bah will order it at visit. Please advise.     Future Appointments   Date Time Provider Ju Urbina   5/26/2021 11:00 AM Bruno Cabral DO [facility] [place]   8/23/2021  8:40 AM [id number] [facility]

## 2021-05-25 NOTE — TELEPHONE ENCOUNTER
----- Message from Dariela Reynoso sent at 5/25/2021  9:00 AM EDT -----  Subject: Referral Request    QUESTIONS   Reason for referral request? yearly bw   Has the physician seen you for this condition before? No   Preferred Specialist (if applicable)? Do you already have an appointment scheduled? Yes  Select Scheduled Date? 2021-05-26  Select Scheduled Physician? Christelle Kate   Additional Information for Provider?   ---------------------------------------------------------------------------  --------------  [address]  What is the best way for the office to contact you? OK to leave message on   voicemail  Preferred Call Back Phone Number?  [phone]

## 2021-05-26 ENCOUNTER — NURSE ONLY (OUTPATIENT)
Dept: LAB | Age: 66
End: 2021-05-26

## 2021-05-26 ENCOUNTER — OFFICE VISIT (OUTPATIENT)
Dept: FAMILY MEDICINE CLINIC | Age: 66
End: 2021-05-26
Payer: MEDICARE

## 2021-05-26 VITALS
HEART RATE: 88 BPM | BODY MASS INDEX: 35.61 KG/M2 | SYSTOLIC BLOOD PRESSURE: 132 MMHG | RESPIRATION RATE: 10 BRPM | OXYGEN SATURATION: 99 % | TEMPERATURE: 98.6 F | HEIGHT: 68 IN | DIASTOLIC BLOOD PRESSURE: 66 MMHG | WEIGHT: 235 LBS

## 2021-05-26 DIAGNOSIS — E66.9 OBESITY (BMI 30-39.9): Chronic | ICD-10-CM

## 2021-05-26 DIAGNOSIS — I10 ESSENTIAL HYPERTENSION: ICD-10-CM

## 2021-05-26 DIAGNOSIS — N41.0 ACUTE PROSTATITIS: ICD-10-CM

## 2021-05-26 DIAGNOSIS — R73.03 PREDIABETES: ICD-10-CM

## 2021-05-26 DIAGNOSIS — E55.9 VITAMIN D DEFICIENCY: ICD-10-CM

## 2021-05-26 DIAGNOSIS — R97.20 BPH WITH ELEVATED PSA: ICD-10-CM

## 2021-05-26 DIAGNOSIS — R73.9 HYPERGLYCEMIA: ICD-10-CM

## 2021-05-26 DIAGNOSIS — Z00.00 ROUTINE GENERAL MEDICAL EXAMINATION AT A HEALTH CARE FACILITY: Primary | ICD-10-CM

## 2021-05-26 DIAGNOSIS — N40.0 BPH WITH ELEVATED PSA: ICD-10-CM

## 2021-05-26 DIAGNOSIS — I10 ESSENTIAL HYPERTENSION: Chronic | ICD-10-CM

## 2021-05-26 LAB
ALBUMIN SERPL-MCNC: 4.2 G/DL (ref 3.5–5.1)
ALP BLD-CCNC: 64 U/L (ref 38–126)
ALT SERPL-CCNC: 16 U/L (ref 11–66)
ANION GAP SERPL CALCULATED.3IONS-SCNC: 9 MEQ/L (ref 8–16)
AST SERPL-CCNC: 21 U/L (ref 5–40)
AVERAGE GLUCOSE: 126 MG/DL (ref 70–126)
BASOPHILS # BLD: 0.3 %
BASOPHILS ABSOLUTE: 0 THOU/MM3 (ref 0–0.1)
BILIRUB SERPL-MCNC: 0.3 MG/DL (ref 0.3–1.2)
BUN BLDV-MCNC: 14 MG/DL (ref 7–22)
CALCIUM SERPL-MCNC: 9.2 MG/DL (ref 8.5–10.5)
CHLORIDE BLD-SCNC: 104 MEQ/L (ref 98–111)
CHOLESTEROL, TOTAL: 166 MG/DL (ref 100–199)
CO2: 27 MEQ/L (ref 23–33)
CREAT SERPL-MCNC: 0.7 MG/DL (ref 0.4–1.2)
CREATININE, URINE: 170.3 MG/DL
EOSINOPHIL # BLD: 1.7 %
EOSINOPHILS ABSOLUTE: 0.1 THOU/MM3 (ref 0–0.4)
ERYTHROCYTE [DISTWIDTH] IN BLOOD BY AUTOMATED COUNT: 13.4 % (ref 11.5–14.5)
ERYTHROCYTE [DISTWIDTH] IN BLOOD BY AUTOMATED COUNT: 45 FL (ref 35–45)
GFR SERPL CREATININE-BSD FRML MDRD: > 90 ML/MIN/1.73M2
GLUCOSE BLD-MCNC: 113 MG/DL (ref 70–108)
HBA1C MFR BLD: 6.2 % (ref 4.4–6.4)
HCT VFR BLD CALC: 44.6 % (ref 42–52)
HDLC SERPL-MCNC: 37 MG/DL
HEMOGLOBIN: 15 GM/DL (ref 14–18)
IMMATURE GRANS (ABS): 0.02 THOU/MM3 (ref 0–0.07)
IMMATURE GRANULOCYTES: 0.3 %
LDL CHOLESTEROL CALCULATED: 108 MG/DL
LYMPHOCYTES # BLD: 23.1 %
LYMPHOCYTES ABSOLUTE: 1.5 THOU/MM3 (ref 1–4.8)
MCH RBC QN AUTO: 31.7 PG (ref 26–33)
MCHC RBC AUTO-ENTMCNC: 33.6 GM/DL (ref 32.2–35.5)
MCV RBC AUTO: 94.3 FL (ref 80–94)
MICROALBUMIN UR-MCNC: 1.7 MG/DL
MICROALBUMIN/CREAT UR-RTO: 10 MG/G (ref 0–30)
MONOCYTES # BLD: 11.4 %
MONOCYTES ABSOLUTE: 0.7 THOU/MM3 (ref 0.4–1.3)
NUCLEATED RED BLOOD CELLS: 0 /100 WBC
PLATELET # BLD: 269 THOU/MM3 (ref 130–400)
PMV BLD AUTO: 10.3 FL (ref 9.4–12.4)
POTASSIUM SERPL-SCNC: 4.5 MEQ/L (ref 3.5–5.2)
RBC # BLD: 4.73 MILL/MM3 (ref 4.7–6.1)
SEG NEUTROPHILS: 63.2 %
SEGMENTED NEUTROPHILS ABSOLUTE COUNT: 4 THOU/MM3 (ref 1.8–7.7)
SODIUM BLD-SCNC: 140 MEQ/L (ref 135–145)
TOTAL PROTEIN: 7.2 G/DL (ref 6.1–8)
TRIGL SERPL-MCNC: 105 MG/DL (ref 0–199)
TSH SERPL DL<=0.05 MIU/L-ACNC: 2.6 UIU/ML (ref 0.4–4.2)
WBC # BLD: 6.3 THOU/MM3 (ref 4.8–10.8)

## 2021-05-26 PROCEDURE — 1123F ACP DISCUSS/DSCN MKR DOCD: CPT | Performed by: FAMILY MEDICINE

## 2021-05-26 PROCEDURE — 3017F COLORECTAL CA SCREEN DOC REV: CPT | Performed by: FAMILY MEDICINE

## 2021-05-26 PROCEDURE — G8417 CALC BMI ABV UP PARAM F/U: HCPCS | Performed by: FAMILY MEDICINE

## 2021-05-26 PROCEDURE — G8427 DOCREV CUR MEDS BY ELIG CLIN: HCPCS | Performed by: FAMILY MEDICINE

## 2021-05-26 PROCEDURE — 4040F PNEUMOC VAC/ADMIN/RCVD: CPT | Performed by: FAMILY MEDICINE

## 2021-05-26 PROCEDURE — 1036F TOBACCO NON-USER: CPT | Performed by: FAMILY MEDICINE

## 2021-05-26 PROCEDURE — G0439 PPPS, SUBSEQ VISIT: HCPCS | Performed by: FAMILY MEDICINE

## 2021-05-26 PROCEDURE — 99213 OFFICE O/P EST LOW 20 MIN: CPT | Performed by: FAMILY MEDICINE

## 2021-05-26 RX ORDER — CIPROFLOXACIN 500 MG/1
500 TABLET, FILM COATED ORAL 2 TIMES DAILY
Qty: 20 TABLET | Refills: 0 | Status: SHIPPED | OUTPATIENT
Start: 2021-05-26 | End: 2021-06-05

## 2021-05-26 ASSESSMENT — PATIENT HEALTH QUESTIONNAIRE - PHQ9
SUM OF ALL RESPONSES TO PHQ9 QUESTIONS 1 & 2: 2
2. FEELING DOWN, DEPRESSED OR HOPELESS: 2
1. LITTLE INTEREST OR PLEASURE IN DOING THINGS: 0
SUM OF ALL RESPONSES TO PHQ QUESTIONS 1-9: 2

## 2021-05-26 ASSESSMENT — LIFESTYLE VARIABLES: HOW OFTEN DO YOU HAVE A DRINK CONTAINING ALCOHOL: 0

## 2021-05-27 ENCOUNTER — TELEPHONE (OUTPATIENT)
Dept: FAMILY MEDICINE CLINIC | Age: 66
End: 2021-05-27

## 2021-07-31 DIAGNOSIS — I10 ESSENTIAL HYPERTENSION: Chronic | ICD-10-CM

## 2021-08-02 RX ORDER — LOSARTAN POTASSIUM 100 MG/1
TABLET ORAL
Qty: 90 TABLET | Refills: 3 | Status: SHIPPED | OUTPATIENT
Start: 2021-08-02 | End: 2022-07-19

## 2021-08-02 NOTE — TELEPHONE ENCOUNTER
Recent Visits  Date Type Provider Dept   05/26/21 Office Visit Leslie Alejandro, DO [facility]   11/24/20 Office Visit Leslie Alejandro, DO [facility]   08/21/20 Office Visit Leslie Alejandro, DO [facility]   06/04/20 Office Visit Leslie Alejandro, DO [facility]   03/20/20 Office Visit Leslie Alejandro, DO [facility]   Showing recent visits within past 540 days with a meds authorizing provider and meeting all other requirements  Future Appointments  Date Type Provider Dept   08/23/21 Appointment Leslie Alejandro, DO [facility]   Showing future appointments within next 150 days with a meds authorizing provider and meeting all other requirements      Future Appointments   Date Time Provider Ju Urbina   8/23/2021  8:40 AM Leslie Alejandro, [address] [place] [address]

## 2021-08-19 ENCOUNTER — TELEPHONE (OUTPATIENT)
Dept: FAMILY MEDICINE CLINIC | Age: 66
End: 2021-08-19

## 2021-08-19 NOTE — TELEPHONE ENCOUNTER
----- Message from Regina Clark 10 sent at 8/19/2021  8:15 AM EDT -----  Subject: Message to Provider    QUESTIONS  Information for Provider? Patient has an appointment on Monday, August 23rd and wants to know if they need to get blood work done for this   appointment and if they do they need the orders put in today since   appointment is Monday   ---------------------------------------------------------------------------  --------------  [address]  What is the best way for the office to contact you? OK to leave message on   voicemail  Preferred Call Back Phone Number? [phone]  ---------------------------------------------------------------------------  --------------  SCRIPT ANSWERS  Relationship to Patient?  Self

## 2021-08-19 NOTE — TELEPHONE ENCOUNTER
He is good  Had labs in May of this year    Let me know if questions, thanks!     Future Appointments   Date Time Provider Ju Urbina   8/23/2021  8:40 AM Isaias Tirado, [address] [facility]

## 2021-08-22 NOTE — PROGRESS NOTES
2016)/100 (DEC 2017)    With a1c 6.1 (SEPT 2017)    TSH - 4.090 (DEC 2017)    Lab Results   Component Value Date    TSH 2.600 05/26/2021       Colon Cancer Screening - NEG Cologuard June 2020  Lung Cancer Screening (Age 54 to [de-identified] with 30 pack year hx, current smoker or quit within past 15 years) - never smoker    Tetanus - UTD July 2015  Influenza Vaccine - Candidate FALL 2021  Pneumonia Vaccine - UTD PCV 13 in AUG 2019/PPV 23 in AUG 2020  Zoster - UTD shingrix x 2    PSA testing discussion - Follows with urology at [facility]    Lab Results   Component Value Date    PSA 6.53 (H) 10/07/2020    PSA 5.84 (H) 08/02/2019    PSA 5.3 08/10/2018     AAA Screening - never smoker       Current Outpatient Medications   Medication Sig Dispense Refill    losartan (COZAAR) 100 MG tablet TAKE 1 TABLET DAILY 90 tablet 3    amLODIPine (NORVASC) 10 MG tablet Take 1 tablet by mouth once daily 90 tablet 3    tamsulosin (FLOMAX) 0.4 MG capsule TAKE 1 CAPSULE BY MOUTH ONCE DAILY AT BEDTIME      lidocaine (XYLOCAINE) 5 % ointment Apply topically as needed. 50 g 1    Mirabegron ER (MYRBETRIQ) 50 MG TB24 Take 50 mg by mouth      hydrocortisone (PROCTOZONE-HC) 2.5 % rectal cream APPLY ONE CREAM RECTALLY TWICE DAILY FOR  THE  NEXT  FEW  DAYS 3 Tube 3    Multiple Vitamins-Minerals (MULTIVITAMIN PO) Take by mouth daily      nystatin (MYCOSTATIN) 841038 UNIT/GM cream Apply topically as needed Apply topically 2 times daily. As needed      VIAGRA 100 MG TABS        No current facility-administered medications for this visit. No orders of the defined types were placed in this encounter. All medications reviewed and reconciled, including OTC and herbal medications. Updated list given to patient.        Patient Active Problem List   Diagnosis    Dermatitis    Hypertension    Erectile dysfunction    BPH with elevated PSA    Prediabetes    Vitamin D deficiency    Right hand pain    Carpal tunnel syndrome of right wrist    Obesity (BMI 30-39. 9)       Past Medical History:   Diagnosis Date    BPH with elevated PSA 7/17/2015    Follows with urology for this and elevated PSA    Dermatitis     follows with derm, Dr. Tyna Cockayne Erectile dysfunction     Hypertension     Obesity (BMI 30-39. 9)     Prediabetes 7/29/2016         Past Surgical History:   Procedure Laterality Date    PROSTATE SURGERY  2009        PROSTATECTOMY N/A 07/01/2021    Robotic[facility]assisted laparoscopic suprapubic prostatectomy @ [facility]         No Known Allergies      Social History     Tobacco Use    Smoking status: Never Smoker    Smokeless tobacco: Never Used   Substance Use Topics    Alcohol use: No       Family History   Problem Relation Age of Onset    High Blood Pressure Mother     Cancer Father         Lung Cancer    Colon Cancer Neg Hx     Prostate Cancer Neg Hx          I have reviewed the patient's past medical history, past surgical history, allergies, medications, social and family history and I have made updates where appropriate.       Review of Systems  Positive responses are highlighted in bold    Constitutional:  Fever, Chills, Night Sweats, Fatigue, Unexpected changes in weight  HENT:  Ear pain, Tinnitus, Nosebleeds, Trouble swallowing, Hearing loss, Sore throat  Cardiovascular:  Chest Pain, Palpitations, Orthopnea, Paroxysmal Nocturnal Dyspnea  Respiratory:  Cough, Wheezing, Shortness of breath, Chest tightness, Apnea  Gastrointestinal:  Nausea, Vomiting, Diarrhea, Constipation, Heartburn, Blood in stool  Genitourinary:  Difficulty or painful urination, Flank pain, Change in frequency, Urgency  Skin:  Color change, Rash, Itching, Wound  Musculoskeletal:  Joint pain, Back pain, Gait problems, Joint swelling, Myalgias  Neurological:  Dizziness, Headaches, Presyncope, Numbness, Seizures, Tremors  Endocrine:  Heat Intolerance, Cold Intolerance, Polydipsia, Polyphagia, Polyuria      PHYSICAL EXAM:  Vitals:    08/23/21 0853   BP: 120/80   Pulse: 72 Resp: 12   Temp: 98 °F (36.7 °C)   TempSrc: Oral   SpO2: 96%   Weight: 221 lb 4 oz (100.4 kg)   Height: 5' 8\" (1.727 m)     Body mass index is 33.64 kg/m². Pain Score:   0 - No pain    VS Reviewed  General Appearance: A&O x 3, No acute distress,well developed and well- nourished  Eyes: pupils equal, round, and reactive to light, extraocular eye movements intact, conjunctivae and eye lids without erythema  ENT: external ear and ear canal clear bilaterally, TMs intact and regular, nose without deformity, nasal mucosa and turbinates normal without polyps, oropharynx normal, dentition is normal for age  Neck: supple and non-tender without mass, no thyromegaly or thyroid nodules, no cervical lymphadenopathy  Pulmonary/Chest: clear to auscultation bilaterally- no wheezes, rales or rhonchi, normal air movement, no respiratory distress or retractions  Cardiovascular: S1 and S2 auscultated w/ RRR. No murmurs, rubs, clicks, or gallops, distal pulses intact. Abdomen: soft, non-tender, non-distended, bowel sounds physiologic,  no rebound or guarding, no masses or hernias noted. Liver and spleen without enlargement. Extremities: no cyanosis, clubbing or edema of the lower extremities. Skin: warm and dry, no rash or erythema      ASSESSMENT & PLAN  1. Essential hypertension    Stable  At goal  con't norvasc and cozaar  Labs UTD    2. Prediabetes    Stable  con't lifestyle changes  Labs in NOV, has order    3. Obesity (BMI 30-39. 9)    Discussed wt loss strategies    4. Vitamin D deficiency    Stable  con't supplemenation  Monitor labs    5. BPH with elevated PSA    S/p surgery  Con't myrbetriq and uro f/u      DISPOSITION    Return in about 1 year (around 8/23/2022) for AWV, follow-up on chronic medical conditions, sooner as needed. Patty Ax released without restrictions. PATIENT COUNSELING    Barriers to learning and self management: none    Discussed use, benefit, and side effects of prescribed medications.   Barriers to medication compliance addressed. All patient questions answered. Pt voiced understanding.        Electronically signed by Samy Matthew DO on 8/23/2021 at 9:13 AM

## 2021-08-23 ENCOUNTER — OFFICE VISIT (OUTPATIENT)
Dept: FAMILY MEDICINE CLINIC | Age: 66
End: 2021-08-23
Payer: MEDICARE

## 2021-08-23 VITALS
OXYGEN SATURATION: 96 % | SYSTOLIC BLOOD PRESSURE: 120 MMHG | HEIGHT: 68 IN | HEART RATE: 72 BPM | DIASTOLIC BLOOD PRESSURE: 80 MMHG | BODY MASS INDEX: 33.53 KG/M2 | RESPIRATION RATE: 12 BRPM | TEMPERATURE: 98 F | WEIGHT: 221.25 LBS

## 2021-08-23 DIAGNOSIS — R97.20 BPH WITH ELEVATED PSA: ICD-10-CM

## 2021-08-23 DIAGNOSIS — E66.9 OBESITY (BMI 30-39.9): ICD-10-CM

## 2021-08-23 DIAGNOSIS — R73.03 PREDIABETES: ICD-10-CM

## 2021-08-23 DIAGNOSIS — I10 ESSENTIAL HYPERTENSION: Primary | ICD-10-CM

## 2021-08-23 DIAGNOSIS — E55.9 VITAMIN D DEFICIENCY: ICD-10-CM

## 2021-08-23 DIAGNOSIS — N40.0 BPH WITH ELEVATED PSA: ICD-10-CM

## 2021-08-23 PROCEDURE — 3017F COLORECTAL CA SCREEN DOC REV: CPT | Performed by: FAMILY MEDICINE

## 2021-08-23 PROCEDURE — G8427 DOCREV CUR MEDS BY ELIG CLIN: HCPCS | Performed by: FAMILY MEDICINE

## 2021-08-23 PROCEDURE — 1036F TOBACCO NON-USER: CPT | Performed by: FAMILY MEDICINE

## 2021-08-23 PROCEDURE — 1123F ACP DISCUSS/DSCN MKR DOCD: CPT | Performed by: FAMILY MEDICINE

## 2021-08-23 PROCEDURE — 99214 OFFICE O/P EST MOD 30 MIN: CPT | Performed by: FAMILY MEDICINE

## 2021-08-23 PROCEDURE — 4040F PNEUMOC VAC/ADMIN/RCVD: CPT | Performed by: FAMILY MEDICINE

## 2021-08-23 PROCEDURE — G8417 CALC BMI ABV UP PARAM F/U: HCPCS | Performed by: FAMILY MEDICINE

## 2021-09-27 ENCOUNTER — IMMUNIZATION (OUTPATIENT)
Dept: FAMILY MEDICINE CLINIC | Age: 66
End: 2021-09-27
Payer: MEDICARE

## 2021-09-27 DIAGNOSIS — Z23 FLU VACCINE NEED: Primary | ICD-10-CM

## 2021-09-27 PROCEDURE — G0008 ADMIN INFLUENZA VIRUS VAC: HCPCS | Performed by: FAMILY MEDICINE

## 2021-09-27 PROCEDURE — 90694 VACC AIIV4 NO PRSRV 0.5ML IM: CPT | Performed by: FAMILY MEDICINE

## 2021-10-08 ENCOUNTER — PATIENT MESSAGE (OUTPATIENT)
Dept: FAMILY MEDICINE CLINIC | Age: 66
End: 2021-10-08

## 2021-10-08 DIAGNOSIS — G47.00 INSOMNIA, UNSPECIFIED TYPE: Primary | ICD-10-CM

## 2021-10-11 RX ORDER — TRAZODONE HYDROCHLORIDE 50 MG/1
50 TABLET ORAL NIGHTLY
Qty: 90 TABLET | Refills: 1 | Status: SHIPPED | OUTPATIENT
Start: 2021-10-11 | End: 2021-12-07 | Stop reason: SINTOL

## 2021-10-11 NOTE — TELEPHONE ENCOUNTER
From: Carson Barrera  To: Telma Erickson DO  Sent: 10/8/2021 9:25 PM EDT  Subject: Non-Urgent Medical Question    That would be great, I would like to try it. I use [address] for prescriptions. Thank You for your help.      Jayda Ambrose

## 2021-11-12 ENCOUNTER — TELEPHONE (OUTPATIENT)
Dept: FAMILY MEDICINE CLINIC | Age: 66
End: 2021-11-12

## 2021-11-12 DIAGNOSIS — R73.9 HYPERGLYCEMIA: Primary | ICD-10-CM

## 2021-11-12 NOTE — TELEPHONE ENCOUNTER
Please let pt know that he is due for 6 month f/u Fasting sugar/a1c  Next few wks is fine  Let me know if questions, thanks! Diagnosis Orders   1.  Hyperglycemia  Glucose, random    Hemoglobin A1C     Future Appointments   Date Time Provider Ju Urbina   8/24/2022  9:00 AM Rachael Kelley, [address] [place] [address]

## 2021-11-20 ENCOUNTER — NURSE ONLY (OUTPATIENT)
Dept: LAB | Age: 66
End: 2021-11-20

## 2021-11-20 DIAGNOSIS — R73.9 HYPERGLYCEMIA: ICD-10-CM

## 2021-11-20 LAB
AVERAGE GLUCOSE: 129 MG/DL (ref 70–126)
GLUCOSE BLD-MCNC: 114 MG/DL (ref 70–108)
HBA1C MFR BLD: 6.3 % (ref 4.4–6.4)

## 2021-11-22 ENCOUNTER — TELEPHONE (OUTPATIENT)
Dept: FAMILY MEDICINE CLINIC | Age: 66
End: 2021-11-22

## 2021-11-22 NOTE — TELEPHONE ENCOUNTER
----- Message from Rafael Beltre DO sent at 11/21/2021 11:54 AM EST -----  Please let pt know that labs are stable in the prediabetic range  No better, no worse  Con't with working on diet changes and wt loss  Let me know if questions, thanks!

## 2021-12-06 PROBLEM — G47.00 INSOMNIA: Status: ACTIVE | Noted: 2021-12-06

## 2021-12-07 ENCOUNTER — OFFICE VISIT (OUTPATIENT)
Dept: FAMILY MEDICINE CLINIC | Age: 66
End: 2021-12-07
Payer: MEDICARE

## 2021-12-07 VITALS
HEART RATE: 82 BPM | BODY MASS INDEX: 33.71 KG/M2 | WEIGHT: 227.6 LBS | RESPIRATION RATE: 12 BRPM | SYSTOLIC BLOOD PRESSURE: 132 MMHG | HEIGHT: 69 IN | TEMPERATURE: 98.1 F | DIASTOLIC BLOOD PRESSURE: 70 MMHG | OXYGEN SATURATION: 98 %

## 2021-12-07 DIAGNOSIS — H81.12 BPPV (BENIGN PAROXYSMAL POSITIONAL VERTIGO), LEFT: Primary | ICD-10-CM

## 2021-12-07 DIAGNOSIS — G47.00 INSOMNIA, UNSPECIFIED TYPE: ICD-10-CM

## 2021-12-07 DIAGNOSIS — R73.03 PREDIABETES: Chronic | ICD-10-CM

## 2021-12-07 DIAGNOSIS — E66.9 OBESITY (BMI 30-39.9): Chronic | ICD-10-CM

## 2021-12-07 PROCEDURE — 1123F ACP DISCUSS/DSCN MKR DOCD: CPT | Performed by: FAMILY MEDICINE

## 2021-12-07 PROCEDURE — 3017F COLORECTAL CA SCREEN DOC REV: CPT | Performed by: FAMILY MEDICINE

## 2021-12-07 PROCEDURE — 99214 OFFICE O/P EST MOD 30 MIN: CPT | Performed by: FAMILY MEDICINE

## 2021-12-07 PROCEDURE — 1036F TOBACCO NON-USER: CPT | Performed by: FAMILY MEDICINE

## 2021-12-07 PROCEDURE — G8484 FLU IMMUNIZE NO ADMIN: HCPCS | Performed by: FAMILY MEDICINE

## 2021-12-07 PROCEDURE — G8417 CALC BMI ABV UP PARAM F/U: HCPCS | Performed by: FAMILY MEDICINE

## 2021-12-07 PROCEDURE — 4040F PNEUMOC VAC/ADMIN/RCVD: CPT | Performed by: FAMILY MEDICINE

## 2021-12-07 PROCEDURE — G8427 DOCREV CUR MEDS BY ELIG CLIN: HCPCS | Performed by: FAMILY MEDICINE

## 2021-12-07 NOTE — PATIENT INSTRUCTIONS
Patient Education        Vertigo: Exercises  Introduction  Here are some examples of exercises for you to try. The exercises may be suggested for a condition or for rehabilitation. Start each exercise slowly. Ease off the exercises if you start to have pain. You will be told when to start these exercises and which ones will work best for you. How to do the exercises  Exercise 1    1. Stand with a chair in front of you and a wall behind you. If you begin to fall, you may use them for support. 2. Stand with your feet together and your arms at your sides. 3. Move your head up and down 10 times. Exercise 2    1. Move your head side to side 10 times. Exercise 3    1. Move your head diagonally up and down 10 times. Exercise 4    1. Move your head diagonally up and down 10 times on the other side. Follow-up care is a key part of your treatment and safety. Be sure to make and go to all appointments, and call your doctor if you are having problems. It's also a good idea to know your test results and keep a list of the medicines you take. Where can you learn more? Go to https://AAVLifepeSocialare.Movaya. org and sign in to your ClubLocal account. Enter F349 in the LED Light Sense box to learn more about \"Vertigo: Exercises. \"     If you do not have an account, please click on the \"Sign Up Now\" link. Current as of: December 2, 2020               Content Version: 13.0  © 2006-2021 Healthwise, Incorporated. Care instructions adapted under license by [facility] ([facility]). If you have questions about a medical condition or this instruction, always ask your healthcare professional. [place] [address] any warranty or liability for your use of this information. Patient Education        Caw[place]orne Exercises for Vertigo: Care Instructions  Your Care Instructions  Simple exercises can help you regain your balance when you have vertigo.  If you have Ménière's disease, benign paroxysmal positional vertigo (BPPV), or another inner ear problem, you may have vertigo off and on. Do these exercises first thing in the morning and before you go to bed. You might get dizzy when you first start them. If this happens, try to do them for at least 5 minutes. Do a group of exercises at a time, starting at the top of the list. It may take several weeks before you can do all the exercises without feeling dizzy. Follow-up care is a key part of your treatment and safety. Be sure to make and go to all appointments, and call your doctor if you are having problems. It's also a good idea to know your test results and keep a list of the medicines you take. How can you care for yourself at home? Exercise 1  While sitting on the side of the bed and holding your head still:  · Look up as far as you can. · Look down as far as you can. · Look from side to side as far as you can. · Stretch your arm straight out in front of you. Focus on your index finger. Continue to focus on your finger while you bring it to your nose. Exercise 2  While sitting on the side of the bed:  · Bring your head as far back as you can. · Bring your head forward to touch your chin to your chest.  · Turn your head from side to side. · Do these exercises first with your eyes open. Then try with your eyes closed. Exercise 3  While sitting on the side of the bed:  · Shrug your shoulders straight upward, then relax them. · Bend over and try to touch the ground with your fingers. Then go back to a sitting position. · Toss a small ball from one hand to the other. Throw the ball higher than your eyes so you have to look up. Exercise 4  While standing (with someone close by if you feel uncomfortable):  · Repeat Exercise 1.  · Repeat Exercise 2.  · Pass a ball between your legs and above your head. · Sit down and then stand up. Repeat. Turn around in a [place] a different way each time you stand.   · With someone close by to help you, try the above exercises with your eyes closed. Exercise 5  In a room that is cleared of obstacles:  · Walk to a corner of the room, turn to your right, and walk back to the starting point. Now, repeat and turn left. · Walk up and down a slope. Now try stairs. · While holding on to someone's arm, try these exercises with your eyes closed. When should you call for help? Watch closely for changes in your health, and be sure to contact your doctor if:    · You do not get better as expected. Where can you learn more? Go to https://OTOYpepiceweb.[email or web address]. org and sign in to your ParQnow account. Enter O384 in the Answer.To box to learn more about \"Cawthorne Exercises for Vertigo: Care Instructions. \"     If you do not have an account, please click on the \"Sign Up Now\" link. Current as of: December 2, 2020               Content Version: 13.0  © 9327-8058 [employer], Incorporated. Care instructions adapted under license by [facility] ([facility]). If you have questions about a medical condition or this instruction, always ask your healthcare professional. [place] [address] any warranty or liability for your use of this information.

## 2021-12-07 NOTE — PROGRESS NOTES
Chief Complaint   Patient presents with    Dizziness     Last few wks    Insomnia    Other     Prediabetes       History obtained from the patient. SUBJECTIVE:  Vin Delong is a 74 y.o. male that presents today for     -? Vertigo:  Started a few wks ago  Nothing at rest  Mild  Coming and going the last 2 wks  When turns head quickly, will get short period of veritgo, lasts seconds, and then goes away  Seems worse to the L  No HA's  No tinnitus  No hearing loss.   No sig change the last few wks  Some days are better than others  Occurs if rolls over in bed as well      -Insomnia:  Was having some insomnia  Tried on trazodone, did not tolerate and wasn't helping  Has since stopped it  Is doing ok at that moment  Is taking tylenol PM, which works well enough  Moods stable, no depression/anxiety      -PreDM/Obesity:  Noted on labs  wts the same  Working on lifestyle changes  Labs reviewed with pt today      Age/Gender Health Maintenance    Lipid -   Lab Results   Component Value Date    CHOL 166 05/26/2021    CHOL 174 08/17/2020    CHOL 165 08/12/2019     Lab Results   Component Value Date    TRIG 105 05/26/2021    TRIG 124 08/17/2020    TRIG 99 08/12/2019     Lab Results   Component Value Date    HDL 37 05/26/2021    HDL 36 08/17/2020    HDL 46 08/12/2019     Lab Results   Component Value Date    LDLCALC 108 05/26/2021    [address] 113 08/17/2020    [address] 99 08/12/2019       DM Screen -   Lab Results   Component Value Date    GLUCOSE 114 11/20/2021    GLUCOSE 214 06/21/2021     Lab Results   Component Value Date    LABA1C 6.3 11/20/2021    LABA1C 6.2 05/26/2021    LABA1C 6.0 05/21/2020    LABA1C 6.4 02/21/2020    LABA1C 6.1 08/12/2019    LABA1C 6.0 08/10/2018       113 (OCT 2015)/110 (JUNE 2016)/100 (DEC 2017)    With a1c 6.1 (SEPT 2017)    TSH - 4.090 (DEC 2017)    Lab Results   Component Value Date    TSH 2.600 05/26/2021       Colon Cancer Screening - NEG Cologuard June 2020  Lung Cancer Screening (Age 55 to 80 with 30 pack year hx, current smoker or quit within past 15 years) - never smoker    Tetanus - UTD July 2015  Influenza Vaccine - UTD FALL 2021  Pneumonia Vaccine - UTD PCV 13 in AUG 2019/PPV 23 in AUG 2020  Zoster - UTD shingrix x 2    PSA testing discussion - Follows with urology at [facility]    Lab Results   Component Value Date    PSA 6.53 (H) 10/07/2020    PSA 5.84 (H) 08/02/2019    PSA 5.3 08/10/2018     AAA Screening - never smoker       Current Outpatient Medications   Medication Sig Dispense Refill    losartan (COZAAR) 100 MG tablet TAKE 1 TABLET DAILY 90 tablet 3    amLODIPine (NORVASC) 10 MG tablet Take 1 tablet by mouth once daily 90 tablet 3    tamsulosin (FLOMAX) 0.4 MG capsule TAKE 1 CAPSULE BY MOUTH ONCE DAILY AT BEDTIME      lidocaine (XYLOCAINE) 5 % ointment Apply topically as needed. 50 g 1    Mirabegron ER (MYRBETRIQ) 50 MG TB24 Take 50 mg by mouth      hydrocortisone (PROCTOZONE-HC) 2.5 % rectal cream APPLY ONE CREAM RECTALLY TWICE DAILY FOR  THE  NEXT  FEW  DAYS 3 Tube 3    Multiple Vitamins-Minerals (MULTIVITAMIN PO) Take by mouth daily      nystatin (MYCOSTATIN) 092148 UNIT/GM cream Apply topically as needed Apply topically 2 times daily. As needed      VIAGRA 100 MG TABS        No current facility-administered medications for this visit. No orders of the defined types were placed in this encounter. All medications reviewed and reconciled, including OTC and herbal medications. Updated list given to patient. Patient Active Problem List   Diagnosis    Dermatitis    Hypertension    Erectile dysfunction    BPH with elevated PSA    Prediabetes    Vitamin D deficiency    Right hand pain    Carpal tunnel syndrome of right wrist    Obesity (BMI 30-39. 9)    Insomnia       Past Medical History:   Diagnosis Date    BPH with elevated PSA 7/17/2015    Follows with urology for this and elevated PSA    Dermatitis     follows with Dr. Que trevino Erectile dysfunction     Hypertension     Obesity (BMI 30-39. 9)     Prediabetes 7/29/2016       Past Surgical History:   Procedure Laterality Date    PROSTATE SURGERY  2009        PROSTATECTOMY N/A 07/01/2021    Robotic-assisted laparoscopic suprapubic prostatectomy @ [facility]         No Known Allergies      Social History     Tobacco Use    Smoking status: Never Smoker    Smokeless tobacco: Never Used   Substance Use Topics    Alcohol use: No       Family History   Problem Relation Age of Onset    High Blood Pressure Mother     Cancer Father         Lung Cancer    Colon Cancer Neg Hx     Prostate Cancer Neg Hx          I have reviewed the patient's past medical history, past surgical history, allergies, medications, social and family history and I have made updates where appropriate. Review of Systems  Positive responses are highlighted in bold    Constitutional:  Fever, Chills, Night Sweats, Fatigue, Unexpected changes in weight  HENT:  Ear pain, Tinnitus, Nosebleeds, Trouble swallowing, Hearing loss, Sore throat  Cardiovascular:  Chest Pain, Palpitations, Orthopnea, Paroxysmal Nocturnal Dyspnea  Respiratory:  Cough, Wheezing, Shortness of breath, Chest tightness, Apnea  Gastrointestinal:  Nausea, Vomiting, Diarrhea, Constipation, Heartburn, Blood in stool  Genitourinary:  Difficulty or painful urination, Flank pain, Change in frequency, Urgency  Skin:  Color change, Rash, Itching, Wound  Musculoskeletal:  Joint pain, Back pain, Gait problems, Joint swelling, Myalgias  Neurological:  Dizziness, Headaches, Presyncope, Numbness, Seizures, Tremors  Endocrine:  Heat Intolerance, Cold Intolerance, Polydipsia, Polyphagia, Polyuria      PHYSICAL EXAM:  Vitals:    12/07/21 1244   BP: 132/70   Pulse: 82   Resp: 12   Temp: 98.1 °F (36.7 °C)   TempSrc: Oral   SpO2: 98%   Weight: 227 lb 9.6 oz (103.2 kg)   Height: 5' 8.5\" (1.74 m)     Body mass index is 34.1 kg/m².   Pain Score:   0 - No pain    VS Reviewed  General Appearance: A&O x 3, No acute distress,well developed and well- nourished  Eyes: pupils equal, round, and reactive to light, extraocular eye movements intact, conjunctivae and eye lids without erythema  ENT: external ear and ear canal clear bilaterally, TMs intact and regular, nose without deformity, nasal mucosa and turbinates normal without polyps, oropharynx normal, dentition is normal for age  Neck: supple and non-tender without mass, no thyromegaly or thyroid nodules, no cervical lymphadenopathy  Pulmonary/Chest: clear to auscultation bilaterally- no wheezes, rales or rhonchi, normal air movement, no respiratory distress or retractions  Cardiovascular: S1 and S2 auscultated w/ RRR. No murmurs, rubs, clicks, or gallops, distal pulses intact. Abdomen: soft, non-tender, non-distended, bowel sounds physiologic,  no rebound or guarding, no masses or hernias noted. Liver and spleen without enlargement. Extremities: no cyanosis, clubbing or edema of the lower extremities. Skin: warm and dry, no rash or erythema  Neuro Exam:   Cranial [id number] Intact.    Cranial nerve II: Normal Visual Acuity   Cranial nerve III: Pupils: equal, round, reactive to light   Cranial nerves III, IV, VI: Extraocular Movements: intact   Cranial nerve V: Facial sensation: intact   Cranial nerve VII:Facial strength: intact   Cranial nerve VIII: Hearing: intact   Cranial nerve IX: Palate Elevation intact bilaterally  Cranial nerve XI: Shoulder shrug intact bilaterally  Cranial nerve XII: Tongue movement: normal     Muscle Strength Testing    Deltoid Right 5/5  Left 5/5  Biceps Right 5/5  Left 5/5  Triceps Right 5/5  Left 5/5  Wrist Extensors Right  5/5  Left 5/5   Flexor Digitorum Profundus Right 5/5  Left 5/5  Hand Intrinsics Right 5/5  Left 5/5  Hip Flexors Right 5/5  Left 5/5  Quadriceps Right 5/5  Left 5/5  Anterior Tibialis Right 5/5  Left 5/5  Extensor Hallucis Longus Right 5/5  Left 5/5  Gastrocnemius/Soleus Right 5/5  Left 5/5     Sensory Testing    C5 Right 0=Normal; no sensory loss Left 0=Normal; no sensory loss  C6 Right 0=Normal; no sensory loss Left 0=Normal; no sensory loss  C7 Right 0=Normal; no sensory loss Left 0=Normal; no sensory loss  C8 Right 0=Normal; no sensory loss Left 0=Normal; no sensory loss  T1 Right 0=Normal; no sensory loss Left 0=Normal; no sensory loss    L2 Right 0=Normal; no sensory loss Left 0=Normal; no sensory loss  L3 Right 0=Normal; no sensory loss Left 0=Normal; no sensory loss  L4 Right 0=Normal; no sensory loss Left 0=Normal; no sensory loss  L5 Right 0=Normal; no sensory loss Left 0=Normal; no sensory loss  S1 Right 0=Normal; no sensory loss Left 0=Normal; no sensory loss     Cerebellar Testing    Finger to Nose:  Right  WNL Yes;  Left WNL  Yes  Heel to Krishna WNL: Right  WNL  Yes;  Left WNL  Yes     Deep Tendon Reflexes 2/4 equal and symmetric throughout   Clonus:  No  Decreased arm swing No   Shuffling gait No  Narrow base of support No  Able to stand without using arms  Yes  Bradykinesia  No  Tremor No  Increased tone at wrist especially with opening/closing opposite hand  No  Prabha/hallpike: + to the L      Nurse Only on 11/20/2021   Component Date Value Ref Range Status    Hemoglobin A1C 11/20/2021 6.3  4.4 - 6.4 % Final    AVERAGE GLUCOSE 11/20/2021 129* 70 - 126 mg/dL Final    Glucose 11/20/2021 114* 70 - 108 mg/dL Final       ASSESSMENT & PLAN  1. BPPV (benign paroxysmal positional vertigo), left    Mild sxs at this point will start with HEP  If no better by end of the wk, call, and will get setup with vestibular rehab    2. Insomnia, unspecified type    Ok to stop and stay off trazodone d/t side effects  Ok to use Tylenol PM prn  Sleep hygiene reviewed    3. Prediabetes    Stable  con't diet/exercise changes  Labs in 6 months, in call back cue  Results reviewed with pt today    4. Obesity (BMI 30-39. 9)        DISPOSITION    Return if symptoms worsen or fail to improve. Jaclyn Dress released without restrictions. Future Appointments   Date Time Provider Ju Urbina   8/24/2022  9:00 AM [address]     PATIENT COUNSELING    Barriers to learning and self management: none    Discussed use, benefit, and side effects of prescribed medications. Barriers to medication compliance addressed. All patient questions answered. Pt voiced understanding.        Electronically signed by Quyen Wallace DO on 12/7/2021 at 1:03 PM

## 2021-12-17 ENCOUNTER — OFFICE VISIT (OUTPATIENT)
Dept: FAMILY MEDICINE CLINIC | Age: 66
End: 2021-12-17
Payer: MEDICARE

## 2021-12-17 VITALS
OXYGEN SATURATION: 97 % | RESPIRATION RATE: 12 BRPM | TEMPERATURE: 97.3 F | BODY MASS INDEX: 34.36 KG/M2 | DIASTOLIC BLOOD PRESSURE: 78 MMHG | HEART RATE: 87 BPM | WEIGHT: 232 LBS | SYSTOLIC BLOOD PRESSURE: 128 MMHG | HEIGHT: 69 IN

## 2021-12-17 DIAGNOSIS — R10.31 INGUINAL PAIN, RIGHT: Primary | ICD-10-CM

## 2021-12-17 PROCEDURE — G8427 DOCREV CUR MEDS BY ELIG CLIN: HCPCS | Performed by: FAMILY MEDICINE

## 2021-12-17 PROCEDURE — G8484 FLU IMMUNIZE NO ADMIN: HCPCS | Performed by: FAMILY MEDICINE

## 2021-12-17 PROCEDURE — 1123F ACP DISCUSS/DSCN MKR DOCD: CPT | Performed by: FAMILY MEDICINE

## 2021-12-17 PROCEDURE — 99213 OFFICE O/P EST LOW 20 MIN: CPT | Performed by: FAMILY MEDICINE

## 2021-12-17 PROCEDURE — 4040F PNEUMOC VAC/ADMIN/RCVD: CPT | Performed by: FAMILY MEDICINE

## 2021-12-17 PROCEDURE — 1036F TOBACCO NON-USER: CPT | Performed by: FAMILY MEDICINE

## 2021-12-17 PROCEDURE — 3017F COLORECTAL CA SCREEN DOC REV: CPT | Performed by: FAMILY MEDICINE

## 2021-12-17 PROCEDURE — G8417 CALC BMI ABV UP PARAM F/U: HCPCS | Performed by: FAMILY MEDICINE

## 2021-12-17 NOTE — PROGRESS NOTES
Chief Complaint   Patient presents with    Groin Pain     right thigh/groin on/off for a few months        History obtained from the patient.     SUBJECTIVE:  Sara Padilla is a 74 y.o. male that presents today for     -R thigh/groin pain:  Started before main  R groin pain  Comes and goes  Last episode was last night  Typically doesn't last long, but last night lasted longer, about 20 min  Resolved on its own  Is pretty painful when occurs  Happens once a wk  No obvious triggers    Age/Gender Health Maintenance    Lipid -   Lab Results   Component Value Date    CHOL 166 05/26/2021    CHOL 174 08/17/2020    CHOL 165 08/12/2019     Lab Results   Component Value Date    TRIG 105 05/26/2021    TRIG 124 08/17/2020    TRIG 99 08/12/2019     Lab Results   Component Value Date    HDL 37 05/26/2021    HDL 36 08/17/2020    HDL 46 08/12/2019     Lab Results   Component Value Date    LDLCALC 108 05/26/2021    LDLCALC 113 08/17/2020    [address] 99 08/12/2019       DM Screen -   Lab Results   Component Value Date    GLUCOSE 114 11/20/2021    GLUCOSE 214 06/21/2021     Lab Results   Component Value Date    LABA1C 6.3 11/20/2021    LABA1C 6.2 05/26/2021    LABA1C 6.0 05/21/2020    LABA1C 6.4 02/21/2020    LABA1C 6.1 08/12/2019    LABA1C 6.0 08/10/2018       113 (OCT 2015)/110 (JUNE 2016)/100 (DEC 2017)    With a1c 6.1 (SEPT 2017)    TSH - 4.090 (DEC 2017)    Lab Results   Component Value Date    TSH 2.600 05/26/2021       Colon Cancer Screening - NEG Cologuard June 2020  Lung Cancer Screening (Age 54 to [de-identified] with 30 pack year hx, current smoker or quit within past 15 years) - never smoker    Tetanus - UTD July 2015  Influenza Vaccine - UTD FALL 2021  Pneumonia Vaccine - UTD PCV 13 in AUG 2019/PPV 23 in AUG 2020  Zoster - UTD shingrix x 2    PSA testing discussion - Follows with urology at [facility]    Lab Results   Component Value Date    PSA 6.53 (H) 10/07/2020    PSA 5.84 (H) 08/02/2019    PSA 5.3 08/10/2018     AAA Screening - never smoker       Current Outpatient Medications   Medication Sig Dispense Refill    losartan (COZAAR) 100 MG tablet TAKE 1 TABLET DAILY 90 tablet 3    amLODIPine (NORVASC) 10 MG tablet Take 1 tablet by mouth once daily 90 tablet 3    tamsulosin (FLOMAX) 0.4 MG capsule TAKE 1 CAPSULE BY MOUTH ONCE DAILY AT BEDTIME      lidocaine (XYLOCAINE) 5 % ointment Apply topically as needed. 50 g 1    Mirabegron ER (MYRBETRIQ) 50 MG TB24 Take 50 mg by mouth      hydrocortisone (PROCTOZONE-HC) 2.5 % rectal cream APPLY ONE CREAM RECTALLY TWICE DAILY FOR  THE  NEXT  FEW  DAYS 3 Tube 3    Multiple Vitamins-Minerals (MULTIVITAMIN PO) Take by mouth daily      nystatin (MYCOSTATIN) 552862 UNIT/GM cream Apply topically as needed Apply topically 2 times daily. As needed      VIAGRA 100 MG TABS        No current facility-administered medications for this visit. No orders of the defined types were placed in this encounter. All medications reviewed and reconciled, including OTC and herbal medications. Updated list given to patient. Patient Active Problem List   Diagnosis    Dermatitis    Hypertension    Erectile dysfunction    BPH with elevated PSA    Prediabetes    Vitamin D deficiency    Right hand pain    Carpal tunnel syndrome of right wrist    Obesity (BMI 30-39. 9)    Insomnia       Past Medical History:   Diagnosis Date    BPH with elevated PSA 7/17/2015    Follows with urology for this and elevated PSA    Dermatitis     follows with Dr. Cristhian trevino Erectile dysfunction     Hypertension     Obesity (BMI 30-39. 9)     Prediabetes 7/29/2016       Past Surgical History:   Procedure Laterality Date    PROSTATE SURGERY  2009        PROSTATECTOMY N/A 07/01/2021    Robotic-assisted laparoscopic suprapubic prostatectomy @ [facility]         No Known Allergies      Social History     Tobacco Use    Smoking status: Never Smoker    Smokeless tobacco: Never Used   Substance Use Topics    Alcohol use: No       Family History   Problem Relation Age of Onset    High Blood Pressure Mother     Cancer Father         Lung Cancer    Colon Cancer Neg Hx     Prostate Cancer Neg Hx          I have reviewed the patient's past medical history, past surgical history, allergies, medications, social and family history and I have made updates where appropriate. Review of Systems  Positive responses are highlighted in bold    Constitutional:  Fever, Chills, Night Sweats, Fatigue, Unexpected changes in weight  HENT:  Ear pain, Tinnitus, Nosebleeds, Trouble swallowing, Hearing loss, Sore throat  Cardiovascular:  Chest Pain, Palpitations, Orthopnea, Paroxysmal Nocturnal Dyspnea  Respiratory:  Cough, Wheezing, Shortness of breath, Chest tightness, Apnea  Gastrointestinal:  Nausea, Vomiting, Diarrhea, Constipation, Heartburn, Blood in stool  Genitourinary:  Difficulty or painful urination, Flank pain, Change in frequency, Urgency  Skin:  Color change, Rash, Itching, Wound  Musculoskeletal:  Joint pain, Back pain, Gait problems, Joint swelling, Myalgias  Neurological:  Dizziness, Headaches, Presyncope, Numbness, Seizures, Tremors  Endocrine:  Heat Intolerance, Cold Intolerance, Polydipsia, Polyphagia, Polyuria      PHYSICAL EXAM:  Vitals:    12/17/21 1211   BP: 128/78   Pulse: 87   Resp: 12   Temp: 97.3 °F (36.3 °C)   TempSrc: Oral   SpO2: 97%   Weight: 232 lb (105.2 kg)   Height: 5' 8.5\" (1.74 m)     Body mass index is 34.76 kg/m².   Pain Score:   0 - No pain    VS Reviewed  General Appearance: A&O x 3, No acute distress,well developed and well- nourished  Eyes: pupils equal, round, and reactive to light, extraocular eye movements intact, conjunctivae and eye lids without erythema  ENT: external ear and ear canal clear bilaterally, TMs intact and regular, nose without deformity, nasal mucosa and turbinates normal without polyps, oropharynx normal, dentition is normal for age  Neck: supple and non-tender without mass, no thyromegaly or thyroid nodules, no cervical lymphadenopathy  Pulmonary/Chest: clear to auscultation bilaterally- no wheezes, rales or rhonchi, normal air movement, no respiratory distress or retractions  Cardiovascular: S1 and S2 auscultated w/ RRR. No murmurs, rubs, clicks, or gallops, distal pulses intact. Abdomen: soft, non-tender, non-distended, bowel sounds physiologic,  no rebound or guarding, no masses or hernias noted. Liver and spleen without enlargement. Extremities: no cyanosis, clubbing or edema of the lower extremities. Skin: warm and dry, no rash or erythema  : no scrotal mass or obvious bulge or hernia. No TTP in hip joint. Narrative   Ordering Provider: Jeremías Ryder          Radiology Department  Patient: Lili Munoz.   :  1955   Sex: [address] [place] [address], [address] [place] [address]  LocationCarloyn Kidney     [phone]   Unit #:   [id number]       Acct #: [de-identified]       Ordering Phys: Santana Peter PA-C            Exam Date: 21     Accession #:  [id number]     Exam:  US   US Scrotum Testicles [id number]     Result: See Report            STUDY:   SCROTUM ULTRASOUND          REASON FOR EXAM:   Male,  70 years old. Sierraville Gupta   TECHNIQUE:   Ultrasound evaluation of the scrotum was performed with color     Doppler and static gray-scale imaging.          COMPARISON:   None.     ___________________________________          FINDINGS:          RIGHT TESTICLE          INTRATESTICULAR: Franky Villarealig is a normal size of the right testicle.  The right     testicle measures 2.1 x 3.2 x 4.3 cm.  There is a homogenous echotexture.     There is normal arterial and normal venous vascularity.  There is no     demonstrated right testicular mass or cyst.          EXTRATESTICULAR:  The epididymis is normal in size.  The epididymis head     measures 0.94 cm.  There is normal vascularity of the epididymis.  There     is no demonstrated epididymal cystic structure. Adri Many is no demonstrated     hydrocele. Adri Many is no demonstrated varicocele. Adri Many is no demonstrated     extratesticular mass or cyst.          LEFT TESTICLE          Left testicle is not visualized.          EXTRATESTICULAR:  There is no demonstrated hydrocele.  There is no     demonstrated varicocele.  There is no demonstrated extratesticular mass or     cyst.     ___________________________________          IMPRESSION:     1.  Normal right testicle.     2.  Absent or atrophic left testicle.          Electronically Signed:     Nikolas Bucio MD     2021/05/12 at 14:06 EDT     Tel [phone], Service support  [phone], Fax [phone]                         cc: Sobia KWOK; Dianne Lindquist DO               Dictated by: Jeniffer Bucio MD on 05/12/21 1406     Technologist:   DANIELLA ROBERT Bowman     Transcribed by: Marcus Ribeiro on 05/12/21 1406          Report Signed by: BEATRICE Lovell MD on 05/12/21 1406       ASSESSMENT & PLAN  1. Inguinal pain, right    ? Etiology  Neg exam today  Early hernia? Will get US  Xray  Avoid heavy lifting  Reviewed ER precautions, pt understands. - US EXTREMITY RIGHT NON VASC LIMITED; Future  - XR HIP RIGHT (2-3 VIEWS); Future      DISPOSITION    Return if symptoms worsen or fail to improve. Reyes Krebs released without restrictions. Future Appointments   Date Time Provider Ju Urbina   8/24/2022  9:00 AM [address]     PATIENT COUNSELING    Barriers to learning and self management: none    Discussed use, benefit, and side effects of prescribed medications. Barriers to medication compliance addressed. All patient questions answered. Pt voiced understanding.        Electronically signed by Dianne Lnidquist DO on 12/17/2021 at 3:50 PM

## 2021-12-20 ENCOUNTER — HOSPITAL ENCOUNTER (OUTPATIENT)
Age: 66
Discharge: HOME | End: 2021-12-20
Payer: MEDICARE

## 2021-12-20 ENCOUNTER — HOSPITAL ENCOUNTER (OUTPATIENT)
Dept: GENERAL RADIOLOGY | Age: 66
Discharge: HOME | End: 2021-12-20
Payer: MEDICARE

## 2021-12-20 ENCOUNTER — TELEPHONE (OUTPATIENT)
Dept: FAMILY MEDICINE CLINIC | Age: 66
End: 2021-12-20

## 2021-12-20 DIAGNOSIS — R10.31 INGUINAL PAIN, RIGHT: ICD-10-CM

## 2021-12-20 PROCEDURE — 73502 X-RAY EXAM HIP UNI 2-3 VIEWS: CPT

## 2021-12-20 NOTE — TELEPHONE ENCOUNTER
----- Message from Parish Ratliff DO sent at 12/20/2021 10:16 AM EST -----  Please let pt know that hpi xray is normal  Will await US results  Let me know if questions, thanks!

## 2021-12-21 ENCOUNTER — TELEPHONE (OUTPATIENT)
Dept: FAMILY MEDICINE CLINIC | Age: 66
End: 2021-12-21

## 2021-12-21 ENCOUNTER — HOSPITAL ENCOUNTER (OUTPATIENT)
Dept: ULTRASOUND IMAGING | Age: 66
Discharge: HOME | End: 2021-12-21
Payer: MEDICARE

## 2021-12-21 DIAGNOSIS — K40.90 RIGHT INGUINAL HERNIA: Primary | ICD-10-CM

## 2021-12-21 DIAGNOSIS — R10.31 INGUINAL PAIN, RIGHT: ICD-10-CM

## 2021-12-21 PROCEDURE — 76882 US LMTD JT/FCL EVL NVASC XTR: CPT

## 2021-12-21 NOTE — TELEPHONE ENCOUNTER
Pt notified , pt does not have a preference . Pt  shayan wait to hear from surgeons office to get set up for an appointment .

## 2021-12-21 NOTE — TELEPHONE ENCOUNTER
Diagnosis Orders   1. Right inguinal hernia  [address], Marilee Rivero DO, General Surgery, [facility].VIERTEL   2.  Inguinal pain, right  [address], , General Surgery, [facility].RAMONERTEL

## 2021-12-21 NOTE — TELEPHONE ENCOUNTER
----- Message from Harry Villalba DO sent at 12/21/2021  3:13 PM EST -----  Please let pt know that US shows possible hernia in the area of concern. Would recommend we get him set up with a general surgeon. Does he have a preference? Let me know, thanks!

## 2022-01-07 ENCOUNTER — TELEPHONE (OUTPATIENT)
Dept: SURGERY | Age: 67
End: 2022-01-07

## 2022-01-07 ENCOUNTER — OFFICE VISIT (OUTPATIENT)
Dept: SURGERY | Age: 67
End: 2022-01-07
Payer: MEDICARE

## 2022-01-07 VITALS
OXYGEN SATURATION: 97 % | RESPIRATION RATE: 18 BRPM | SYSTOLIC BLOOD PRESSURE: 138 MMHG | BODY MASS INDEX: 34.58 KG/M2 | HEIGHT: 69 IN | DIASTOLIC BLOOD PRESSURE: 70 MMHG | WEIGHT: 233.5 LBS | TEMPERATURE: 97.9 F | HEART RATE: 95 BPM

## 2022-01-07 DIAGNOSIS — Z01.818 PRE-OP TESTING: ICD-10-CM

## 2022-01-07 DIAGNOSIS — K40.90 INGUINAL HERNIA, RIGHT: Primary | ICD-10-CM

## 2022-01-07 PROCEDURE — 99205 OFFICE O/P NEW HI 60 MIN: CPT | Performed by: SURGERY

## 2022-01-07 PROCEDURE — G8427 DOCREV CUR MEDS BY ELIG CLIN: HCPCS | Performed by: SURGERY

## 2022-01-07 PROCEDURE — G8417 CALC BMI ABV UP PARAM F/U: HCPCS | Performed by: SURGERY

## 2022-01-07 PROCEDURE — G8484 FLU IMMUNIZE NO ADMIN: HCPCS | Performed by: SURGERY

## 2022-01-07 RX ORDER — ASCORBIC ACID 500 MG
500 TABLET ORAL DAILY
COMMUNITY

## 2022-01-07 ASSESSMENT — ENCOUNTER SYMPTOMS
COUGH: 0
CHEST TIGHTNESS: 0
EYE ITCHING: 0
SINUS PRESSURE: 0
EYE REDNESS: 0
APNEA: 0
BLOOD IN STOOL: 0
VOMITING: 0
CHOKING: 0
BACK PAIN: 0
ANAL BLEEDING: 0
WHEEZING: 0
RHINORRHEA: 0
RECTAL PAIN: 0
SINUS PAIN: 0
COLOR CHANGE: 0
ABDOMINAL DISTENTION: 0
CONSTIPATION: 0
PHOTOPHOBIA: 0
SORE THROAT: 0
VOICE CHANGE: 0
TROUBLE SWALLOWING: 0
SHORTNESS OF BREATH: 0
EYE DISCHARGE: 0
DIARRHEA: 0
EYE PAIN: 0
FACIAL SWELLING: 0
STRIDOR: 0
ABDOMINAL PAIN: 0
NAUSEA: 0

## 2022-01-07 NOTE — TELEPHONE ENCOUNTER
Patient scheduled for surgery with Dr. Nancy Gutierrez on 02- at 9:30am with an arrival of 8:00am.  Preop orders and instructions given to patient. Surgery consent to be signed on arrival.  Antibacterial soap given.

## 2022-01-07 NOTE — LETTER
Pavel Yi,   [address] [facility]. [address]  [place] [address]  [phone]     Pt Name: Beverly Wang Record Number: [id number]  Date of Birth 1955   Today's Date: 1/7/2022    Bull Blake was seen in consultation in the office today. My assessment and plans are listed below. ASSESSMENT      Diagnosis Orders   1. Inguinal hernia, right     2. Pre-op testing       Past Medical History:   Diagnosis Date    BPH with elevated PSA 7/17/2015    Follows with urology for this and elevated PSA    Dermatitis     follows with uriel, Dr. Mala Markham Erectile dysfunction     Hypertension     Obesity (BMI 30-39. 9)     Prediabetes 7/29/2016         PLANS:   1. Schedule [facility] for right inguinal hernia repair with mesh  2. I had a discussion with the patient regarding potential therapies for hernias and the risks of hernia surgery to include bleeding, infection, recurrence, injury to surrounding structures and organs, and continued pain in the area. We also discussed the use of mesh and its advantages and disadvantages. We discussed the anesthetic options, conduct of the operation, outpatient status, post op recovery and length of time off of work. After this discussion, the patient's questions were answered and has elected to proceed with surgical repair. 3. Status: outpatient  4. Planned anesthesia: general  5. He will undergo pre-operative clearance per anesthesia guidelines with risk factors listed under the past medical history diagnosis & problem list.    If I can provide any additional assistance or you have any concerns, please feel free to contact me. Thank you for allowing to participate in the care of your patients. Sincerely,      Sabiha Gil.  Arya Zimmerman

## 2022-01-07 NOTE — PROGRESS NOTES
Tierra Blake. [facility], [address] [place] [address]  [phone]  New Patient Evaluation in Office    Pt Name: Kerwin Stanton  Date of Birth 1955   Today's Date: 1/7/2022  Medical Record Number: [id number]  Referring Provider: Jenni Mcgarry DO  Primary Care Provider: Desiree Carvajal DO  Chief Complaint   Patient presents with   [facility] Consult     New patient-referred by Dr Steve Gerardo inguinal hernia     ASSESSMENT       Diagnosis Orders   1. Inguinal hernia, right     2. Pre-op testing       Past Medical History:   Diagnosis Date    BPH with elevated PSA 7/17/2015    Follows with urology for this and elevated PSA    Dermatitis     follows with Dr. Ortega trevino Erectile dysfunction     Hypertension     Obesity (BMI 30-39. 9)     Prediabetes 7/29/2016          PLANS      1. Schedule [facility] for right inguinal hernia repair with mesh  2. I had a discussion with the patient regarding potential therapies for hernias and the risks of hernia surgery to include bleeding, infection, recurrence, injury to surrounding structures and organs, and continued pain in the area. We also discussed the use of mesh and its advantages and disadvantages. We discussed the anesthetic options, conduct of the operation, outpatient status, post op recovery and length of time off of work. After this discussion, the patient's questions were answered and has elected to proceed with surgical repair. 3. Status: outpatient  4. Planned anesthesia: general  5. He will undergo pre-operative clearance per anesthesia guidelines with risk factors listed under the past medical history diagnosis & problem list.     Nikolas Tejada is a 74 y.o. male seen in the consultation for evaluation of a right inguinal hernia. Symptoms were first noted a few months ago and has gradually worsened since that time.  The pain is dull, intermittent, moderate in severity. It is aggravated by Valsalva maneuvers and palliated by rest. He has no additional symptoms. He denies signs or symptoms of incarceration or strangulation. He has not had prior right inguinal hernia surgery. This hernia is not stated to be work related per patient. Past Medical History  Past Medical History:   Diagnosis Date    BPH with elevated PSA 7/17/2015    Follows with urology for this and elevated PSA    Dermatitis     follows with Dr. Kilo trevino Erectile dysfunction     Hypertension     Obesity (BMI 30-39. 9)     Prediabetes 7/29/2016     Past Surgical History  Past Surgical History:   Procedure Laterality Date    COLONOSCOPY  2007    Dr. Rene Oliva  2005    Dr Juan Alberto Suggs ablation of prostate    PROSTATECTOMY N/A 07/01/2021    Robotic-assisted laparoscopic suprapubic prostatectomy @ [facility]     Medications  Current Outpatient Medications   Medication Sig Dispense Refill    vitamin D (CHOLECALCIFEROL) 25 MCG (1000 UT) TABS tablet Take 1,000 Units by mouth daily      ascorbic acid (VITAMIN C) 500 MG tablet Take 500 mg by mouth daily      zinc 50 MG CAPS Take by mouth      OLIVE LEAF EXTRACT PO Take 1 tablet by mouth daily      losartan (COZAAR) 100 MG tablet TAKE 1 TABLET DAILY 90 tablet 3    amLODIPine (NORVASC) 10 MG tablet Take 1 tablet by mouth once daily 90 tablet 3    lidocaine (XYLOCAINE) 5 % ointment Apply topically as needed. 50 g 1    hydrocortisone (PROCTOZONE-HC) 2.5 % rectal cream APPLY ONE CREAM RECTALLY TWICE DAILY FOR  THE  NEXT  FEW  DAYS 3 Tube 3    Multiple Vitamins-Minerals (MULTIVITAMIN PO) Take by mouth daily      nystatin (MYCOSTATIN) 236877 UNIT/GM cream Apply topically as needed Apply topically 2 times daily. As needed      VIAGRA 100 MG TABS        No current facility-administered medications for this visit. Allergies  has No Known Allergies.   Family History  family history includes Cancer in his father; High Blood Pressure in his mother. Social History   reports that he has never smoked. He has never used smokeless tobacco. He reports that he does not drink alcohol and does not use drugs. Health Screening Exams  Health Maintenance   Topic Date Due    PSA counseling  10/07/2021    Lipid screen  05/26/2022    Depression Screen  05/26/2022    Annual Wellness Visit (AWV)  05/27/2022    Potassium monitoring  07/01/2022    Creatinine monitoring  07/01/2022    A1C test (Diabetic or Prediabetic)  11/20/2022    Colon cancer screen fecal DNA test (Cologuard)  06/17/2023    DTaP/Tdap/Td vaccine (2 - Td or Tdap) 07/17/2025    Flu vaccine  Completed    Shingles Vaccine  Completed    Pneumococcal 65+ years Vaccine  Completed    COVID-19 Vaccine  Completed    Hepatitis C screen  Completed    Hepatitis A vaccine  Aged Out    Hepatitis B vaccine  Aged Out    Hib vaccine  Aged Out    Meningococcal (ACWY) vaccine  Aged Out     Review of Systems  Constitutional: Negative for activity change, appetite change, chills, diaphoresis, fatigue, fever and unexpected weight change. HENT: Negative for congestion, dental problem, drooling, ear discharge, ear pain, facial swelling, hearing loss, mouth sores, nosebleeds, postnasal drip, rhinorrhea, sinus pressure, sinus pain, sneezing, sore throat, tinnitus, trouble swallowing and voice change. Eyes: Negative for photophobia, pain, discharge, redness, itching and visual disturbance. Respiratory: Negative for apnea, cough, choking, chest tightness, shortness of breath, wheezing and stridor. Cardiovascular: Negative for chest pain, palpitations and leg swelling. Gastrointestinal: Negative for abdominal distention, abdominal pain, anal bleeding, blood in stool, constipation, diarrhea, nausea, rectal pain and vomiting. Endocrine: Negative for cold intolerance, heat intolerance, polydipsia, polyphagia and polyuria.    Genitourinary: Negative for decreased urine volume, difficulty urinating, dysuria, enuresis, flank pain, frequency, genital sores, hematuria, penile discharge, penile pain, penile swelling, scrotal swelling, testicular pain and urgency. Musculoskeletal: Negative for arthralgias, back pain, gait problem, joint swelling, myalgias, neck pain and neck stiffness. Skin: Negative for color change, pallor, rash and wound. Allergic/Immunologic: Negative for environmental allergies, food allergies and immunocompromised state. Neurological: Negative for dizziness, tremors, seizures, syncope, facial asymmetry, speech difficulty, weakness, light-headedness, numbness and headaches. Hematological: Negative for adenopathy. Does not bruise/bleed easily. Psychiatric/Behavioral: Negative for agitation, behavioral problems, confusion, decreased concentration, dysphoric mood, hallucinations, self-injury, sleep disturbance and suicidal ideas. The patient is not nervous/anxious and is not hyperactive. OBJECTIVE    VITALS:  height is 5' 8.5\" (1.74 m) and weight is 233 lb 8 oz (105.9 kg). His temporal temperature is 97.9 °F (36.6 °C). His blood pressure is 138/70 and his pulse is 95. His respiration is 18 and oxygen saturation is 97%. Pain Score:   0 - No pain Body mass index is 34.99 kg/m². CONSTITUTIONAL: Alert and oriented times 3, no acute distress and cooperative to examination with proper mood and affect. SKIN: Skin color, texture, turgor normal. No rashes or lesions. LYMPH: no cervical nodes, no inguinal nodes  HEENT: Head is normocephalic, atraumatic. EOMI, PERRLA. NECK: Supple, symmetrical, trachea midline, no adenopathy, thyroid symmetric, not enlarged and no tenderness, skin normal.  CHEST/LUNGS: chest symmetric with normal A/P diameter, normal respiratory rate and rhythm, lungs clear to auscultation without wheezes, rales or rhonchi. No accessory muscle use. Scars None   CARDIOVASCULAR: Heart sounds are normal.  Regular rate and rhythm without murmur, gallop or rub. Normal S1 and S2. Carotid and femoral pulses 2+/4 and equal bilaterally. ABDOMEN: obese robotic scar(s) present. Normal bowel sounds. No bruits. soft, nontender, nondistended, no masses or organomegaly. right inguinal hernia present which is reducible. Percussion: Normal without hepatosplenomegally. RECTAL: deferred, not clinically indicated  NEUROLOGIC: There are no focalizing motor or sensory deficits. CN II-XII are grossly intact. Juanell Leyland EXTREMITIES: no cyanosis, no clubbing and no edema. Thank you for the interesting evaluation. Further recommendations as listed above.        Electronically signed by Norma Bhat DO on 1/7/2022 at 11:08 AM

## 2022-01-07 NOTE — TELEPHONE ENCOUNTER
[address] [facility] [address]    Phone * [phone]   Surgical Scheduling Direct line Phone * [phone]  Fax * [phone] [facility]      1955    male    Sharmin [address] [address]  Marital Status:         Home Phone: [phone]   Cell Phone:   Telephone Information:   Mobile [phone]              Surgeon: Dr. Bony Matamoros  Surgery Date:02-   Time: 9:30am     Procedure: Right inguinal hernia repair with mesh Outpatient     Diagnosis: right inguinal hernia    Important Medical History: In Epic    Special Inst/Equip:     CPT Codes: 66064    Latex Allergy:   no Cardiac Device:  no    Anesthesia Type: General    Case Location:  Main OR     Preadmission Testing: Phone Call      PAT Date and Time: ________________________________    PAT Confirmation #: _________________________________    Post Op Visit:  ______________________________________    Need Preop Cardiac Clearance:   no    Does patient have Cardiologist/physician?  No      Surgery Conformation #:  ______________________________________________    : __________________________________ Date:____________________        [employer] Name:  Nahomi Cassidy

## 2022-01-07 NOTE — PROGRESS NOTES
Subjective:      Patient ID: Jose De Jesus Lombardo is a 74 y.o. male. HPI  Chief Complaint   Patient presents with    Surgical Consult     New patient-referred by Dr Gerardo Mccarthy inguinal hernia       Review of Systems   Constitutional: Negative for activity change, appetite change, chills, diaphoresis, fatigue, fever and unexpected weight change. HENT: Negative for congestion, dental problem, drooling, ear discharge, ear pain, facial swelling, hearing loss, mouth sores, nosebleeds, postnasal drip, rhinorrhea, sinus pressure, sinus pain, sneezing, sore throat, tinnitus, trouble swallowing and voice change. Eyes: Negative for photophobia, pain, discharge, redness, itching and visual disturbance. Respiratory: Negative for apnea, cough, choking, chest tightness, shortness of breath, wheezing and stridor. Cardiovascular: Negative for chest pain, palpitations and leg swelling. Gastrointestinal: Negative for abdominal distention, abdominal pain, anal bleeding, blood in stool, constipation, diarrhea, nausea, rectal pain and vomiting. Endocrine: Negative for cold intolerance, heat intolerance, polydipsia, polyphagia and polyuria. Genitourinary: Negative for decreased urine volume, difficulty urinating, dysuria, enuresis, flank pain, frequency, genital sores, hematuria, penile discharge, penile pain, penile swelling, scrotal swelling, testicular pain and urgency. Musculoskeletal: Negative for arthralgias, back pain, gait problem, joint swelling, myalgias, neck pain and neck stiffness. Skin: Negative for color change, pallor, rash and wound. Allergic/Immunologic: Negative for environmental allergies, food allergies and immunocompromised state. Neurological: Negative for dizziness, tremors, seizures, syncope, facial asymmetry, speech difficulty, weakness, light-headedness, numbness and headaches. Hematological: Negative for adenopathy. Does not bruise/bleed easily.    Psychiatric/Behavioral: Negative for agitation, behavioral problems, confusion, decreased concentration, dysphoric mood, hallucinations, self-injury, sleep disturbance and suicidal ideas. The patient is not nervous/anxious and is not hyperactive.       /70 (Site: Left Upper Arm, Position: Sitting, Cuff Size: Medium Adult)   Pulse 95   Temp 97.9 °F (36.6 °C) (Temporal)   Resp 18   Ht 5' 8.5\" (1.74 m)   Wt 233 lb 8 oz (105.9 kg)   SpO2 97%   BMI 34.99 kg/m²     Objective:   Physical Exam    Assessment:            Plan:              Ashley Murcia RN

## 2022-01-10 ENCOUNTER — TELEPHONE (OUTPATIENT)
Dept: SURGERY | Age: 67
End: 2022-01-10

## 2022-01-10 NOTE — TELEPHONE ENCOUNTER
Patient had an EKG done through [facility] in . Called [facility] medical records, spoke to Melly Hewitt to have tracing faxed to our office.

## 2022-02-05 ENCOUNTER — NURSE ONLY (OUTPATIENT)
Dept: LAB | Age: 67
End: 2022-02-05

## 2022-02-05 DIAGNOSIS — Z01.818 PRE-OP TESTING: ICD-10-CM

## 2022-02-05 DIAGNOSIS — K40.90 INGUINAL HERNIA, RIGHT: ICD-10-CM

## 2022-02-05 LAB
HCT VFR BLD CALC: 44.8 % (ref 42–52)
HEMOGLOBIN: 15.8 GM/DL (ref 14–18)

## 2022-02-13 ENCOUNTER — HOSPITAL ENCOUNTER (EMERGENCY)
Age: 67
Discharge: HOME | End: 2022-02-13
Attending: EMERGENCY MEDICINE
Payer: MEDICARE

## 2022-02-13 VITALS
BODY MASS INDEX: 32.21 KG/M2 | RESPIRATION RATE: 18 BRPM | HEIGHT: 70 IN | HEART RATE: 80 BPM | OXYGEN SATURATION: 96 % | DIASTOLIC BLOOD PRESSURE: 85 MMHG | SYSTOLIC BLOOD PRESSURE: 141 MMHG | TEMPERATURE: 98.1 F | WEIGHT: 225 LBS

## 2022-02-13 DIAGNOSIS — R10.31 RIGHT INGUINAL PAIN: Primary | ICD-10-CM

## 2022-02-13 PROCEDURE — 6360000002 HC RX W HCPCS: Performed by: EMERGENCY MEDICINE

## 2022-02-13 PROCEDURE — 6370000000 HC RX 637 (ALT 250 FOR IP): Performed by: EMERGENCY MEDICINE

## 2022-02-13 PROCEDURE — 96372 THER/PROPH/DIAG INJ SC/IM: CPT

## 2022-02-13 PROCEDURE — 99282 EMERGENCY DEPT VISIT SF MDM: CPT

## 2022-02-13 RX ORDER — KETOROLAC TROMETHAMINE 30 MG/ML
15 INJECTION, SOLUTION INTRAMUSCULAR; INTRAVENOUS ONCE
Status: COMPLETED | OUTPATIENT
Start: 2022-02-13 | End: 2022-02-13

## 2022-02-13 RX ORDER — OXYCODONE HYDROCHLORIDE AND ACETAMINOPHEN 5; 325 MG/1; MG/1
1 TABLET ORAL EVERY 6 HOURS PRN
Qty: 12 TABLET | Refills: 0 | Status: SHIPPED | OUTPATIENT
Start: 2022-02-13 | End: 2022-02-16

## 2022-02-13 RX ORDER — OXYCODONE HYDROCHLORIDE AND ACETAMINOPHEN 5; 325 MG/1; MG/1
1 TABLET ORAL ONCE
Status: COMPLETED | OUTPATIENT
Start: 2022-02-13 | End: 2022-02-13

## 2022-02-13 RX ADMIN — KETOROLAC TROMETHAMINE 15 MG: 30 INJECTION, SOLUTION INTRAMUSCULAR; INTRAVENOUS at 11:14

## 2022-02-13 RX ADMIN — OXYCODONE AND ACETAMINOPHEN 1 TABLET: 5; 325 TABLET ORAL at 11:14

## 2022-02-13 ASSESSMENT — ENCOUNTER SYMPTOMS
EYE REDNESS: 0
ABDOMINAL PAIN: 0
NAUSEA: 0
ABDOMINAL DISTENTION: 0
SINUS PRESSURE: 0
BACK PAIN: 0
PHOTOPHOBIA: 0
CHEST TIGHTNESS: 0
SORE THROAT: 0
VOMITING: 0
COLOR CHANGE: 0
COUGH: 0

## 2022-02-13 ASSESSMENT — PAIN DESCRIPTION - ORIENTATION: ORIENTATION: RIGHT

## 2022-02-13 ASSESSMENT — PAIN SCALES - GENERAL: PAINLEVEL_OUTOF10: 3

## 2022-02-13 ASSESSMENT — PAIN DESCRIPTION - LOCATION: LOCATION: GROIN

## 2022-02-13 ASSESSMENT — PAIN DESCRIPTION - PAIN TYPE: TYPE: ACUTE PAIN

## 2022-02-13 NOTE — ED PROVIDER NOTES
[place] ENCOUNTER          Pt Name: Estee Chong  MRN: [id number]  [place] 1955  Date of evaluation: 2/13/2022  Emergency Physician: Aric Hi, [facility]       Chief Complaint   Patient presents with    Groin Pain     RIGHT     History obtained from the patient. HISTORY OF PRESENT ILLNESS    HPI  Estee Chong is a 74 y.o. male who presents to the emergency department for evaluation of right groin pain. Patient states he has a ho hernia in hisright inguinal hernia. Scheduled to have surgery with Dr. Demi Rosales this week. Reports he moved appliances yesterday and started having worsening right groin pain. Pain is worse with cough lifting and bearing down. Located in area of inguinal hernia. No masses or redness. No abdominal pain. No abdominal distension. The patient has no other acute complaints at this time. REVIEW OF SYSTEMS   Review of Systems   Constitutional: Negative for activity change, chills and fever. HENT: Negative for congestion, sinus pressure and sore throat. Eyes: Negative for photophobia, redness and visual disturbance. Respiratory: Negative for cough and chest tightness. Cardiovascular: Negative for chest pain, palpitations and leg swelling. Gastrointestinal: Negative for abdominal distention, abdominal pain, nausea and vomiting. Endocrine: Negative for polydipsia and polyuria. Genitourinary: Negative for decreased urine volume, difficulty urinating, dysuria, flank pain, frequency and urgency. Musculoskeletal: Negative for back pain, myalgias and neck pain. Skin: Negative for color change and rash. Neurological: Negative for weakness and headaches. Hematological: Negative for adenopathy. Does not bruise/bleed easily. Psychiatric/Behavioral: Negative for confusion and self-injury.          PAST MEDICAL AND SURGICAL HISTORY     Past Medical History:   Diagnosis Date    BPH with elevated PSA 7/17/2015    Follows with urology for this and elevated PSA    Dermatitis     follows with derm, Dr. Kira Anderson Erectile dysfunction     Hypertension     Obesity (BMI 30-39. 9)     Prediabetes 7/29/2016     Past Surgical History:   Procedure Laterality Date    COLONOSCOPY  2007    Dr. Nathalie Grace  2005    Dr Connie Coyne ablation of prostate    PROSTATECTOMY N/A 07/01/2021    Robotic-assisted laparoscopic suprapubic prostatectomy @ [facility]         MEDICATIONS   No current facility-administered medications for this encounter. Current Outpatient Medications:     vitamin D (CHOLECALCIFEROL) 25 MCG (1000 UT) TABS tablet, Take 1,000 Units by mouth daily, Disp: , Rfl:     ascorbic acid (VITAMIN C) 500 MG tablet, Take 500 mg by mouth daily, Disp: , Rfl:     zinc 50 MG CAPS, Take by mouth, Disp: , Rfl:     OLIVE LEAF EXTRACT PO, Take 1 tablet by mouth daily, Disp: , Rfl:     losartan (COZAAR) 100 MG tablet, TAKE 1 TABLET DAILY, Disp: 90 tablet, Rfl: 3    amLODIPine (NORVASC) 10 MG tablet, Take 1 tablet by mouth once daily, Disp: 90 tablet, Rfl: 3    lidocaine (XYLOCAINE) 5 % ointment, Apply topically as needed. , Disp: 50 g, Rfl: 1    hydrocortisone (PROCTOZONE-HC) 2.5 % rectal cream, APPLY ONE CREAM RECTALLY TWICE DAILY FOR  THE  NEXT  FEW  DAYS, Disp: 3 Tube, Rfl: 3    Multiple Vitamins-Minerals (MULTIVITAMIN PO), Take by mouth daily, Disp: , Rfl:     nystatin (MYCOSTATIN) 350523 UNIT/GM cream, Apply topically as needed Apply topically 2 times daily.  As needed, Disp: , Rfl:     VIAGRA 100 MG TABS, , Disp: , Rfl:       SOCIAL HISTORY     Social History     Social History Narrative    Not on file     Social History     Tobacco Use    Smoking status: Never Smoker    Smokeless tobacco: Never Used   Substance Use Topics    Alcohol use: No    Drug use: No         ALLERGIES   No Known Allergies      FAMILY HISTORY     Family History   Problem Relation Age of Onset    High Blood Pressure Mother     Cancer Father         Lung Cancer    Colon Cancer Neg Hx     Prostate Cancer Neg Hx          PHYSICAL EXAM     ED Triage Vitals [02/13/22 0949]   BP Temp Temp Source Pulse Resp SpO2 Height Weight   (!) 143/78 98.1 °F (36.7 °C) Oral 83 18 96 % 5' 10\" (1.778 m) 225 lb (102.1 kg)         Additional Vital Signs:  Vitals:    02/13/22 0949   BP: (!) 143/78   Pulse: 83   Resp: 18   Temp: 98.1 °F (36.7 °C)   SpO2: 96%       Physical Exam  Vitals and nursing note reviewed. Exam conducted with a chaperone present (Felicitas Karie). Constitutional:       General: He is not in acute distress. Appearance: He is well-developed. He is not diaphoretic. HENT:      Head: Normocephalic. Eyes:      Pupils: Pupils are equal, round, and reactive to light. Neck:      Vascular: No JVD. Cardiovascular:      Rate and Rhythm: Normal rate and regular rhythm. Heart sounds: Normal heart sounds. Pulmonary:      Effort: Pulmonary effort is normal.      Breath sounds: Normal breath sounds. Abdominal:      General: Bowel sounds are normal. There is no distension. Palpations: Abdomen is soft. Tenderness: There is no abdominal tenderness. Hernia: A hernia is present. Hernia is present in the right inguinal area (reducible. no redness). There is no hernia in the left inguinal area. Genitourinary:     Penis: Normal.       Testes: Normal.         Right: Tenderness or swelling not present. Left: Tenderness or swelling not present. Musculoskeletal:         General: No tenderness or deformity. Normal range of motion. Cervical back: Normal range of motion and neck supple. Skin:     General: Skin is warm and dry. Capillary Refill: Capillary refill takes less than 2 seconds. Neurological:      Mental Status: He is alert and oriented to person, place, and time. Comments: Non-focal. Moves all extremities.           Initial vital signs and nursing assessment reviewed and normal.   Pulsoximetry is normal per my interpretation. MEDICAL DECISION MAKING   Initial Assessment: Given the patient's above chief complaint and findings on history and physical examination, I thought it was appropriate to consider the following emergency medical conditions:Inguinal hernia, SBO, muscle strain, AAA, UTI, kidney stone diverticultis . Although some of these diagnoses are unlikely they were considered in my medical decision making. Plan: Symptomatic treatment with percocet, reassess, and general surgery follow-up. ED RESULTS   Laboratory results:  Labs Reviewed - No data to display    Radiologic studies results:  No orders to display       ED Medications administered this visit: Medications - No data to display      ED COURSE     Patient with inguinal pain. Pain after lifting. Hernia is reducible. No evidence of obstruction. Pain better with ED tx. Plan lifting precautions, Pain medication for home and follow-up with General surgery. The diagnosis, extensive differential diagnosis, laboratory and imaging findings were discussed at the bedside. The patient was an active participant in their care. They are agreeable to the plan of care. All questions and concerns were addressed at the time of the encounter. MEDICATION CHANGES     DISCHARGE MEDICATIONS:  Discharge Medication List as of 2/13/2022 12:09 PM      START taking these medications    Details   oxyCODONE-acetaminophen (PERCOCET) 5-325 MG per tablet Take 1 tablet by mouth every 6 hours as needed for Pain for up to 3 days. Intended supply: 3 days.  Take lowest dose possible to manage pain, Disp-12 tablet, R-0Print                  FINAL DISPOSITION     Final diagnoses:   Right inguinal pain     Condition: condition: good  Dispo: Discharge to home    PATIENT REFERRED TO:  Rodrigo Darby Dr.  [facility]  [phone]    Schedule an appointment as soon as possible for a visit in 3 days      [facility] ARIELLE Zimmerman [address]  [facility] [place] [address] [facility]  [phone]    Call in 1 day        Critical Care Time   None      This transcription was electronically signed. Parts of this transcriptions may have been dictated by use of voice recognition software and electronically transcribed, and parts may have been transcribed with the assistance of an ED scribe. The transcription may contain errors not detected in proofreading.     Electronically Signed: Kar Flores DO, 02/13/22, 10:54 AM     Kar Flores DO  02/13/22 6472

## 2022-02-13 NOTE — ED NOTES
Patient presents to the ED with complaints of right side groin pain. He has an inguinal hernia. He states that on 2-12-22 he helped to move a refrigerator but didn't feel the pain at that time. He states that he later picked up a computer tower and that's when the excruciating pain hit him. He also states that he only has the pain when he's up and walking. He is scheduled to have surgery on 2-17-22 to repair the hernia.      Martin Winston, TIMOTHY  25/72/62 9588

## 2022-02-17 ENCOUNTER — ANESTHESIA EVENT (OUTPATIENT)
Dept: OPERATING ROOM | Age: 67
End: 2022-02-17
Payer: MEDICARE

## 2022-02-17 ENCOUNTER — ANESTHESIA (OUTPATIENT)
Dept: OPERATING ROOM | Age: 67
End: 2022-02-17
Payer: MEDICARE

## 2022-02-17 ENCOUNTER — HOSPITAL ENCOUNTER (OUTPATIENT)
Age: 67
Setting detail: OUTPATIENT SURGERY
Discharge: HOME | End: 2022-02-17
Attending: SURGERY | Admitting: SURGERY
Payer: MEDICARE

## 2022-02-17 VITALS
DIASTOLIC BLOOD PRESSURE: 59 MMHG | OXYGEN SATURATION: 100 % | RESPIRATION RATE: 1 BRPM | SYSTOLIC BLOOD PRESSURE: 86 MMHG

## 2022-02-17 VITALS
HEART RATE: 88 BPM | BODY MASS INDEX: 32.73 KG/M2 | RESPIRATION RATE: 16 BRPM | DIASTOLIC BLOOD PRESSURE: 79 MMHG | TEMPERATURE: 97.5 F | WEIGHT: 228.6 LBS | HEIGHT: 70 IN | SYSTOLIC BLOOD PRESSURE: 168 MMHG | OXYGEN SATURATION: 97 %

## 2022-02-17 DIAGNOSIS — G89.18 ACUTE POSTOPERATIVE PAIN: Primary | ICD-10-CM

## 2022-02-17 PROCEDURE — 7100000000 HC PACU RECOVERY - FIRST 15 MIN: Performed by: SURGERY

## 2022-02-17 PROCEDURE — 7100000001 HC PACU RECOVERY - ADDTL 15 MIN: Performed by: SURGERY

## 2022-02-17 PROCEDURE — 6360000002 HC RX W HCPCS: Performed by: ANESTHESIOLOGY

## 2022-02-17 PROCEDURE — 6360000002 HC RX W HCPCS: Performed by: SURGERY

## 2022-02-17 PROCEDURE — 3600000002 HC SURGERY LEVEL 2 BASE: Performed by: SURGERY

## 2022-02-17 PROCEDURE — C1781 MESH (IMPLANTABLE): HCPCS | Performed by: SURGERY

## 2022-02-17 PROCEDURE — 49505 PRP I/HERN INIT REDUC >5 YR: CPT | Performed by: SURGERY

## 2022-02-17 PROCEDURE — 2500000003 HC RX 250 WO HCPCS: Performed by: NURSE ANESTHETIST, CERTIFIED REGISTERED

## 2022-02-17 PROCEDURE — 2580000003 HC RX 258: Performed by: SURGERY

## 2022-02-17 PROCEDURE — 6370000000 HC RX 637 (ALT 250 FOR IP): Performed by: SURGERY

## 2022-02-17 PROCEDURE — 2709999900 HC NON-CHARGEABLE SUPPLY: Performed by: SURGERY

## 2022-02-17 PROCEDURE — 7100000011 HC PHASE II RECOVERY - ADDTL 15 MIN: Performed by: SURGERY

## 2022-02-17 PROCEDURE — 7100000010 HC PHASE II RECOVERY - FIRST 15 MIN: Performed by: SURGERY

## 2022-02-17 PROCEDURE — 3600000012 HC SURGERY LEVEL 2 ADDTL 15MIN: Performed by: SURGERY

## 2022-02-17 PROCEDURE — 2500000003 HC RX 250 WO HCPCS: Performed by: SURGERY

## 2022-02-17 PROCEDURE — 6360000002 HC RX W HCPCS: Performed by: NURSE ANESTHETIST, CERTIFIED REGISTERED

## 2022-02-17 PROCEDURE — 3700000001 HC ADD 15 MINUTES (ANESTHESIA): Performed by: SURGERY

## 2022-02-17 PROCEDURE — 3700000000 HC ANESTHESIA ATTENDED CARE: Performed by: SURGERY

## 2022-02-17 DEVICE — IMPLANTABLE DEVICE: Type: IMPLANTABLE DEVICE | Site: GROIN | Status: FUNCTIONAL

## 2022-02-17 RX ORDER — FENTANYL CITRATE 50 UG/ML
50 INJECTION, SOLUTION INTRAMUSCULAR; INTRAVENOUS EVERY 5 MIN PRN
Status: DISCONTINUED | OUTPATIENT
Start: 2022-02-17 | End: 2022-02-17 | Stop reason: HOSPADM

## 2022-02-17 RX ORDER — LIDOCAINE HYDROCHLORIDE 10 MG/ML
INJECTION, SOLUTION EPIDURAL; INFILTRATION; INTRACAUDAL; PERINEURAL PRN
Status: DISCONTINUED | OUTPATIENT
Start: 2022-02-17 | End: 2022-02-17 | Stop reason: ALTCHOICE

## 2022-02-17 RX ORDER — FENTANYL CITRATE 50 UG/ML
INJECTION, SOLUTION INTRAMUSCULAR; INTRAVENOUS PRN
Status: DISCONTINUED | OUTPATIENT
Start: 2022-02-17 | End: 2022-02-17 | Stop reason: SDUPTHER

## 2022-02-17 RX ORDER — LIDOCAINE HCL/PF 100 MG/5ML
SYRINGE (ML) INJECTION PRN
Status: DISCONTINUED | OUTPATIENT
Start: 2022-02-17 | End: 2022-02-17 | Stop reason: SDUPTHER

## 2022-02-17 RX ORDER — SODIUM CHLORIDE 9 MG/ML
25 INJECTION, SOLUTION INTRAVENOUS PRN
Status: DISCONTINUED | OUTPATIENT
Start: 2022-02-17 | End: 2022-02-17 | Stop reason: HOSPADM

## 2022-02-17 RX ORDER — ONDANSETRON 2 MG/ML
4 INJECTION INTRAMUSCULAR; INTRAVENOUS EVERY 6 HOURS PRN
Status: DISCONTINUED | OUTPATIENT
Start: 2022-02-17 | End: 2022-02-17 | Stop reason: HOSPADM

## 2022-02-17 RX ORDER — ONDANSETRON 2 MG/ML
INJECTION INTRAMUSCULAR; INTRAVENOUS PRN
Status: DISCONTINUED | OUTPATIENT
Start: 2022-02-17 | End: 2022-02-17 | Stop reason: SDUPTHER

## 2022-02-17 RX ORDER — SODIUM CHLORIDE 9 MG/ML
INJECTION, SOLUTION INTRAVENOUS CONTINUOUS
Status: DISCONTINUED | OUTPATIENT
Start: 2022-02-17 | End: 2022-02-17 | Stop reason: HOSPADM

## 2022-02-17 RX ORDER — SODIUM CHLORIDE 0.9 % (FLUSH) 0.9 %
5-40 SYRINGE (ML) INJECTION EVERY 12 HOURS SCHEDULED
Status: DISCONTINUED | OUTPATIENT
Start: 2022-02-17 | End: 2022-02-17 | Stop reason: HOSPADM

## 2022-02-17 RX ORDER — SODIUM CHLORIDE 0.9 % (FLUSH) 0.9 %
5-40 SYRINGE (ML) INJECTION PRN
Status: DISCONTINUED | OUTPATIENT
Start: 2022-02-17 | End: 2022-02-17 | Stop reason: HOSPADM

## 2022-02-17 RX ORDER — PROPOFOL 10 MG/ML
INJECTION, EMULSION INTRAVENOUS PRN
Status: DISCONTINUED | OUTPATIENT
Start: 2022-02-17 | End: 2022-02-17 | Stop reason: SDUPTHER

## 2022-02-17 RX ORDER — HYDROCODONE BITARTRATE AND ACETAMINOPHEN 5; 325 MG/1; MG/1
1 TABLET ORAL EVERY 4 HOURS PRN
Status: DISCONTINUED | OUTPATIENT
Start: 2022-02-17 | End: 2022-02-17 | Stop reason: HOSPADM

## 2022-02-17 RX ORDER — ONDANSETRON 2 MG/ML
4 INJECTION INTRAMUSCULAR; INTRAVENOUS
Status: DISCONTINUED | OUTPATIENT
Start: 2022-02-17 | End: 2022-02-17 | Stop reason: HOSPADM

## 2022-02-17 RX ORDER — HYDROCODONE BITARTRATE AND ACETAMINOPHEN 5; 325 MG/1; MG/1
1 TABLET ORAL EVERY 4 HOURS PRN
Qty: 30 TABLET | Refills: 0 | Status: SHIPPED | OUTPATIENT
Start: 2022-02-17 | End: 2022-02-22

## 2022-02-17 RX ORDER — GLYCOPYRROLATE 1 MG/5 ML
SYRINGE (ML) INTRAVENOUS PRN
Status: DISCONTINUED | OUTPATIENT
Start: 2022-02-17 | End: 2022-02-17 | Stop reason: SDUPTHER

## 2022-02-17 RX ORDER — MEPERIDINE HYDROCHLORIDE 25 MG/ML
12.5 INJECTION INTRAMUSCULAR; INTRAVENOUS; SUBCUTANEOUS EVERY 5 MIN PRN
Status: DISCONTINUED | OUTPATIENT
Start: 2022-02-17 | End: 2022-02-17 | Stop reason: HOSPADM

## 2022-02-17 RX ORDER — DEXAMETHASONE SODIUM PHOSPHATE 10 MG/ML
INJECTION, EMULSION INTRAMUSCULAR; INTRAVENOUS PRN
Status: DISCONTINUED | OUTPATIENT
Start: 2022-02-17 | End: 2022-02-17 | Stop reason: SDUPTHER

## 2022-02-17 RX ORDER — CEFAZOLIN SODIUM 2 G/100ML
2000 INJECTION, SOLUTION INTRAVENOUS
Status: COMPLETED | OUTPATIENT
Start: 2022-02-17 | End: 2022-02-17

## 2022-02-17 RX ADMIN — FENTANYL CITRATE 100 MCG: 50 INJECTION, SOLUTION INTRAMUSCULAR; INTRAVENOUS at 09:03

## 2022-02-17 RX ADMIN — SODIUM CHLORIDE: 9 INJECTION, SOLUTION INTRAVENOUS at 08:51

## 2022-02-17 RX ADMIN — SODIUM CHLORIDE: 9 INJECTION, SOLUTION INTRAVENOUS at 08:50

## 2022-02-17 RX ADMIN — HYDROMORPHONE HYDROCHLORIDE 0.25 MG: 1 INJECTION, SOLUTION INTRAMUSCULAR; INTRAVENOUS; SUBCUTANEOUS at 10:04

## 2022-02-17 RX ADMIN — DEXAMETHASONE SODIUM PHOSPHATE 10 MG: 10 INJECTION, EMULSION INTRAMUSCULAR; INTRAVENOUS at 09:12

## 2022-02-17 RX ADMIN — ONDANSETRON 4 MG: 2 INJECTION INTRAMUSCULAR; INTRAVENOUS at 09:13

## 2022-02-17 RX ADMIN — HYDROMORPHONE HYDROCHLORIDE 0.25 MG: 1 INJECTION, SOLUTION INTRAMUSCULAR; INTRAVENOUS; SUBCUTANEOUS at 09:55

## 2022-02-17 RX ADMIN — CEFAZOLIN SODIUM 2000 MG: 2 INJECTION, SOLUTION INTRAVENOUS at 09:08

## 2022-02-17 RX ADMIN — PROPOFOL 200 MG: 10 INJECTION, EMULSION INTRAVENOUS at 09:07

## 2022-02-17 RX ADMIN — Medication 0.2 MG: at 09:11

## 2022-02-17 RX ADMIN — HYDROCODONE BITARTRATE AND ACETAMINOPHEN 1 TABLET: 5; 325 TABLET ORAL at 11:08

## 2022-02-17 RX ADMIN — Medication 80 MG: at 09:06

## 2022-02-17 ASSESSMENT — PULMONARY FUNCTION TESTS
PIF_VALUE: 1
PIF_VALUE: 12
PIF_VALUE: 1
PIF_VALUE: 15
PIF_VALUE: 17
PIF_VALUE: 1
PIF_VALUE: 12
PIF_VALUE: 13
PIF_VALUE: 5
PIF_VALUE: 15
PIF_VALUE: 12
PIF_VALUE: 7
PIF_VALUE: 12
PIF_VALUE: 9
PIF_VALUE: 17
PIF_VALUE: 10
PIF_VALUE: 6
PIF_VALUE: 13
PIF_VALUE: 18
PIF_VALUE: 12
PIF_VALUE: 17
PIF_VALUE: 11
PIF_VALUE: 11
PIF_VALUE: 1
PIF_VALUE: 1
PIF_VALUE: 11
PIF_VALUE: 1
PIF_VALUE: 17
PIF_VALUE: 15
PIF_VALUE: 11
PIF_VALUE: 12
PIF_VALUE: 5
PIF_VALUE: 11
PIF_VALUE: 11
PIF_VALUE: 1
PIF_VALUE: 10
PIF_VALUE: 1

## 2022-02-17 ASSESSMENT — PAIN DESCRIPTION - LOCATION: LOCATION: ABDOMEN

## 2022-02-17 ASSESSMENT — PAIN SCALES - GENERAL
PAINLEVEL_OUTOF10: 3
PAINLEVEL_OUTOF10: 5
PAINLEVEL_OUTOF10: 5
PAINLEVEL_OUTOF10: 6
PAINLEVEL_OUTOF10: 2
PAINLEVEL_OUTOF10: 6
PAINLEVEL_OUTOF10: 6
PAINLEVEL_OUTOF10: 0

## 2022-02-17 ASSESSMENT — PAIN DESCRIPTION - DESCRIPTORS: DESCRIPTORS: ACHING

## 2022-02-17 ASSESSMENT — PAIN DESCRIPTION - FREQUENCY: FREQUENCY: CONTINUOUS

## 2022-02-17 ASSESSMENT — PAIN DESCRIPTION - ORIENTATION: ORIENTATION: RIGHT;LOWER

## 2022-02-17 ASSESSMENT — PAIN DESCRIPTION - PAIN TYPE: TYPE: SURGICAL PAIN

## 2022-02-17 ASSESSMENT — PAIN - FUNCTIONAL ASSESSMENT: PAIN_FUNCTIONAL_ASSESSMENT: 0-10

## 2022-02-17 NOTE — PROGRESS NOTES
Pt has met discharge criteria and states he is ready for discharge to home. IV removed, gauze and tape applied. Dressed in own clothes and personal belongings gathered. Discharge instructions (with opioid medication education information) given to pt and family; pt and family verbalized understanding of discharge instructions, prescriptions and follow up appointments. Pt transported to discharge lobby by [facility] staff.

## 2022-02-17 NOTE — ANESTHESIA PRE PROCEDURE
Department of Anesthesiology  Preprocedure Note       Name:  Jessica Pride   Age:  74 y.o.  :  1955                                          MRN:  [id number]         Date:  2022      Surgeon: Dorota Ladd):  Flori Fernandez DO    Procedure: Procedure(s):  RIGHT INGUINAL HERNIA REPAIR WITH MESH    Medications prior to admission:   Prior to Admission medications    Medication Sig Start Date End Date Taking? Authorizing Provider   vitamin D (CHOLECALCIFEROL) 25 MCG (1000 UT) TABS tablet Take 1,000 Units by mouth daily    Historical Provider, MD   ascorbic acid (VITAMIN C) 500 MG tablet Take 500 mg by mouth daily    Historical Provider, MD   zinc 50 MG CAPS Take by mouth    Historical Provider, MD   OLIVE LEAF EXTRACT PO Take 1 tablet by mouth daily    Historical Provider, MD   losartan (COZAAR) 100 MG tablet TAKE 1 TABLET DAILY 21   Ric Stacks, DO   amLODIPine (NORVASC) 10 MG tablet Take 1 tablet by mouth once daily 21   Ric Stacks, DO   lidocaine (XYLOCAINE) 5 % ointment Apply topically as needed. 19   Ole Pretty, APRN - CNP   hydrocortisone (PROCTOZONE-HC) 2.5 % rectal cream APPLY ONE CREAM RECTALLY TWICE DAILY FOR  THE  NEXT  FEW  DAYS 19   Ric Stacks, DO   Multiple Vitamins-Minerals (MULTIVITAMIN PO) Take by mouth daily    Historical Provider, MD   nystatin (MYCOSTATIN) 152443 UNIT/GM cream Apply topically as needed Apply topically 2 times daily.  As needed    Historical Provider, MD   VIAGRA 100 MG TABS  6/30/15   Historical Provider, MD       Current medications:    Current Facility-Administered Medications   Medication Dose Route Frequency Provider Last Rate Last Admin    0.9 % sodium chloride infusion   IntraVENous Continuous Rosina Zimmerman DO        sodium chloride flush 0.9 % injection 5-40 mL  5-40 mL IntraVENous 2 times per day Rosina Zimmerman DO        sodium chloride flush 0.9 % injection 5-40 mL  5-40 mL IntraVENous PRN Rosina Zimmerman DO  0.9 % sodium chloride infusion  25 mL IntraVENous PRN Ashish Zimmerman DO        ceFAZolin (ANCEF) 2000 mg in dextrose 5 % 50 mL IVPB  2,000 mg IntraVENous On Call to 2000 Caryl Mann DO           Allergies:  No Known Allergies    Problem List:    Patient Active Problem List   Diagnosis Code    Dermatitis L30.9    Hypertension I10    Erectile dysfunction N52.9    BPH with elevated PSA N40.0, R97.20    Prediabetes R73.03    Vitamin D deficiency E55.9    Right hand pain M79.641    Carpal tunnel syndrome of right wrist G56.01    Obesity (BMI 30-39. 9) E66.9    Insomnia G47.00       Past Medical History:        Diagnosis Date    BPH with elevated PSA 7/17/2015    Follows with urology for this and elevated PSA    Dermatitis     follows with dermDr. Kira Erectile dysfunction     Hypertension     Obesity (BMI 30-39. 9)     Prediabetes 7/29/2016       Past Surgical History:        Procedure Laterality Date    COLONOSCOPY  2007    Dr. Nathalie Grace  2005    Dr Connie Coyne ablation of prostate    PROSTATECTOMY N/A 07/01/2021    Robotic-assisted laparoscopic suprapubic prostatectomy @ [facility]       Social History:    Social History     Tobacco Use    Smoking status: Never Smoker    Smokeless tobacco: Never Used   Substance Use Topics    Alcohol use:  No                                Counseling given: Not Answered      Vital Signs (Current):   Vitals:    02/17/22 0808   BP: (!) 146/81   Pulse: 85   Resp: 18   Temp: 98.2 °F (36.8 °C)   TempSrc: Temporal   SpO2: 96%                                              BP Readings from Last 3 Encounters:   02/17/22 (!) 146/81   02/13/22 (!) 141/85   01/07/22 138/70       NPO Status: Time of last liquid consumption: 2200                        Time of last solid consumption: 1930                        Date of last liquid consumption: 02/16/22                        Date of last solid food consumption: 02/16/22    BMI:   Wt Readings from Last 3 Encounters:   02/13/22 225 lb (102.1 kg)   01/07/22 233 lb 8 oz (105.9 kg)   12/17/21 232 lb (105.2 kg)     There is no height or weight on file to calculate BMI.    CBC:   Lab Results   Component Value Date    WBC 14.0 07/01/2021    RBC 4.72 07/01/2021    HGB 15.8 02/05/2022    HCT 44.8 02/05/2022    MCV 90.1 07/01/2021    RDW 13.4 07/01/2021     07/01/2021       CMP:   Lab Results   Component Value Date     07/01/2021    K 3.7 07/01/2021     07/01/2021    CO2 26 07/01/2021    BUN 17 07/01/2021    CREATININE 0.75 07/01/2021    LABGLOM >90 05/26/2021    GLUCOSE 114 11/20/2021    GLUCOSE 214 06/21/2021    PROT 7.2 05/26/2021    CALCIUM 8.70 07/01/2021    BILITOT 0.3 05/26/2021    ALKPHOS 64 05/26/2021    AST 21 05/26/2021    ALT 16 05/26/2021       POC Tests: No results for input(s): POCGLU, POCNA, POCK, POCCL, POCBUN, POCHEMO, POCHCT in the last 72 hours.     Coags: No results found for: PROTIME, INR, APTT    HCG (If Applicable): No results found for: PREGTESTUR, PREGSERUM, HCG, HCGQUANT     ABGs: No results found for: PHART, PO2ART, KXB0THO, GZD8LPN, BEART, W8VRUSYP     Type & Screen (If Applicable):  Lab Results   Component Value Date    LABRH POS 08/12/2019       Drug/Infectious Status (If Applicable):  No results found for: HIV, HEPCAB    COVID-19 Screening (If Applicable):   Lab Results   Component Value Date    COVID19 Not Detected 11/18/2020           Anesthesia Evaluation  Patient summary reviewed and Nursing notes reviewed no history of anesthetic complications:   Airway: Mallampati: III  TM distance: >3 FB   Neck ROM: full  Mouth opening: > = 3 FB Dental:          Pulmonary:Negative Pulmonary ROS and normal exam  breath sounds clear to auscultation                             Cardiovascular:  Exercise tolerance: good (>4 METS),   (+) hypertension:,       ECG reviewed                        Neuro/Psych:   (+) neuromuscular disease:,             GI/Hepatic/Renal: ROS comment: obesity. Endo/Other:                      ROS comment: prediabetes Abdominal:             Vascular: negative vascular ROS. Other Findings:             Anesthesia Plan      general     ASA 3       Induction: intravenous. MIPS: Postoperative opioids intended and Prophylactic antiemetics administered. Anesthetic plan and risks discussed with patient. Plan discussed with CRNA.                   [address] Jesus,    2/17/2022

## 2022-02-17 NOTE — PROGRESS NOTES
Pt admitted to [facility] room 11 and oriented to unit. SCD sleeves applied. Nares swabbed. Pt verbalized permission for first name, last initial and physicians name on white board. SDS board and discharge criteria explained, pt and family verbalized understanding. Pt denies thoughts of harming self or others. Call light in reach. Family at the bedside.

## 2022-02-17 NOTE — H&P
Rosina Samuel. Radhaser, [address]  [phone]  New Patient Evaluation in Office    Pt Name: Jessica Pride  Date of Birth 1955   Today's Date: 1/7/2022  Medical Record Number: [id number]  Referring Provider: Elly Boston DO  Primary Care Provider: Ric Bose DO  Chief Complaint   Patient presents with   Laxmi Current Surgical Consult     New patient-referred by Dr Corral Friendly inguinal hernia     ASSESSMENT       Diagnosis Orders   1. Inguinal hernia, right     2. Pre-op testing       Past Medical History:   Diagnosis Date    BPH with elevated PSA 7/17/2015    Follows with urology for this and elevated PSA    Dermatitis     follows with Dr. Ailyn trevino Erectile dysfunction     Hypertension     Obesity (BMI 30-39. 9)     Prediabetes 7/29/2016          PLANS      1. Schedule Raffaele for right inguinal hernia repair with mesh  2. I had a discussion with the patient regarding potential therapies for hernias and the risks of hernia surgery to include bleeding, infection, recurrence, injury to surrounding structures and organs, and continued pain in the area. We also discussed the use of mesh and its advantages and disadvantages. We discussed the anesthetic options, conduct of the operation, outpatient status, post op recovery and length of time off of work. After this discussion, the patient's questions were answered and has elected to proceed with surgical repair. 3. Status: outpatient  4. Planned anesthesia: general  5. He will undergo pre-operative clearance per anesthesia guidelines with risk factors listed under the past medical history diagnosis & problem list.     Татьяна Denny is a 74 y.o. male seen in the consultation for evaluation of a right inguinal hernia. Symptoms were first noted a few months ago and has gradually worsened since that time.  The pain is dull, intermittent, moderate in severity. It is aggravated by Valsalva maneuvers and palliated by rest. He has no additional symptoms. He denies signs or symptoms of incarceration or strangulation. He has not had prior right inguinal hernia surgery. This hernia is not stated to be work related per patient. Past Medical History  Past Medical History:   Diagnosis Date    BPH with elevated PSA 7/17/2015    Follows with urology for this and elevated PSA    Dermatitis     follows with derm, Dr. Raymond James Erectile dysfunction     Hypertension     Obesity (BMI 30-39. 9)     Prediabetes 7/29/2016     Past Surgical History  Past Surgical History:   Procedure Laterality Date    COLONOSCOPY  2007    Dr. Fer Clarke  2005    Dr Stephanie Duque ablation of prostate    PROSTATECTOMY N/A 07/01/2021    Robotic-assisted laparoscopic suprapubic prostatectomy @ [facility]     Medications  Current Outpatient Medications   Medication Sig Dispense Refill    vitamin D (CHOLECALCIFEROL) 25 MCG (1000 UT) TABS tablet Take 1,000 Units by mouth daily      ascorbic acid (VITAMIN C) 500 MG tablet Take 500 mg by mouth daily      zinc 50 MG CAPS Take by mouth      OLIVE LEAF EXTRACT PO Take 1 tablet by mouth daily      losartan (COZAAR) 100 MG tablet TAKE 1 TABLET DAILY 90 tablet 3    amLODIPine (NORVASC) 10 MG tablet Take 1 tablet by mouth once daily 90 tablet 3    lidocaine (XYLOCAINE) 5 % ointment Apply topically as needed. 50 g 1    hydrocortisone (PROCTOZONE-HC) 2.5 % rectal cream APPLY ONE CREAM RECTALLY TWICE DAILY FOR  THE  NEXT  FEW  DAYS 3 Tube 3    Multiple Vitamins-Minerals (MULTIVITAMIN PO) Take by mouth daily      nystatin (MYCOSTATIN) 797867 UNIT/GM cream Apply topically as needed Apply topically 2 times daily. As needed      VIAGRA 100 MG TABS        No current facility-administered medications for this visit. Allergies  has No Known Allergies.   Family History  family history includes Cancer in his father; High Blood Pressure in his mother. Social History   reports that he has never smoked. He has never used smokeless tobacco. He reports that he does not drink alcohol and does not use drugs. Health Screening Exams  Health Maintenance   Topic Date Due    PSA counseling  10/07/2021    Lipid screen  05/26/2022    Depression Screen  05/26/2022    Annual Wellness Visit (AWV)  05/27/2022    Potassium monitoring  07/01/2022    Creatinine monitoring  07/01/2022    A1C test (Diabetic or Prediabetic)  11/20/2022    Colon cancer screen fecal DNA test (Cologuard)  06/17/2023    DTaP/Tdap/Td vaccine (2 - Td or Tdap) 07/17/2025    Flu vaccine  Completed    Shingles Vaccine  Completed    Pneumococcal 65+ years Vaccine  Completed    COVID-19 Vaccine  Completed    Hepatitis C screen  Completed    Hepatitis A vaccine  Aged Out    Hepatitis B vaccine  Aged Out    Hib vaccine  Aged Out    Meningococcal (ACWY) vaccine  Aged Out     Review of Systems  Constitutional: Negative for activity change, appetite change, chills, diaphoresis, fatigue, fever and unexpected weight change. HENT: Negative for congestion, dental problem, drooling, ear discharge, ear pain, facial swelling, hearing loss, mouth sores, nosebleeds, postnasal drip, rhinorrhea, sinus pressure, sinus pain, sneezing, sore throat, tinnitus, trouble swallowing and voice change. Eyes: Negative for photophobia, pain, discharge, redness, itching and visual disturbance. Respiratory: Negative for apnea, cough, choking, chest tightness, shortness of breath, wheezing and stridor. Cardiovascular: Negative for chest pain, palpitations and leg swelling. Gastrointestinal: Negative for abdominal distention, abdominal pain, anal bleeding, blood in stool, constipation, diarrhea, nausea, rectal pain and vomiting. Endocrine: Negative for cold intolerance, heat intolerance, polydipsia, polyphagia and polyuria.    Genitourinary: Negative for decreased urine volume, difficulty urinating, dysuria, enuresis, flank pain, frequency, genital sores, hematuria, penile discharge, penile pain, penile swelling, scrotal swelling, testicular pain and urgency. Musculoskeletal: Negative for arthralgias, back pain, gait problem, joint swelling, myalgias, neck pain and neck stiffness. Skin: Negative for color change, pallor, rash and wound. Allergic/Immunologic: Negative for environmental allergies, food allergies and immunocompromised state. Neurological: Negative for dizziness, tremors, seizures, syncope, facial asymmetry, speech difficulty, weakness, light-headedness, numbness and headaches. Hematological: Negative for adenopathy. Does not bruise/bleed easily. Psychiatric/Behavioral: Negative for agitation, behavioral problems, confusion, decreased concentration, dysphoric mood, hallucinations, self-injury, sleep disturbance and suicidal ideas. The patient is not nervous/anxious and is not hyperactive. OBJECTIVE    VITALS:  height is 5' 8.5\" (1.74 m) and weight is 233 lb 8 oz (105.9 kg). His temporal temperature is 97.9 °F (36.6 °C). His blood pressure is 138/70 and his pulse is 95. His respiration is 18 and oxygen saturation is 97%. Pain Score:   0 - No pain Body mass index is 34.99 kg/m². CONSTITUTIONAL: Alert and oriented times 3, no acute distress and cooperative to examination with proper mood and affect. SKIN: Skin color, texture, turgor normal. No rashes or lesions. LYMPH: no cervical nodes, no inguinal nodes  HEENT: Head is normocephalic, atraumatic. EOMI, PERRLA. NECK: Supple, symmetrical, trachea midline, no adenopathy, thyroid symmetric, not enlarged and no tenderness, skin normal.  CHEST/LUNGS: chest symmetric with normal A/P diameter, normal respiratory rate and rhythm, lungs clear to auscultation without wheezes, rales or rhonchi. No accessory muscle use. Scars None   CARDIOVASCULAR: Heart sounds are normal.  Regular rate and rhythm without murmur, gallop or rub. Normal S1 and S2. Carotid and femoral pulses 2+/4 and equal bilaterally. ABDOMEN: obese robotic scar(s) present. Normal bowel sounds. No bruits. soft, nontender, nondistended, no masses or organomegaly. right inguinal hernia present which is reducible. Percussion: Normal without hepatosplenomegally. RECTAL: deferred, not clinically indicated  NEUROLOGIC: There are no focalizing motor or sensory deficits. CN II-XII are grossly intact. Vonne Dac EXTREMITIES: no cyanosis, no clubbing and no edema. Thank you for the interesting evaluation. Further recommendations as listed above. Electronically signed by Ai Cotton DO on 1/7/2022 at 11:08 AM      DO DAREK Davidson DR GENERAL SURGERY  History and Physical Update    Pt Name: Deane Scheuermann  MRN: [id number]  YOB: 1955  Date of evaluation: 2/17/2022    I have examined the patient and reviewed the H&P/Consult and there are no changes to the patient or plans.          Electronically signed by Ai Cotton DO on 2/17/2022 at 8:11 AM

## 2022-02-17 NOTE — ANESTHESIA POSTPROCEDURE EVALUATION
Department of Anesthesiology  Postprocedure Note    Patient: Vijay Hylton  MRN: [id number]  YOB: 1955  Date of evaluation: 2/17/2022  Time:  10:28 AM     Procedure Summary     Date: 02/17/22 Room / Location: [address] [place], [place] [address] [place]    Anesthesia Start: 0900 Anesthesia Stop: 4851    Procedure: RIGHT INGUINAL HERNIA REPAIR WITH MESH (N/A Groin) Diagnosis: (RIGHT INGUINAL HERNIA)    Surgeons: Aram Cali DO Responsible Provider: Johanna De La Torre DO    Anesthesia Type: general ASA Status: 3          Anesthesia Type: general    Palmer Phase I: Palmer Score: 10    Palmer Phase II:      Last vitals: Reviewed and per EMR flowsheets.        Anesthesia Post Evaluation    Patient location during evaluation: PACU  Patient participation: complete - patient participated  Level of consciousness: awake and alert  Pain score: 2  Airway patency: patent  Nausea & Vomiting: no nausea and no vomiting  Complications: no  Cardiovascular status: hemodynamically stable and blood pressure returned to baseline  Respiratory status: spontaneous ventilation, room air and acceptable  Hydration status: stable

## 2022-02-17 NOTE — OP NOTE
[address]  RECORD OF OPERATION  [facility]: [facility] REC[facility] NO[facility] [id number]  SURGEON: JEANNETTE Lomeli  PRIMARY CARE PHYSICIAN: Marlee Greenberg DO     PREOPERATIVE DIAGNOSIS:  RIGHT INGUINAL HERNIA  POSTOPERATIVE DIAGNOSES:  Indirect right inguinal hernia   PROCEDURE PERFORMED:  right inguinal hernia repair with mesh. SURGEON:  Dr. Bashir Zimmerman,  ANESTHESIA:  general  ESTIMATED BLOOD LOSS:  3 ml. SPECIMENS:  None. COMPLICATIONS:  None immediately appreciated. DISCUSSION:  Sury Delgado is a 74 y.o.-year male who presented to my office as a referral from Marlee Greenberg DO regarding a right inguinal hernia. After history and physical examination was performed, potential diagnostic and therapeutic modalities discussed with the patient. Operative and nonoperative management was discussed. Laparoscopic and open approach with use of mesh reviewed. He was given opportunity to ask questions. Once answered informed consent was obtained. The patient was brought to the operating room on 2/17/2022 for procedure. OPERATIVE FINDINGS:  At the time of exploration the patient was found to have and indirect defect which was subsequently repaired as described below. PROCEDURE:  The patient was brought to the operating room, placed in supine position, placed under continuous cardiac telemetry, blood pressure and pulse oximetry monitoring and placed under general anesthesia by the anesthesia department. The right groin was then prepped and draped in a sterile fashion. A right inguinal incision was made with a #10 scalpel blade and carried down to subcutaneous tissue. Subcutaneous tissue dissection carried out with electrocautery down to the external aponeurosis to which was then incised along its fibers using the Metzenbaum scissors allowing safe entry into the canal. The cord and its contents were grasped and elevated off the floor using blunt dissection.  A Penrose drain placed for manipulation purposes and the floor of the canal was noted be intact. No evidence of direct hernia was evident. An indirect hernia sac was identified. The hernia sac was dissected free from the surrounding tissues with care to preserve the cord structures and then was reduced back within the internal ring. A Small  Marlex plug was placed in the defect and secured in place using 2-0 Prolene suture. A Marlex patch was then fashioned to size, attached to the pubic tubercle, inguinal ligament, transverse abdominus muscle in not constricting fashion around the cord using 2-0 Prolene and 2-0 Vicryl suture. The cord and its contents placed back within the canal. The external aponeurosis was then closed using a running 2-0 Vicryl suture an ilioinguinal nerve block performed and the skin edges infiltrated with local anesthetic. The Gokul's fascia approximated using 2-0 Vicryl suture, deep dermis approximated using 3-0 Vicryl suture and the skin closed using a 4-0 Monocryl suture in a running subcuticular fashion. The was then cleansed, prepared with Mastisol, steri-strips and sterile dressings were applied. The patient brought out of anesthesia, transferred to PACU in stable and satisfactory condition. No immediate complication evident. All sponge, instrument and needle counts were correct at the completion of the procedure. Postoperative findings were discussed with the patient's family. He was given discharge instructions, prescriptions for analgesics, and will follow up in my office in 2 week period of time for reevaluation.       Electronically signed by Flori Fernandez DO on 2/17/2022 at 9:24 AM

## 2022-02-18 ENCOUNTER — TELEPHONE (OUTPATIENT)
Dept: SURGERY | Age: 67
End: 2022-02-18

## 2022-02-18 NOTE — TELEPHONE ENCOUNTER
Pt states that he is doing well this morning after surgery. States that he is up and moving around. He states that he is urinating with no issues, and will take stool softeners/laxatives to help produce BM, as needed. Pt states that his incisions are clean, dry and intact; denies any s/sx of infection. Advised to call the office with any questions or concerns.

## 2022-03-04 ENCOUNTER — OFFICE VISIT (OUTPATIENT)
Dept: SURGERY | Age: 67
End: 2022-03-04

## 2022-03-04 VITALS
RESPIRATION RATE: 15 BRPM | HEART RATE: 92 BPM | SYSTOLIC BLOOD PRESSURE: 137 MMHG | TEMPERATURE: 97.2 F | DIASTOLIC BLOOD PRESSURE: 77 MMHG | BODY MASS INDEX: 32.93 KG/M2 | OXYGEN SATURATION: 99 % | WEIGHT: 230 LBS | HEIGHT: 70 IN

## 2022-03-04 DIAGNOSIS — Z87.19 S/P RIGHT INGUINAL HERNIORRHAPHY: Primary | ICD-10-CM

## 2022-03-04 DIAGNOSIS — Z98.890 S/P RIGHT INGUINAL HERNIORRHAPHY: Primary | ICD-10-CM

## 2022-03-04 PROCEDURE — 99024 POSTOP FOLLOW-UP VISIT: CPT | Performed by: SURGERY

## 2022-03-04 RX ORDER — AMOXICILLIN 875 MG/1
TABLET, COATED ORAL
COMMUNITY
Start: 2022-02-28 | End: 2022-04-22

## 2022-03-04 NOTE — LETTER
Vanita Stein DO  [address] [facility]. [address]  [place] [address]  [phone]     Pt Name: Marquis Maria Record Number: [id number]  Date of Birth 1955   Today's Date: 3/4/2022    Monty Chu was evaluated in the office today. My assessment and plans are listed below. ASSESSMENT      Diagnosis Orders   1. S/P right inguinal herniorrhaphy      2/17/22   Incision is clean, dry and intact or healing as expected  PLANS:     Pathology reviewed with the patient who understands. All questions were answered. Patient Instructions   No lifting, pushing or pulling more than 30 lbs for 2 weeks, then 50 lbs for 2 weeks, then 80 lbs for 2 weeks. No restrictions after 6 weeks. Follow up: Return for As needed. Instructed to call if any concerns. If I can provide any additional assistance or you have any concerns, please feel free to contact me. Thank you for allowing to participate in the care of your patients. Sincerely,      Ramakrishna Stover.  Arya Zimmerman

## 2022-03-04 NOTE — PROGRESS NOTES
Sabiha Gil. [facility], [address] [place] [id number]  [phone]  Post Procedure Evaluation in Office    Pt Name: Yocasta Duong  Date of Birth 1955   Today's Date: 3/4/2022  Medical Record Number: [id number]  Referring Provider: No ref. provider found  Primary Care Provider: Ruiz Bowen DO  Chief Complaint   Patient presents with   Caity Sarmiento Post-Op Check     s/p right inguinal hernia repair with mesh-2/17/2022     ASSESSMENT       Diagnosis Orders   1. S/P right inguinal herniorrhaphy      2/17/22   Incision is clean, dry and intact or healing as expected   PLANS      Pathology reviewed with the patient who understands. All questions were answered. Patient Instructions   No lifting, pushing or pulling more than 30 lbs for 2 weeks, then 50 lbs for 2 weeks, then 80 lbs for 2 weeks. No restrictions after 6 weeks. Follow up: Return for As needed. Instructed to call if any concerns. Isabell Garcia is seen today for post-op follow-up. He is S/P right inguinal hernia repair 2/17/22. He is tolerating a regular diet, having regular bowel movements. Symptoms and activity have gradually improved compared to preoperative. The surgical site is clean and has no drainage. Pain is controlled without any narcotic pain medications. He has compliant with postoperative instructions. Past Medical History  Past Medical History:   Diagnosis Date    BPH with elevated PSA 7/17/2015    Follows with urology for this and elevated PSA    Dermatitis     follows with uriel, Dr. Mala Markham Erectile dysfunction     Hypertension     Obesity (BMI 30-39. 9)     Prediabetes 7/29/2016     Past Surgical History  Past Surgical History:   Procedure Laterality Date    COLONOSCOPY  2007    Dr. Stacie Ray N/A 2/17/2022    RIGHT INGUINAL HERNIA REPAIR WITH MESH performed by Pavel Yi DO at [address] [facility]  2005     Sylvester-holminum laser ablation of prostate    PROSTATECTOMY N/A 07/01/2021    Robotic-assisted laparoscopic suprapubic prostatectomy @ [facility]     Medications  Current Outpatient Medications   Medication Sig Dispense Refill    amoxicillin (AMOXIL) 875 MG tablet TAKE 1 TABLET BY MOUTH TWICE DAILY      vitamin D (CHOLECALCIFEROL) 25 MCG (1000 UT) TABS tablet Take 1,000 Units by mouth daily      ascorbic acid (VITAMIN C) 500 MG tablet Take 500 mg by mouth daily      zinc 50 MG CAPS Take by mouth      OLIVE LEAF EXTRACT PO Take 1 tablet by mouth daily      losartan (COZAAR) 100 MG tablet TAKE 1 TABLET DAILY 90 tablet 3    amLODIPine (NORVASC) 10 MG tablet Take 1 tablet by mouth once daily 90 tablet 3    lidocaine (XYLOCAINE) 5 % ointment Apply topically as needed. 50 g 1    hydrocortisone (PROCTOZONE-HC) 2.5 % rectal cream APPLY ONE CREAM RECTALLY TWICE DAILY FOR  THE  NEXT  FEW  DAYS 3 Tube 3    Multiple Vitamins-Minerals (MULTIVITAMIN PO) Take by mouth daily      nystatin (MYCOSTATIN) 941967 UNIT/GM cream Apply topically as needed Apply topically 2 times daily. As needed      VIAGRA 100 MG TABS        No current facility-administered medications for this visit. Allergies  has No Known Allergies. Social History   reports that he has never smoked. He has never used smokeless tobacco. He reports that he does not drink alcohol and does not use drugs.   Health Screening Exams  Health Maintenance   Topic Date Due    PSA counseling  10/07/2021    Lipid screen  05/26/2022    Depression Screen  05/26/2022    Annual Wellness Visit (AWV)  05/27/2022    Potassium monitoring  07/01/2022    Creatinine monitoring  07/01/2022    A1C test (Diabetic or Prediabetic)  11/20/2022    Colorectal Cancer Screen  06/17/2023    DTaP/Tdap/Td vaccine (2 - Td or Tdap) 07/17/2025    Flu vaccine  Completed    Shingles Vaccine  Completed    Pneumococcal 65+ years Vaccine  Completed    COVID-19 Vaccine  Completed    Hepatitis C screen  Completed    Hepatitis A vaccine  Aged Out    Hepatitis B vaccine  Aged Out    Hib vaccine  Aged Out    Meningococcal (ACWY) vaccine  Aged Out     Review of Systems  History obtained from the patient. Constitutional: Denies any fever, chills, fatigue. Wound: Denies any rash, skin color changes or wound problems. Resp: Denies any cough, shortness of breath. CV: Denies any chest pain, orthopnea or syncope. GI: Denies any nausea, vomiting, blood in the stool, constipation or diarrhea. Positive for incisional discomfort only. : Denies any hematuria, hesitancy or dysuria. OBJECTIVE    VITALS:  height is 5' 10\" (1.778 m) and weight is 230 lb (104.3 kg). His tympanic temperature is 97.2 °F (36.2 °C). His blood pressure is 137/77 and his pulse is 92. His respiration is 15 and oxygen saturation is 99%. Pain Score:   2  CONSTITUTIONAL: Alert and oriented times 3, no acute distress and cooperative to examination. SKIN: Skin color, texture, turgor normal. No rashes or lesions. INCISION: wound margins intact and healing well. No signs of infection. No drainage. ABDOMEN: Soft, nondistended, no masses or organomegaly. Bowel sounds normal. right groin scar(s) present with prominent healing ridge. No sign of recurrence, hematoma or seroma. Tenderness to palpation incisional only. NEUROLOGIC: No sensory or motor nerve irritation  MALE : bilateral testes normal, no scrotal swelling or edema       Thank you for the interesting evaluation. Further recommendations as listed above.        Electronically signed by Jaron Perez DO on 3/4/2022 at 12:44 PM

## 2022-04-02 ENCOUNTER — NURSE ONLY (OUTPATIENT)
Dept: LAB | Age: 67
End: 2022-04-02

## 2022-04-02 LAB — PROSTATE SPECIFIC ANTIGEN: 1.38 NG/ML (ref 0–1)

## 2022-04-08 ENCOUNTER — OFFICE VISIT (OUTPATIENT)
Dept: SURGERY | Age: 67
End: 2022-04-08

## 2022-04-08 VITALS
RESPIRATION RATE: 15 BRPM | SYSTOLIC BLOOD PRESSURE: 139 MMHG | OXYGEN SATURATION: 98 % | HEIGHT: 70 IN | TEMPERATURE: 97.2 F | DIASTOLIC BLOOD PRESSURE: 78 MMHG | WEIGHT: 230 LBS | BODY MASS INDEX: 32.93 KG/M2 | HEART RATE: 78 BPM

## 2022-04-08 DIAGNOSIS — Z98.890 S/P RIGHT INGUINAL HERNIORRHAPHY: Primary | ICD-10-CM

## 2022-04-08 DIAGNOSIS — R10.31 RIGHT GROIN PAIN: ICD-10-CM

## 2022-04-08 DIAGNOSIS — Z87.19 S/P RIGHT INGUINAL HERNIORRHAPHY: Primary | ICD-10-CM

## 2022-04-08 PROCEDURE — 99024 POSTOP FOLLOW-UP VISIT: CPT | Performed by: SURGERY

## 2022-04-08 NOTE — PROGRESS NOTES
Hayley Tejeda. [facility], [address] [id number]  [phone]  Post Procedure Evaluation in Office    Pt Name: Amy Flynn  Date of Birth 1955   Today's Date: 4/8/2022  Medical Record Number: [id number]  Referring Provider: No ref. provider found  Primary Care Provider: Kellen Avendano DO  Chief Complaint   Patient presents with    Post-Op Check     s/p right inguinal hernia repair with mesh-2/17/2022 Last seen in the office on 3/4/2022 right groin pain     ASSESSMENT       Diagnosis Orders   1. S/P right inguinal herniorrhaphy  US Extremity Right Non Vascular    2/17/22   2. Right groin pain  US Extremity Right Non Vascular   Incision is clean, dry and intact or healing as expected   PLANS      Unable to palpate a recurrent hernia. US ordered to further evaluate. If no recurrence, may benefit from steroid injection  Follow up: Return for After testing completed. Zoya Carreno is seen today for post-op follow-up. He is S/P right inguinal hernia repair 2/17/22. He is tolerating a regular diet, having regular bowel movements. Symptoms and activity have gradually improved compared to preoperative. The surgical site is clean and has no drainage. About a dozen times over the past month, he gets a very short duration sting that occurs when going from sitting to a standing position. It is located in the medial thigh. Past Medical History  Past Medical History:   Diagnosis Date    BPH with elevated PSA 7/17/2015    Follows with urology for this and elevated PSA    Dermatitis     follows with Dr. Rigo trevino Erectile dysfunction     Hypertension     Obesity (BMI 30-39. 9)     Prediabetes 7/29/2016     Past Surgical History  Past Surgical History:   Procedure Laterality Date    COLONOSCOPY  2007    Dr. Pat Cadena N/A 2/17/2022    RIGHT INGUINAL HERNIA REPAIR WITH MESH performed by Apolinar Garcia DO at [address]  2005    Dr Phan-holminum laser ablation of prostate    PROSTATECTOMY N/A 07/01/2021    Robotic-assisted laparoscopic suprapubic prostatectomy @ [facility]     Medications  Current Outpatient Medications   Medication Sig Dispense Refill    amoxicillin (AMOXIL) 875 MG tablet TAKE 1 TABLET BY MOUTH TWICE DAILY      vitamin D (CHOLECALCIFEROL) 25 MCG (1000 UT) TABS tablet Take 1,000 Units by mouth daily      ascorbic acid (VITAMIN C) 500 MG tablet Take 500 mg by mouth daily      zinc 50 MG CAPS Take by mouth      OLIVE LEAF EXTRACT PO Take 1 tablet by mouth daily      losartan (COZAAR) 100 MG tablet TAKE 1 TABLET DAILY 90 tablet 3    amLODIPine (NORVASC) 10 MG tablet Take 1 tablet by mouth once daily 90 tablet 3    lidocaine (XYLOCAINE) 5 % ointment Apply topically as needed. 50 g 1    hydrocortisone (PROCTOZONE-HC) 2.5 % rectal cream APPLY ONE CREAM RECTALLY TWICE DAILY FOR  THE  NEXT  FEW  DAYS 3 Tube 3    Multiple Vitamins-Minerals (MULTIVITAMIN PO) Take by mouth daily      nystatin (MYCOSTATIN) 962510 UNIT/GM cream Apply topically as needed Apply topically 2 times daily. As needed      VIAGRA 100 MG TABS        No current facility-administered medications for this visit. Allergies  has No Known Allergies. Social History   reports that he has never smoked. He has never used smokeless tobacco. He reports that he does not drink alcohol and does not use drugs.   Health Screening Exams  Health Maintenance   Topic Date Due    Lipid screen  05/26/2022    Depression Screen  05/26/2022    Annual Wellness Visit (AWV)  05/27/2022    Potassium monitoring  07/01/2022    Creatinine monitoring  07/01/2022    A1C test (Diabetic or Prediabetic)  11/20/2022    Colorectal Cancer Screen  06/17/2023    DTaP/Tdap/Td vaccine (2 - Td or Tdap) 07/17/2025    Flu vaccine  Completed    Shingles Vaccine  Completed    Pneumococcal 65+ years Vaccine  Completed    COVID-19 Vaccine  Completed  Hepatitis C screen  Completed    Hepatitis A vaccine  Aged Out    Hepatitis B vaccine  Aged Out    Hib vaccine  Aged Out    Meningococcal (ACWY) vaccine  Aged Out     Review of Systems  History obtained from the patient. Constitutional: Denies any fever, chills, fatigue. Wound: Denies any rash, skin color changes or wound problems. Resp: Denies any cough, shortness of breath. CV: Denies any chest pain, orthopnea or syncope. GI: Denies any nausea, vomiting, blood in the stool, constipation or diarrhea. : Denies any hematuria, hesitancy or dysuria. OBJECTIVE    VITALS:  height is 5' 10\" (1.778 m) and weight is 230 lb (104.3 kg). His tympanic temperature is 97.2 °F (36.2 °C). His blood pressure is 139/78 and his pulse is 78. His respiration is 15 and oxygen saturation is 98%. Pain Score:   0 - No pain  CONSTITUTIONAL: Alert and oriented times 3, no acute distress and cooperative to examination. SKIN: Skin color, texture, turgor normal. No rashes or lesions. INCISION: wound margins intact and healing well. No signs of infection. No drainage. ABDOMEN: Soft, nondistended, no masses or organomegaly. Bowel sounds normal. right groin scar(s) present. No sign of recurrence, hematoma or seroma. Pain is in the medial thigh but not upon palpation. MALE : bilateral testes normal, no scrotal swelling or edema       Thank you for the interesting evaluation. Further recommendations as listed above.        Electronically signed by Henrik Brody DO on 4/8/2022 at 9:35 AM

## 2022-04-08 NOTE — LETTER
[place] DO Luis  [address] [facility]. [address]  [place] [address]  [phone]     Pt Name: Rufino Street Record Number: [id number]  Date of Birth 1955   Today's Date: 4/8/2022    Fatoumata Adams was evaluated in the office today. My assessment and plans are listed below. ASSESSMENT      Diagnosis Orders   1. S/P right inguinal herniorrhaphy  US Extremity Right Non Vascular    2/17/22   2. Right groin pain  US Extremity Right Non Vascular   Incision is clean, dry and intact or healing as expected  PLANS:   Unable to palpate a recurrent hernia. US ordered to further evaluate. If no recurrence, may benefit from steroid injection  Follow up: Return for After testing completed. If I can provide any additional assistance or you have any concerns, please feel free to contact me. Thank you for allowing to participate in the care of your patients. Sincerely,      Elias Hurd.  Arya Zimmerman

## 2022-04-12 ENCOUNTER — HOSPITAL ENCOUNTER (OUTPATIENT)
Dept: ULTRASOUND IMAGING | Age: 67
Discharge: HOME | End: 2022-04-12
Payer: MEDICARE

## 2022-04-12 DIAGNOSIS — R10.31 RIGHT GROIN PAIN: ICD-10-CM

## 2022-04-12 DIAGNOSIS — Z87.19 S/P RIGHT INGUINAL HERNIA REPAIR: ICD-10-CM

## 2022-04-12 DIAGNOSIS — Z98.890 S/P RIGHT INGUINAL HERNIA REPAIR: ICD-10-CM

## 2022-04-12 PROCEDURE — 76882 US LMTD JT/FCL EVL NVASC XTR: CPT

## 2022-04-19 ENCOUNTER — OFFICE VISIT (OUTPATIENT)
Dept: SURGERY | Age: 67
End: 2022-04-19
Payer: MEDICARE

## 2022-04-19 VITALS
RESPIRATION RATE: 18 BRPM | TEMPERATURE: 97 F | HEIGHT: 70 IN | HEART RATE: 72 BPM | DIASTOLIC BLOOD PRESSURE: 70 MMHG | BODY MASS INDEX: 32.88 KG/M2 | SYSTOLIC BLOOD PRESSURE: 128 MMHG | WEIGHT: 229.7 LBS | OXYGEN SATURATION: 98 %

## 2022-04-19 DIAGNOSIS — Z87.19 S/P RIGHT INGUINAL HERNIORRHAPHY: Primary | ICD-10-CM

## 2022-04-19 DIAGNOSIS — R10.31 RIGHT GROIN PAIN: ICD-10-CM

## 2022-04-19 DIAGNOSIS — Z98.890 S/P RIGHT INGUINAL HERNIORRHAPHY: Primary | ICD-10-CM

## 2022-04-19 PROCEDURE — 1123F ACP DISCUSS/DSCN MKR DOCD: CPT | Performed by: SURGERY

## 2022-04-19 PROCEDURE — 3017F COLORECTAL CA SCREEN DOC REV: CPT | Performed by: SURGERY

## 2022-04-19 PROCEDURE — 4040F PNEUMOC VAC/ADMIN/RCVD: CPT | Performed by: SURGERY

## 2022-04-19 PROCEDURE — 1036F TOBACCO NON-USER: CPT | Performed by: SURGERY

## 2022-04-19 PROCEDURE — 99024 POSTOP FOLLOW-UP VISIT: CPT | Performed by: SURGERY

## 2022-04-19 PROCEDURE — G8427 DOCREV CUR MEDS BY ELIG CLIN: HCPCS | Performed by: SURGERY

## 2022-04-19 PROCEDURE — G8417 CALC BMI ABV UP PARAM F/U: HCPCS | Performed by: SURGERY

## 2022-04-19 NOTE — PROGRESS NOTES
Lydia Lopez. [facility], [address] [place] [address] [address] [place] [address]  [phone]  Post Procedure Evaluation in Office    Pt Name: Sofie Hill  Date of Birth 1955   Today's Date: 4/19/2022  Medical Record Number: [id number]  Referring Provider: No ref. provider found  Primary Care Provider: Sintia Blount DO  Chief Complaint   Patient presents with    Results     Follow up after groin ultrasound completed on 4-12-22. ASSESSMENT       Diagnosis Orders   1. S/P right inguinal herniorrhaphy      2/17/22   2. Right groin pain     Incision is clean, dry and intact or healing as expected   PLANS      Unable to palpate a recurrent hernia. US completed showing no evidence of recurrent hernia  Symptoms have only occurred 3 times in the past 2 weeks. Options of observation versus trigger point injection discussed with pt. AT this time, we hold off injections. Follow up: Return for As needed. Instructed to call if any concerns. Cayden Hewitt is seen today for post-op follow-up. He is S/P right inguinal hernia repair 2/17/22. He is tolerating a regular diet, having regular bowel movements. Symptoms and activity have gradually improved compared to preoperative. The surgical site is clean and has no drainage. About a dozen times over the past month, he gets a very short duration sting that occurs when going from sitting to a standing position. It is located in the medial thigh. Past Medical History  Past Medical History:   Diagnosis Date    BPH with elevated PSA 7/17/2015    Follows with urology for this and elevated PSA    Dermatitis     follows with derm, Dr. Tyna Cockayne Erectile dysfunction     Hypertension     Obesity (BMI 30-39. 9)     Prediabetes 7/29/2016     Past Surgical History  Past Surgical History:   Procedure Laterality Date    COLONOSCOPY  2007    Dr. Yadira Castro N/A 2/17/2022    RIGHT INGUINAL HERNIA REPAIR WITH MESH performed by Angel Fish DO at [address] [facility]  2005    Dr Phan-sherin laser ablation of prostate    PROSTATECTOMY N/A 07/01/2021    Robotic-assisted laparoscopic suprapubic prostatectomy @ [facility]     Medications  Current Outpatient Medications   Medication Sig Dispense Refill    amoxicillin (AMOXIL) 875 MG tablet TAKE 1 TABLET BY MOUTH TWICE DAILY      vitamin D (CHOLECALCIFEROL) 25 MCG (1000 UT) TABS tablet Take 1,000 Units by mouth daily      ascorbic acid (VITAMIN C) 500 MG tablet Take 500 mg by mouth daily      zinc 50 MG CAPS Take by mouth      OLIVE LEAF EXTRACT PO Take 1 tablet by mouth daily      losartan (COZAAR) 100 MG tablet TAKE 1 TABLET DAILY 90 tablet 3    amLODIPine (NORVASC) 10 MG tablet Take 1 tablet by mouth once daily 90 tablet 3    lidocaine (XYLOCAINE) 5 % ointment Apply topically as needed. 50 g 1    hydrocortisone (PROCTOZONE-HC) 2.5 % rectal cream APPLY ONE CREAM RECTALLY TWICE DAILY FOR  THE  NEXT  FEW  DAYS 3 Tube 3    Multiple Vitamins-Minerals (MULTIVITAMIN PO) Take by mouth daily      nystatin (MYCOSTATIN) 460553 UNIT/GM cream Apply topically as needed Apply topically 2 times daily. As needed      VIAGRA 100 MG TABS        No current facility-administered medications for this visit. Allergies  has No Known Allergies. Social History   reports that he has never smoked. He has never used smokeless tobacco. He reports that he does not drink alcohol and does not use drugs.   Health Screening Exams  Health Maintenance   Topic Date Due    Lipid screen  05/26/2022    Depression Screen  05/26/2022    Annual Wellness Visit (AWV)  05/27/2022    Potassium monitoring  07/01/2022    Creatinine monitoring  07/01/2022    A1C test (Diabetic or Prediabetic)  11/20/2022    Colorectal Cancer Screen  06/17/2023    DTaP/Tdap/Td vaccine (2 - Td or Tdap) 07/17/2025    Flu vaccine  Completed    Shingles Vaccine  Completed    Pneumococcal 65+ years Vaccine  Completed    COVID-19 Vaccine  Completed    Hepatitis C screen  Completed    Hepatitis A vaccine  Aged Out    Hepatitis B vaccine  Aged Out    Hib vaccine  Aged Out    Meningococcal (ACWY) vaccine  Aged Out     Review of Systems  History obtained from the patient. Constitutional: Denies any fever, chills, fatigue. Wound: Denies any rash, skin color changes or wound problems. Resp: Denies any cough, shortness of breath. CV: Denies any chest pain, orthopnea or syncope. GI: Denies any nausea, vomiting, blood in the stool, constipation or diarrhea. : Denies any hematuria, hesitancy or dysuria. OBJECTIVE    VITALS:  height is 5' 10\" (1.778 m) and weight is 229 lb 11.2 oz (104.2 kg). His temporal temperature is 97 °F (36.1 °C). His blood pressure is 128/70 and his pulse is 72. His respiration is 18 and oxygen saturation is 98%. Pain Score:   0 - No pain  CONSTITUTIONAL: Alert and oriented times 3, no acute distress and cooperative to examination. SKIN: Skin color, texture, turgor normal. No rashes or lesions. INCISION: wound margins intact and healing well. No signs of infection. No drainage. ABDOMEN: Soft, nondistended, no masses or organomegaly. Bowel sounds normal. right groin scar(s) present. No sign of recurrence, hematoma or seroma. Pain is in the medial thigh but not upon palpation. MALE : bilateral testes normal, no scrotal swelling or edema       Thank you for the interesting evaluation. Further recommendations as listed above.        Electronically signed by Neal Alegre DO on 4/19/2022 at 2:41 PM

## 2022-04-19 NOTE — LETTER
[place],   [address] [facility]. [address]  [place] [address]  [phone]     Pt Name: Lady Bee Record Number: [id number]  Date of Birth 1955   Today's Date: 4/19/2022    Parker Page was evaluated in the office today. My assessment and plans are listed below. ASSESSMENT      Diagnosis Orders   1. S/P right inguinal herniorrhaphy      2/17/22   2. Right groin pain     Incision is clean, dry and intact or healing as expected  PLANS:   Unable to palpate a recurrent hernia. US completed showing no evidence of recurrent hernia  Symptoms have only occurred 3 times in the past 2 weeks. Options of observation versus trigger point injection discussed with pt. AT this time, we hold off injections. Follow up: Return for As needed. Instructed to call if any concerns. If I can provide any additional assistance or you have any concerns, please feel free to contact me. Thank you for allowing to participate in the care of your patients. Sincerely,      Teo Pride.  Arya Zimmerman

## 2022-04-22 ENCOUNTER — APPOINTMENT (OUTPATIENT)
Dept: GENERAL RADIOLOGY | Age: 67
DRG: 313 | End: 2022-04-22
Payer: MEDICARE

## 2022-04-22 ENCOUNTER — HOSPITAL ENCOUNTER (INPATIENT)
Age: 67
LOS: 1 days | Discharge: HOME | DRG: 313 | End: 2022-04-23
Attending: STUDENT IN AN ORGANIZED HEALTH CARE EDUCATION/TRAINING PROGRAM | Admitting: NURSE PRACTITIONER
Payer: MEDICARE

## 2022-04-22 ENCOUNTER — OFFICE VISIT (OUTPATIENT)
Dept: FAMILY MEDICINE CLINIC | Age: 67
End: 2022-04-22
Payer: MEDICARE

## 2022-04-22 VITALS
TEMPERATURE: 97.8 F | HEIGHT: 69 IN | RESPIRATION RATE: 12 BRPM | OXYGEN SATURATION: 99 % | WEIGHT: 231 LBS | SYSTOLIC BLOOD PRESSURE: 136 MMHG | HEART RATE: 98 BPM | BODY MASS INDEX: 34.21 KG/M2 | DIASTOLIC BLOOD PRESSURE: 62 MMHG

## 2022-04-22 DIAGNOSIS — R07.9 CHEST PAIN, UNSPECIFIED TYPE: Primary | ICD-10-CM

## 2022-04-22 DIAGNOSIS — I10 ESSENTIAL HYPERTENSION: ICD-10-CM

## 2022-04-22 PROBLEM — G56.01 CARPAL TUNNEL SYNDROME OF RIGHT WRIST: Status: RESOLVED | Noted: 2017-09-25 | Resolved: 2022-04-22

## 2022-04-22 PROBLEM — G47.00 INSOMNIA: Status: RESOLVED | Noted: 2021-12-06 | Resolved: 2022-04-22

## 2022-04-22 PROBLEM — M79.641 RIGHT HAND PAIN: Status: RESOLVED | Noted: 2017-08-03 | Resolved: 2022-04-22

## 2022-04-22 LAB
ALBUMIN SERPL-MCNC: 4.3 G/DL (ref 3.5–5.1)
ALP BLD-CCNC: 86 U/L (ref 38–126)
ALT SERPL-CCNC: 22 U/L (ref 11–66)
ANION GAP SERPL CALCULATED.3IONS-SCNC: 14 MEQ/L (ref 8–16)
AST SERPL-CCNC: 20 U/L (ref 5–40)
BASOPHILS # BLD: 0.5 %
BASOPHILS ABSOLUTE: 0 THOU/MM3 (ref 0–0.1)
BILIRUB SERPL-MCNC: 0.2 MG/DL (ref 0.3–1.2)
BUN BLDV-MCNC: 13 MG/DL (ref 7–22)
CALCIUM SERPL-MCNC: 9.1 MG/DL (ref 8.5–10.5)
CHLORIDE BLD-SCNC: 99 MEQ/L (ref 98–111)
CO2: 25 MEQ/L (ref 23–33)
CREAT SERPL-MCNC: 0.7 MG/DL (ref 0.4–1.2)
EKG ATRIAL RATE: 75 BPM
EKG P AXIS: 21 DEGREES
EKG P-R INTERVAL: 158 MS
EKG Q-T INTERVAL: 364 MS
EKG QRS DURATION: 116 MS
EKG QTC CALCULATION (BAZETT): 406 MS
EKG R AXIS: -76 DEGREES
EKG T AXIS: 9 DEGREES
EKG VENTRICULAR RATE: 75 BPM
EOSINOPHIL # BLD: 1.5 %
EOSINOPHILS ABSOLUTE: 0.1 THOU/MM3 (ref 0–0.4)
ERYTHROCYTE [DISTWIDTH] IN BLOOD BY AUTOMATED COUNT: 12.8 % (ref 11.5–14.5)
ERYTHROCYTE [DISTWIDTH] IN BLOOD BY AUTOMATED COUNT: 43.5 FL (ref 35–45)
GFR SERPL CREATININE-BSD FRML MDRD: > 90 ML/MIN/1.73M2
GLUCOSE BLD-MCNC: 203 MG/DL (ref 70–108)
HCT VFR BLD CALC: 46 % (ref 42–52)
HEMOGLOBIN: 15.4 GM/DL (ref 14–18)
IMMATURE GRANS (ABS): 0.03 THOU/MM3 (ref 0–0.07)
IMMATURE GRANULOCYTES: 0.4 %
LIPASE: 30.5 U/L (ref 5.6–51.3)
LYMPHOCYTES # BLD: 25.5 %
LYMPHOCYTES ABSOLUTE: 2 THOU/MM3 (ref 1–4.8)
MCH RBC QN AUTO: 31.5 PG (ref 26–33)
MCHC RBC AUTO-ENTMCNC: 33.5 GM/DL (ref 32.2–35.5)
MCV RBC AUTO: 94.1 FL (ref 80–94)
MONOCYTES # BLD: 12.5 %
MONOCYTES ABSOLUTE: 1 THOU/MM3 (ref 0.4–1.3)
NUCLEATED RED BLOOD CELLS: 0 /100 WBC
OSMOLALITY CALCULATION: 281.6 MOSMOL/KG (ref 275–300)
PLATELET # BLD: 293 THOU/MM3 (ref 130–400)
PMV BLD AUTO: 10.3 FL (ref 9.4–12.4)
POTASSIUM REFLEX MAGNESIUM: 4.1 MEQ/L (ref 3.5–5.2)
PRO-BNP: 26.1 PG/ML (ref 0–900)
RBC # BLD: 4.89 MILL/MM3 (ref 4.7–6.1)
SEG NEUTROPHILS: 59.6 %
SEGMENTED NEUTROPHILS ABSOLUTE COUNT: 4.6 THOU/MM3 (ref 1.8–7.7)
SODIUM BLD-SCNC: 138 MEQ/L (ref 135–145)
TOTAL PROTEIN: 7.3 G/DL (ref 6.1–8)
TROPONIN T: < 0.01 NG/ML
TROPONIN T: < 0.01 NG/ML
WBC # BLD: 7.8 THOU/MM3 (ref 4.8–10.8)

## 2022-04-22 PROCEDURE — 36415 COLL VENOUS BLD VENIPUNCTURE: CPT

## 2022-04-22 PROCEDURE — 1123F ACP DISCUSS/DSCN MKR DOCD: CPT | Performed by: FAMILY MEDICINE

## 2022-04-22 PROCEDURE — 84484 ASSAY OF TROPONIN QUANT: CPT

## 2022-04-22 PROCEDURE — 99223 1ST HOSP IP/OBS HIGH 75: CPT

## 2022-04-22 PROCEDURE — 93000 ELECTROCARDIOGRAM COMPLETE: CPT | Performed by: FAMILY MEDICINE

## 2022-04-22 PROCEDURE — G0378 HOSPITAL OBSERVATION PER HR: HCPCS

## 2022-04-22 PROCEDURE — 71045 X-RAY EXAM CHEST 1 VIEW: CPT

## 2022-04-22 PROCEDURE — G8417 CALC BMI ABV UP PARAM F/U: HCPCS | Performed by: FAMILY MEDICINE

## 2022-04-22 PROCEDURE — 99285 EMERGENCY DEPT VISIT HI MDM: CPT

## 2022-04-22 PROCEDURE — 93005 ELECTROCARDIOGRAM TRACING: CPT | Performed by: PHYSICIAN ASSISTANT

## 2022-04-22 PROCEDURE — 1036F TOBACCO NON-USER: CPT | Performed by: FAMILY MEDICINE

## 2022-04-22 PROCEDURE — 83880 ASSAY OF NATRIURETIC PEPTIDE: CPT

## 2022-04-22 PROCEDURE — 83690 ASSAY OF LIPASE: CPT

## 2022-04-22 PROCEDURE — 96372 THER/PROPH/DIAG INJ SC/IM: CPT

## 2022-04-22 PROCEDURE — 4040F PNEUMOC VAC/ADMIN/RCVD: CPT | Performed by: FAMILY MEDICINE

## 2022-04-22 PROCEDURE — 99214 OFFICE O/P EST MOD 30 MIN: CPT | Performed by: FAMILY MEDICINE

## 2022-04-22 PROCEDURE — 93010 ELECTROCARDIOGRAM REPORT: CPT | Performed by: INTERNAL MEDICINE

## 2022-04-22 PROCEDURE — G8427 DOCREV CUR MEDS BY ELIG CLIN: HCPCS | Performed by: FAMILY MEDICINE

## 2022-04-22 PROCEDURE — 3017F COLORECTAL CA SCREEN DOC REV: CPT | Performed by: FAMILY MEDICINE

## 2022-04-22 PROCEDURE — 85025 COMPLETE CBC W/AUTO DIFF WBC: CPT

## 2022-04-22 PROCEDURE — 6360000002 HC RX W HCPCS

## 2022-04-22 PROCEDURE — 80053 COMPREHEN METABOLIC PANEL: CPT

## 2022-04-22 PROCEDURE — 6370000000 HC RX 637 (ALT 250 FOR IP): Performed by: STUDENT IN AN ORGANIZED HEALTH CARE EDUCATION/TRAINING PROGRAM

## 2022-04-22 RX ORDER — ACETAMINOPHEN 650 MG/1
650 SUPPOSITORY RECTAL EVERY 6 HOURS PRN
Status: DISCONTINUED | OUTPATIENT
Start: 2022-04-22 | End: 2022-04-23 | Stop reason: HOSPADM

## 2022-04-22 RX ORDER — MAGNESIUM SULFATE IN WATER 40 MG/ML
2000 INJECTION, SOLUTION INTRAVENOUS PRN
Status: DISCONTINUED | OUTPATIENT
Start: 2022-04-22 | End: 2022-04-23 | Stop reason: HOSPADM

## 2022-04-22 RX ORDER — SODIUM CHLORIDE 0.9 % (FLUSH) 0.9 %
10 SYRINGE (ML) INJECTION EVERY 12 HOURS SCHEDULED
Status: DISCONTINUED | OUTPATIENT
Start: 2022-04-22 | End: 2022-04-23 | Stop reason: HOSPADM

## 2022-04-22 RX ORDER — POLYETHYLENE GLYCOL 3350 17 G/17G
17 POWDER, FOR SOLUTION ORAL DAILY PRN
Status: DISCONTINUED | OUTPATIENT
Start: 2022-04-22 | End: 2022-04-23 | Stop reason: HOSPADM

## 2022-04-22 RX ORDER — ONDANSETRON 4 MG/1
4 TABLET, ORALLY DISINTEGRATING ORAL EVERY 8 HOURS PRN
Status: DISCONTINUED | OUTPATIENT
Start: 2022-04-22 | End: 2022-04-23 | Stop reason: HOSPADM

## 2022-04-22 RX ORDER — LANOLIN ALCOHOL/MO/W.PET/CERES
3 CREAM (GRAM) TOPICAL NIGHTLY PRN
Status: DISCONTINUED | OUTPATIENT
Start: 2022-04-23 | End: 2022-04-23 | Stop reason: HOSPADM

## 2022-04-22 RX ORDER — VITAMIN B COMPLEX
1000 TABLET ORAL DAILY
Status: DISCONTINUED | OUTPATIENT
Start: 2022-04-23 | End: 2022-04-23 | Stop reason: HOSPADM

## 2022-04-22 RX ORDER — AMLODIPINE BESYLATE 10 MG/1
10 TABLET ORAL DAILY
Status: DISCONTINUED | OUTPATIENT
Start: 2022-04-22 | End: 2022-04-22

## 2022-04-22 RX ORDER — SODIUM CHLORIDE 9 MG/ML
INJECTION, SOLUTION INTRAVENOUS PRN
Status: DISCONTINUED | OUTPATIENT
Start: 2022-04-22 | End: 2022-04-23 | Stop reason: HOSPADM

## 2022-04-22 RX ORDER — ACETAMINOPHEN 325 MG/1
650 TABLET ORAL EVERY 6 HOURS PRN
Status: DISCONTINUED | OUTPATIENT
Start: 2022-04-22 | End: 2022-04-23 | Stop reason: HOSPADM

## 2022-04-22 RX ORDER — MULTIVITAMIN WITH IRON
1 TABLET ORAL DAILY
Status: DISCONTINUED | OUTPATIENT
Start: 2022-04-23 | End: 2022-04-23 | Stop reason: HOSPADM

## 2022-04-22 RX ORDER — ENOXAPARIN SODIUM 100 MG/ML
40 INJECTION SUBCUTANEOUS DAILY
Status: DISCONTINUED | OUTPATIENT
Start: 2022-04-22 | End: 2022-04-23 | Stop reason: HOSPADM

## 2022-04-22 RX ORDER — POTASSIUM CHLORIDE 7.45 MG/ML
10 INJECTION INTRAVENOUS PRN
Status: DISCONTINUED | OUTPATIENT
Start: 2022-04-22 | End: 2022-04-23 | Stop reason: HOSPADM

## 2022-04-22 RX ORDER — AMLODIPINE BESYLATE 10 MG/1
10 TABLET ORAL DAILY
Status: DISCONTINUED | OUTPATIENT
Start: 2022-04-23 | End: 2022-04-23 | Stop reason: HOSPADM

## 2022-04-22 RX ORDER — ASPIRIN 81 MG/1
324 TABLET, CHEWABLE ORAL ONCE
Status: COMPLETED | OUTPATIENT
Start: 2022-04-22 | End: 2022-04-22

## 2022-04-22 RX ORDER — ONDANSETRON 2 MG/ML
4 INJECTION INTRAMUSCULAR; INTRAVENOUS EVERY 6 HOURS PRN
Status: DISCONTINUED | OUTPATIENT
Start: 2022-04-22 | End: 2022-04-23 | Stop reason: HOSPADM

## 2022-04-22 RX ORDER — LOSARTAN POTASSIUM 100 MG/1
100 TABLET ORAL DAILY
Status: DISCONTINUED | OUTPATIENT
Start: 2022-04-23 | End: 2022-04-23 | Stop reason: HOSPADM

## 2022-04-22 RX ORDER — POTASSIUM CHLORIDE 20 MEQ/1
40 TABLET, EXTENDED RELEASE ORAL PRN
Status: DISCONTINUED | OUTPATIENT
Start: 2022-04-22 | End: 2022-04-23 | Stop reason: HOSPADM

## 2022-04-22 RX ORDER — ASCORBIC ACID 500 MG
500 TABLET ORAL DAILY
Status: DISCONTINUED | OUTPATIENT
Start: 2022-04-23 | End: 2022-04-23 | Stop reason: HOSPADM

## 2022-04-22 RX ORDER — ZINC SULFATE 50(220)MG
50 CAPSULE ORAL DAILY
Status: DISCONTINUED | OUTPATIENT
Start: 2022-04-23 | End: 2022-04-23 | Stop reason: HOSPADM

## 2022-04-22 RX ORDER — MULTIVITAMIN WITH IRON
1 TABLET ORAL DAILY
Status: DISCONTINUED | OUTPATIENT
Start: 2022-04-22 | End: 2022-04-22

## 2022-04-22 RX ORDER — SODIUM CHLORIDE 0.9 % (FLUSH) 0.9 %
10 SYRINGE (ML) INJECTION PRN
Status: DISCONTINUED | OUTPATIENT
Start: 2022-04-22 | End: 2022-04-23 | Stop reason: HOSPADM

## 2022-04-22 RX ADMIN — ASPIRIN 81 MG 324 MG: 81 TABLET ORAL at 14:45

## 2022-04-22 RX ADMIN — ENOXAPARIN SODIUM 40 MG: 100 INJECTION SUBCUTANEOUS at 22:11

## 2022-04-22 ASSESSMENT — ENCOUNTER SYMPTOMS
DIARRHEA: 0
SORE THROAT: 0
VOMITING: 0
COUGH: 0
RHINORRHEA: 0
SINUS PAIN: 0
ABDOMINAL DISTENTION: 1
BACK PAIN: 0
NAUSEA: 0
ABDOMINAL PAIN: 0
SHORTNESS OF BREATH: 1
CHEST TIGHTNESS: 0
EYE REDNESS: 0
COUGH: 1
CONSTIPATION: 0

## 2022-04-22 ASSESSMENT — PAIN - FUNCTIONAL ASSESSMENT
PAIN_FUNCTIONAL_ASSESSMENT: NONE - DENIES PAIN
PAIN_FUNCTIONAL_ASSESSMENT: NONE - DENIES PAIN

## 2022-04-22 NOTE — H&P
Hospitalist - History & Physical      Patient: Mingo Camacho    Unit/Bed:24/024A  YOB: 1955  MRN: [id number]   Acct: [de-identified]   PCP: Lady Misha DO    Date of Service: Pt seen/examined on 04/22/22  and Admitted to Observation with expected LOS less than two midnights due to medical therapy. Chief Complaint:  Chest pain    Assessment and Plan:  1. Chest pain, possible unstable angina:    Pt has been asymptomatic since ED stay, however endorses intermittent, sharp substernal chest pain with activity that improves with rest. Initial troponin negative. EKG today notes NSR with Incomplete RBBB with left axis deviation, possible T-wave changes in inferior leads. No previous ECHO. Pt given ASA in ED, not given Nitro. Cardiology consulted. Repeat troponin x 1. Telemetry. ECHO ordered. Strict I&Os, low sodium diet. Repeat BMP and CBC in the AM. BNP ordered. 2. Essential HTN:    Appears overall controlled. Continue home meds. Continue to monitor BP. 3. BPH:    Stable. Continue home meds. 4. Hyperglycemia:    Glucose 203. Continue to monitor with daily BMP. Consider Low Dose SSI. History Of Present Illness: Lizbeth Feliciano is a 68-year-old  male with past medical history of BPH, HTN who presents to [address] [place] [address] ED today for evaluation of intermittent chest pain. Patient states that he was sent from his doctor's office today, for further work-up and evaluation. Patient reports last Wednesday riding the lawnmower in a very bumpy area of his lawn, and was jerked around quite a bit. He developed 1 episode of sharp chest pain following the event. The pain is located substernal, and is described as sharp, nonradiating. Patient states that he also has had 3 episodes today at work of [employer], sharp chest pain. Chest pain resolves with rest.  He states that he he thinks his chest pain is triggered by certain movements, but has been unable to reproduce it.   Patient has been free of chest pain since ED visit. Patient states he has also been experiencing abdominal distention, lower leg swelling, increased shortness of breath, shortness of breath while laying flat. He denies recent weight change, cough, abdominal pain, nausea, vomiting, fever, chills. Past Medical History:        Diagnosis Date    BPH with elevated PSA 7/17/2015    Follows with urology for this and elevated PSA    Dermatitis     follows with uriel, Dr. Clark Peak Erectile dysfunction     Hypertension     Obesity (BMI 30-39. 9)     Prediabetes 7/29/2016       Past Surgical History:        Procedure Laterality Date    COLONOSCOPY  2007    Dr. Maude Jovel N/A 2/17/2022    RIGHT INGUINAL HERNIA REPAIR WITH MESH performed by Mary Dooley DO at [address] [facility]  2005    Dr Ana Rosa Jamil ablation of prostate    PROSTATECTOMY N/A 07/01/2021    Robotic-assisted laparoscopic suprapubic prostatectomy @ [facility]       Home Medications:   No current facility-administered medications on file prior to encounter. Current Outpatient Medications on File Prior to Encounter   Medication Sig Dispense Refill    vitamin D (CHOLECALCIFEROL) 25 MCG (1000 UT) TABS tablet Take 1,000 Units by mouth daily      ascorbic acid (VITAMIN C) 500 MG tablet Take 500 mg by mouth daily      zinc 50 MG CAPS Take by mouth      OLIVE LEAF EXTRACT PO Take 1 tablet by mouth daily      losartan (COZAAR) 100 MG tablet TAKE 1 TABLET DAILY 90 tablet 3    amLODIPine (NORVASC) 10 MG tablet Take 1 tablet by mouth once daily 90 tablet 3    lidocaine (XYLOCAINE) 5 % ointment Apply topically as needed. 50 g 1    hydrocortisone (PROCTOZONE-HC) 2.5 % rectal cream APPLY ONE CREAM RECTALLY TWICE DAILY FOR  THE  NEXT  FEW  DAYS 3 Tube 3    Multiple Vitamins-Minerals (MULTIVITAMIN PO) Take by mouth daily      nystatin (MYCOSTATIN) 465878 UNIT/GM cream Apply topically as needed Apply topically 2 times daily.  As needed      VIAGRA 100 MG TABS          Allergies:    Patient has no known allergies. Social History:    reports that he has never smoked. He has never used smokeless tobacco. He reports that he does not drink alcohol and does not use drugs. Family History:       Problem Relation Age of Onset    High Blood Pressure Mother     Cancer Father         Lung Cancer    Colon Cancer Neg Hx     Prostate Cancer Neg Hx        Diet:  No diet orders on file    Review of systems:     Review of Systems   Constitutional: Negative for activity change, appetite change, chills, fatigue, fever and unexpected weight change. HENT: Negative for congestion, rhinorrhea and sore throat. Respiratory: Positive for cough and shortness of breath. Negative for chest tightness. Cardiovascular: Positive for chest pain and leg swelling. Negative for palpitations. Gastrointestinal: Positive for abdominal distention. Negative for abdominal pain, constipation, diarrhea, nausea and vomiting. Genitourinary: Negative for decreased urine volume and difficulty urinating. Neurological: Negative for dizziness, weakness, light-headedness, numbness and headaches. PHYSICAL EXAM:  BP (!) 156/88   Pulse 77   Temp 98.8 °F (37.1 °C) (Oral)   Resp 19   SpO2 100%   General appearance: No apparent distress. Appears stated age and cooperative. Skin: Skin color, texture, turgor normal.  No rashes or lesions. HEENT: Normal cephalic, atraumatic without obvious deformity. Pupils equal, round, and reactive to light. Extra-ocular muscles intact. Conjunctivae/corneas clear. Neck: Trachea midline. Supple, with full range of motion. No jugular venous distention. Cardiovascular: Regular rate and rhythm with normal S1/S2. No murmurs, rubs or gallops. Respiratory:  Normal respiratory effort. Clear to auscultation, bilaterally without rales, wheezes, or rhonchi. Abdomen: Protuberant abdomen. Soft, non-tender, non-distended. Normal bowel sounds. Musculoskeletal: Trace lower extremity edema noted bilaterally. No weakness or instability noted. No erythema, or gross deformity noted. Vascular: Pulses +2 palpable, equal bilaterally. Neurologic:  Neurovascularly intact without any focal sensory/motor deficits. Cranial nerves: II-XII grossly intact. Psychiatric: Anxious. Alert and oriented, thought content appropriate, normal insight      Labs:   Recent Labs     04/22/22  1332   WBC 7.8   HGB 15.4   HCT 46.0        Recent Labs     04/22/22  1332      K 4.1   CL 99   CO2 25   BUN 13   CREATININE 0.7   CALCIUM 9.1     Recent Labs     04/22/22  1332   AST 20   ALT 22   BILITOT 0.2*   ALKPHOS 86     No results for input(s): INR in the last 72 hours. No results for input(s): Elodia Jaylin in the last 72 hours. Urinalysis:    Lab Results   Component Value Date    NITRU Negative 08/21/2020    BLOODU Negative 08/21/2020    GLUCOSEU Negative 08/21/2020       Radiology:   XR CHEST PORTABLE   Final Result   1. Mild cardiomegaly. 2. No acute findings. No infiltrates or effusions are seen. **This report has been created using voice recognition software. It may contain minor errors which are inherent in voice recognition technology. **      Final report electronically signed by Dr. Ca Lyman on 4/22/2022 3:02 PM        XR CHEST PORTABLE    Result Date: 4/22/2022  PROCEDURE: XR CHEST PORTABLE CLINICAL INFORMATION: Chest pain. COMPARISON: 11/30/2019 TECHNIQUE: A single mobile view of the chest was obtained. 1. Mild cardiomegaly. 2. No acute findings. No infiltrates or effusions are seen. **This report has been created using voice recognition software. It may contain minor errors which are inherent in voice recognition technology. ** Final report electronically signed by Dr. Ca Lyman on 4/22/2022 3:02 PM        EKG: NSR, left axis deviation, incomplete RBBB.     Electronically signed by Sunny Pan PA-C on 4/22/2022 at 4:43 PM

## 2022-04-22 NOTE — ED NOTES
Patient resting in bed. Respirations easy and unlabored. No distress noted. Call light within reach.   No new complaints at this time  Pt updated on [facility] 05, [address] [facility]  04/22/22 2901

## 2022-04-22 NOTE — ED PROVIDER NOTES
[address] [address]          Pt Name: Kaci Rodriguez  MRN: [id number]  [place] 1955  Date of evaluation: 4/22/2022  Treating Resident Physician: Carmen Lim MD  Supervising Physician: Dr Chelita Carver       Chief Complaint   Patient presents with    Abdominal Pain     History obtained from the patient . HISTORY OF PRESENT ILLNESS    HPI  Kaci Rodriguez is a 74 y.o. male with past medical history of hypertension, obesity, who presents to the emergency department for evaluation of intermittent episodes of substernal chest pain beginning yesterday. However these episodes are worsening today becoming more persistent and they are exertional in nature. Denies any worsening with deep inspiration. Denies any nausea or vomiting associate with that but does mention having some slight shortness of breath associated with it. Denies any recent illnesses fever cough or chills    The patient has no other acute complaints at this time. REVIEW OF SYSTEMS   Review of Systems   Constitutional: Negative for chills and fever. HENT: Negative for rhinorrhea, sinus pain and sore throat. Eyes: Negative for redness. Respiratory: Positive for shortness of breath. Negative for cough. Cardiovascular: Positive for chest pain. Negative for leg swelling. Gastrointestinal: Negative for abdominal pain, diarrhea, nausea and vomiting. Genitourinary: Negative for dysuria. Musculoskeletal: Negative for back pain. Skin: Negative for rash. Neurological: Negative for light-headedness and headaches. Psychiatric/Behavioral: Negative for agitation. PAST MEDICAL AND SURGICAL HISTORY     Past Medical History:   Diagnosis Date    BPH with elevated PSA 7/17/2015    Follows with urology for this and elevated PSA    Dermatitis     follows with Dr. Momo trevino Erectile dysfunction     Hypertension     Obesity (BMI 30-39. 9)     Prediabetes 7/29/2016     Past Surgical History:   Procedure Laterality Date    COLONOSCOPY  2007    Dr. Carlos Bentley N/A 2/17/2022    RIGHT INGUINAL HERNIA REPAIR WITH MESH performed by Killian Diaz DO at [facility]  2005    Dr Lola Basurto ablation of prostate    PROSTATECTOMY N/A 07/01/2021    Robotic-assisted laparoscopic suprapubic prostatectomy @ [facility]         MEDICATIONS   No current facility-administered medications for this encounter. Current Outpatient Medications:     vitamin D (CHOLECALCIFEROL) 25 MCG (1000 UT) TABS tablet, Take 1,000 Units by mouth daily, Disp: , Rfl:     ascorbic acid (VITAMIN C) 500 MG tablet, Take 500 mg by mouth daily, Disp: , Rfl:     zinc 50 MG CAPS, Take by mouth, Disp: , Rfl:     OLIVE LEAF EXTRACT PO, Take 1 tablet by mouth daily, Disp: , Rfl:     losartan (COZAAR) 100 MG tablet, TAKE 1 TABLET DAILY, Disp: 90 tablet, Rfl: 3    amLODIPine (NORVASC) 10 MG tablet, Take 1 tablet by mouth once daily, Disp: 90 tablet, Rfl: 3    lidocaine (XYLOCAINE) 5 % ointment, Apply topically as needed. , Disp: 50 g, Rfl: 1    hydrocortisone (PROCTOZONE-HC) 2.5 % rectal cream, APPLY ONE CREAM RECTALLY TWICE DAILY FOR  THE  NEXT  FEW  DAYS, Disp: 3 Tube, Rfl: 3    Multiple Vitamins-Minerals (MULTIVITAMIN PO), Take by mouth daily, Disp: , Rfl:     nystatin (MYCOSTATIN) 822447 UNIT/GM cream, Apply topically as needed Apply topically 2 times daily.  As needed, Disp: , Rfl:     VIAGRA 100 MG TABS,   , Disp: , Rfl:       SOCIAL HISTORY     Social History     Social History Narrative    Not on file     Social History     Tobacco Use    Smoking status: Never Smoker    Smokeless tobacco: Never Used   Substance Use Topics    Alcohol use: No    Drug use: No         ALLERGIES   No Known Allergies      FAMILY HISTORY     Family History   Problem Relation Age of Onset    High Blood Pressure Mother     Cancer Father         Lung Cancer    Colon Cancer Neg Hx     Prostate Cancer Neg Hx          PREVIOUS RECORDS   Previous records reviewed: Patient was seen on 2/17/2022 for acute postoperative pain      PHYSICAL EXAM     ED Triage Vitals [04/22/22 1253]   BP Temp Temp Source Pulse Resp SpO2 Height Weight   122/80 99.1 °F (37.3 °C) Oral 101 16 99 % -- --     Initial vital signs and nursing assessment reviewed and abnormal from Tachycardia. Pulsoximetry is normal per my interpretation. Additional Vital Signs:  Vitals:    04/22/22 1446   BP: (!) 154/86   Pulse: 79   Resp: 19   Temp: 99.2 °F (37.3 °C)   SpO2: 100%       Physical Exam  Vitals and nursing note reviewed. Constitutional:       Appearance: He is not toxic-appearing or diaphoretic. HENT:      Head: Normocephalic and atraumatic. Right Ear: External ear normal.      Left Ear: External ear normal.      Nose: Nose normal.      Mouth/Throat:      Mouth: Mucous membranes are moist.      Pharynx: Oropharynx is clear. Eyes:      General: No scleral icterus. Conjunctiva/sclera: Conjunctivae normal.   Cardiovascular:      Rate and Rhythm: Normal rate and regular rhythm. Pulses: Normal pulses. Heart sounds: Normal heart sounds. Pulmonary:      Effort: Pulmonary effort is normal. No respiratory distress. Breath sounds: Normal breath sounds. Chest:      Chest wall: No tenderness. Abdominal:      General: Abdomen is flat. There is no distension. Palpations: Abdomen is soft. Tenderness: There is no abdominal tenderness. There is no guarding or rebound. Musculoskeletal:         General: Normal range of motion. Cervical back: Normal range of motion and neck supple. No rigidity. No muscular tenderness. Right lower leg: No edema. Left lower leg: No edema. Lymphadenopathy:      Cervical: No cervical adenopathy. Skin:     General: Skin is warm and dry. Capillary Refill: Capillary refill takes less than 2 seconds.       Coloration: Skin is not jaundiced. Neurological:      General: No focal deficit present. Mental Status: He is alert and oriented to person, place, and time. Psychiatric:         Mood and Affect: Mood normal.         Behavior: Behavior normal.           MEDICAL DECISION MAKING   Initial Assessment: There is a 70-year male with past medical history of hypertension, prediabetes, who presents with intermittent episodes of substernal chest pain that is sharp in nature nonradiating and exertional.  These episodes have become more persistent today and worsened with exertion more so today. He has some accompanying shortness of breath with these episodes. Denies any fever, chills, cough, nausea, diaphoresis    Differential Diagnosis Included but not limited to: ACS, musculoskeletal, pneumonia, pericarditis, pleurisy    MDM:   Patient has some T wave inversions in leads III, has some T wave abnormalities in some of the precordial leads that are new from prior EKGs when compared. Although his initial troponin is negative. His overall heart score is 6. He has not been seen by cardiologist in the past and has never undergone any stress testing. Will return to the hospital for admission for further cardiac work-up. ED RESULTS   Laboratory results:  Labs Reviewed   CBC WITH AUTO DIFFERENTIAL - Abnormal; Notable for the following components:       Result Value    MCV 94.1 (*)     All other components within normal limits   COMPREHENSIVE METABOLIC PANEL W/ REFLEX TO MG FOR LOW K - Abnormal; Notable for the following components:    Glucose 203 (*)     Total Bilirubin 0.2 (*)     All other components within normal limits   TROPONIN   LIPASE   ANION GAP   GLOMERULAR FILTRATION RATE, ESTIMATED   OSMOLALITY       Radiologic studies results:  XR CHEST PORTABLE   Final Result   1. Mild cardiomegaly. 2. No acute findings. No infiltrates or effusions are seen. **This report has been created using voice recognition software.   It may contain minor errors which are inherent in voice recognition technology. **      Final report electronically signed by Dr. Jaspal Linder on 4/22/2022 3:02 PM          ED Medications administered this visit:   Medications   aspirin chewable tablet 324 mg (324 mg Oral Given 4/22/22 1445)         ED COURSE     ED Course as of 04/22/22 1518   Fri Apr 22, 2022   1435 WBC: 7.8 [AL]   1435 Hemoglobin Quant: 15.4 [AL]   1435 Hematocrit: 46.0 [AL]   1435 Platelet Count: 870 [AL]   1435 Lipase: 30.5 [AL]   1435 GLUCOSE, FASTING,GF(!): 203 [AL]   1435 Potassium: 4.1 [AL]   1435 Creatinine: 0.7 [AL]   1435 Bilirubin(!): 0.2 [AL]   1501 Troponin T: < 0.010 [AL]   1506 Heart Score of 6. Patient will be updated and hospitalist will be contacted for admission. [AL]      ED Course User Index  [AL] Carmen Lim MD       MEDICATION CHANGES     New Prescriptions    No medications on file         FINAL DISPOSITION     Final diagnoses:   Chest pain, unspecified type     Condition: condition: good  Dispo: admit to tele      This transcription was electronically signed. Parts of this transcriptions may have been dictated by use of voice recognition software and electronically transcribed, and parts may have been transcribed with the assistance of an ED scribe. The transcription may contain errors not detected in proofreading. Please refer to my supervising physician's documentation if my documentation differs.     Electronically Signed: Carmen Lmi MD, 04/22/22, 3:18 PM         Carmen Lim MD  Resident  04/22/22 7273

## 2022-04-22 NOTE — PROGRESS NOTES
2020  Zoster - UTD shingrix x 2    PSA testing discussion - Follows with urology at [facility]    Lab Results   Component Value Date    PSA 1.38 (H) 04/02/2022    PSA 6.53 (H) 10/07/2020    PSA 5.84 (H) 08/02/2019     AAA Screening - never smoker       Current Outpatient Medications   Medication Sig Dispense Refill    vitamin D (CHOLECALCIFEROL) 25 MCG (1000 UT) TABS tablet Take 1,000 Units by mouth daily      ascorbic acid (VITAMIN C) 500 MG tablet Take 500 mg by mouth daily      zinc 50 MG CAPS Take by mouth      OLIVE LEAF EXTRACT PO Take 1 tablet by mouth daily      losartan (COZAAR) 100 MG tablet TAKE 1 TABLET DAILY 90 tablet 3    amLODIPine (NORVASC) 10 MG tablet Take 1 tablet by mouth once daily 90 tablet 3    lidocaine (XYLOCAINE) 5 % ointment Apply topically as needed. 50 g 1    hydrocortisone (PROCTOZONE-HC) 2.5 % rectal cream APPLY ONE CREAM RECTALLY TWICE DAILY FOR  THE  NEXT  FEW  DAYS 3 Tube 3    Multiple Vitamins-Minerals (MULTIVITAMIN PO) Take by mouth daily      nystatin (MYCOSTATIN) 030419 UNIT/GM cream Apply topically as needed Apply topically 2 times daily. As needed      VIAGRA 100 MG TABS        No current facility-administered medications for this visit. No orders of the defined types were placed in this encounter. All medications reviewed and reconciled, including OTC and herbal medications. Updated list given to patient. Patient Active Problem List   Diagnosis    Dermatitis    Hypertension    Erectile dysfunction    BPH with elevated PSA    Prediabetes    Obesity (BMI 30-39. 9)       Past Medical History:   Diagnosis Date    BPH with elevated PSA 7/17/2015    Follows with urology for this and elevated PSA    Dermatitis     follows with Dr. Amber trevino [place] Erectile dysfunction     Hypertension     Obesity (BMI 30-39. 9)     Prediabetes 7/29/2016       Past Surgical History:   Procedure Laterality Date    COLONOSCOPY  2007    Dr. Mello Chacon 2/17/2022    RIGHT INGUINAL HERNIA REPAIR WITH MESH performed by Shira Perez DO at [facility]  2005    Dr Domi Villar laser ablation of prostate    PROSTATECTOMY N/A 07/01/2021    Robotic-assisted laparoscopic suprapubic prostatectomy @ [facility]         No Known Allergies      Social History     Tobacco Use    Smoking status: Never Smoker    Smokeless tobacco: Never Used   Substance Use Topics    Alcohol use: No       Family History   Problem Relation Age of Onset    High Blood Pressure Mother     Cancer Father         Lung Cancer    Colon Cancer Neg Hx     Prostate Cancer Neg Hx          I have reviewed the patient's past medical history, past surgical history, allergies, medications, social and family history and I have made updates where appropriate. Review of Systems  Positive responses are highlighted in bold    Constitutional:  Fever, Chills, Night Sweats, Fatigue, Unexpected changes in weight  HENT:  Ear pain, Tinnitus, Nosebleeds, Trouble swallowing, Hearing loss, Sore throat  Cardiovascular:  Chest Pain, Palpitations, Orthopnea, Paroxysmal Nocturnal Dyspnea  Respiratory:  Cough, Wheezing, Shortness of breath, Chest tightness, Apnea  Gastrointestinal:  Nausea, Vomiting, Diarrhea, Constipation, Heartburn, Blood in stool  Genitourinary:  Difficulty or painful urination, Flank pain, Change in frequency, Urgency  Skin:  Color change, Rash, Itching, Wound  Musculoskeletal:  Joint pain, Back pain, Gait problems, Joint swelling, Myalgias  Neurological:  Dizziness, Headaches, Presyncope, Numbness, Seizures, Tremors  Endocrine:  Heat Intolerance, Cold Intolerance, Polydipsia, Polyphagia, Polyuria      PHYSICAL EXAM:  Vitals:    04/22/22 1149   BP: 136/62   Pulse: 98   Resp: 12   Temp: 97.8 °F (36.6 °C)   TempSrc: Oral   SpO2: 99%   Weight: 231 lb (104.8 kg)   Height: 5' 9\" (1.753 m)     Body mass index is 34.11 kg/m².   Pain Score:   0 - No pain    VS Reviewed  General Appearance: A&O x 3, No acute distress,well developed and well- nourished  Eyes: pupils equal, round, and reactive to light, extraocular eye movements intact, conjunctivae and eye lids without erythema  ENT: external ear and ear canal clear bilaterally, TMs intact and regular, nose without deformity, nasal mucosa and turbinates normal without polyps, oropharynx normal, dentition is normal for age  Neck: supple and non-tender without mass, no thyromegaly or thyroid nodules, no cervical lymphadenopathy  Pulmonary/Chest: clear to auscultation bilaterally- no wheezes, rales or rhonchi, normal air movement, no respiratory distress or retractions. No Chest wall TTP. Cardiovascular: S1 and S2 auscultated w/ RRR. No murmurs, rubs, clicks, or gallops, distal pulses intact. Abdomen: soft, non-tender, non-distended, bowel sounds physiologic,  no rebound or guarding, no masses or hernias noted. Liver and spleen without enlargement. Extremities: no cyanosis, clubbing or edema of the lower extremities. Skin: warm and dry, no rash or erythema      EKG: NSR, Left axis deviation with LAFB      ASSESSMENT & PLAN  1. Chest pain, unspecified type    Unclear etiology  I think, ultimately, this will be found to be MSK in nature    However, does have some features that could be c/w ischemic type CP and could be unstable angina presentation  Has EKG with new LAFB compared to EKG report from [facility] (I only have access to the report, not the tracing)  Has exertional component to his CP  Has RF including age, gender, pre DM and HTN    I can't comfortably saw this is not an evolving cardiac process with those variables  Will have him present to ER from office, report called to ED  Will await ER w/u and f/u with him based on their dispo    - EKG 12 Lead    2. Essential hypertension    BP's stable today  con't Cozaar/Norvasc      DISPOSITION    No follow-ups on file. Maximino Asia released without restrictions.     Future Appointments   Date Time Provider Ju [place]   8/24/2022  9:00 AM Patricio DELGADO [address] [place] [address]     PATIENT COUNSELING    Barriers to learning and self management: none    Discussed use, benefit, and side effects of prescribed medications. Barriers to medication compliance addressed. All patient questions answered. Pt voiced understanding.        Electronically signed by Loreda Heimlich, DO on 4/22/2022 at 1:59 PM

## 2022-04-22 NOTE — ED NOTES
Patient resting in bed. Respirations easy and unlabored. No distress noted. Call light within reach.   Pt has no new complaints at this time  Awaiting IP bed     Jaron Mancilla RN  04/22/22 6124

## 2022-04-22 NOTE — PROGRESS NOTES
Pharmacy Medication History Note      List of current medications patient is taking is complete. Source of information: patient & patient's wife    Changes made to medication list:  Medications removed (include reason, ex. therapy complete or physician discontinued):  Hydrocortisone 2.5% rectal cream, no longer using  Multivitamin, no longer taking  Olive Leaf extract, no longer taking    Medications added/doses adjusted:  Calcium 600mg BID    Other notes (ex. Recent course of antibiotics, Coumadin dosing):  Lidocaine ointment, nystatin cream, and Viagra are all used PRN only. Denies use of other OTC or herbal medications.       Allergies reviewed      Electronically signed by Kar Wallace [facility] on 4/22/2022 at 6:09 PM

## 2022-04-22 NOTE — ED NOTES
ED to inpatient nurses report    Chief Complaint   Patient presents with    Abdominal Pain      Present to ED from home  LOC: alert and orientated to name, place, date  Vital signs   Vitals:    04/22/22 1253 04/22/22 1446   BP: 122/80 (!) 154/86   Pulse: 101 79   Resp: 16 19   Temp: 99.1 °F (37.3 °C) 99.2 °F (37.3 °C)   TempSrc: Oral Oral   SpO2: 99% 100%      Oxygen Baseline     Current needs required RA   LDAs:   Peripheral IV 02/17/22 Right Hand (Active)     Mobility: Independent  Pending ED orders: none  Present condition: stable      Electronically signed by Ketan Santana RN on 4/22/2022 at 3:33 PM       Ketan Santana RN  04/22/22 1536

## 2022-04-22 NOTE — ED NOTES
Patient resting in bed. Respirations easy and unlabored. No distress noted. Call light within reach.   No new complaints at this time     Kyle Case RN  04/22/22 2262

## 2022-04-22 NOTE — ED NOTES
Pt to the ED via self. Patient presents with complaints of sharp abdominal pain in the epigastric region. Patient states that he was seen by his family doctor already but was sent here just to be sure. Patient is alert and oriented x 4. Respirations are regular and unlabored.      Bunnie Hammans, RN  04/22/22 3024

## 2022-04-23 VITALS
TEMPERATURE: 98.1 F | SYSTOLIC BLOOD PRESSURE: 137 MMHG | DIASTOLIC BLOOD PRESSURE: 69 MMHG | WEIGHT: 226.63 LBS | RESPIRATION RATE: 16 BRPM | BODY MASS INDEX: 33.47 KG/M2 | HEART RATE: 68 BPM | OXYGEN SATURATION: 97 %

## 2022-04-23 LAB
ANION GAP SERPL CALCULATED.3IONS-SCNC: 11 MEQ/L (ref 8–16)
AVERAGE GLUCOSE: 132 MG/DL (ref 70–126)
BASOPHILS # BLD: 0.4 %
BASOPHILS ABSOLUTE: 0 THOU/MM3 (ref 0–0.1)
BUN BLDV-MCNC: 12 MG/DL (ref 7–22)
CALCIUM SERPL-MCNC: 8.4 MG/DL (ref 8.5–10.5)
CHLORIDE BLD-SCNC: 100 MEQ/L (ref 98–111)
CHOLESTEROL, TOTAL: 166 MG/DL (ref 100–199)
CO2: 25 MEQ/L (ref 23–33)
CREAT SERPL-MCNC: 0.6 MG/DL (ref 0.4–1.2)
EKG ATRIAL RATE: 77 BPM
EKG P AXIS: 72 DEGREES
EKG P-R INTERVAL: 164 MS
EKG Q-T INTERVAL: 368 MS
EKG QRS DURATION: 100 MS
EKG QTC CALCULATION (BAZETT): 416 MS
EKG R AXIS: -78 DEGREES
EKG T AXIS: 79 DEGREES
EKG VENTRICULAR RATE: 77 BPM
EOSINOPHIL # BLD: 2 %
EOSINOPHILS ABSOLUTE: 0.2 THOU/MM3 (ref 0–0.4)
ERYTHROCYTE [DISTWIDTH] IN BLOOD BY AUTOMATED COUNT: 12.8 % (ref 11.5–14.5)
ERYTHROCYTE [DISTWIDTH] IN BLOOD BY AUTOMATED COUNT: 43.4 FL (ref 35–45)
GFR SERPL CREATININE-BSD FRML MDRD: > 90 ML/MIN/1.73M2
GLUCOSE BLD-MCNC: 133 MG/DL (ref 70–108)
HBA1C MFR BLD: 6.4 % (ref 4.4–6.4)
HCT VFR BLD CALC: 45 % (ref 42–52)
HDLC SERPL-MCNC: 33 MG/DL
HEMOGLOBIN: 14.9 GM/DL (ref 14–18)
IMMATURE GRANS (ABS): 0.03 THOU/MM3 (ref 0–0.07)
IMMATURE GRANULOCYTES: 0.4 %
LDL CHOLESTEROL CALCULATED: 108 MG/DL
LV EF: 67 %
LVEF MODALITY: NORMAL
LYMPHOCYTES # BLD: 23.9 %
LYMPHOCYTES ABSOLUTE: 2 THOU/MM3 (ref 1–4.8)
MCH RBC QN AUTO: 30.9 PG (ref 26–33)
MCHC RBC AUTO-ENTMCNC: 33.1 GM/DL (ref 32.2–35.5)
MCV RBC AUTO: 93.4 FL (ref 80–94)
MONOCYTES # BLD: 11.9 %
MONOCYTES ABSOLUTE: 1 THOU/MM3 (ref 0.4–1.3)
NUCLEATED RED BLOOD CELLS: 0 /100 WBC
PLATELET # BLD: 259 THOU/MM3 (ref 130–400)
PMV BLD AUTO: 10 FL (ref 9.4–12.4)
POTASSIUM REFLEX MAGNESIUM: 4 MEQ/L (ref 3.5–5.2)
RBC # BLD: 4.82 MILL/MM3 (ref 4.7–6.1)
SEG NEUTROPHILS: 61.4 %
SEGMENTED NEUTROPHILS ABSOLUTE COUNT: 5.1 THOU/MM3 (ref 1.8–7.7)
SODIUM BLD-SCNC: 136 MEQ/L (ref 135–145)
TRIGL SERPL-MCNC: 126 MG/DL (ref 0–199)
TROPONIN T: < 0.01 NG/ML
WBC # BLD: 8.3 THOU/MM3 (ref 4.8–10.8)

## 2022-04-23 PROCEDURE — 93010 ELECTROCARDIOGRAM REPORT: CPT | Performed by: INTERNAL MEDICINE

## 2022-04-23 PROCEDURE — G0378 HOSPITAL OBSERVATION PER HR: HCPCS

## 2022-04-23 PROCEDURE — A9500 TC99M SESTAMIBI: HCPCS | Performed by: NURSE PRACTITIONER

## 2022-04-23 PROCEDURE — 36415 COLL VENOUS BLD VENIPUNCTURE: CPT

## 2022-04-23 PROCEDURE — 83036 HEMOGLOBIN GLYCOSYLATED A1C: CPT

## 2022-04-23 PROCEDURE — 78452 HT MUSCLE IMAGE SPECT MULT: CPT

## 2022-04-23 PROCEDURE — 85025 COMPLETE CBC W/AUTO DIFF WBC: CPT

## 2022-04-23 PROCEDURE — 99239 HOSP IP/OBS DSCHRG MGMT >30: CPT | Performed by: NURSE PRACTITIONER

## 2022-04-23 PROCEDURE — 99223 1ST HOSP IP/OBS HIGH 75: CPT | Performed by: INTERNAL MEDICINE

## 2022-04-23 PROCEDURE — 3430000000 HC RX DIAGNOSTIC RADIOPHARMACEUTICAL: Performed by: NURSE PRACTITIONER

## 2022-04-23 PROCEDURE — 1200000000 HC SEMI PRIVATE

## 2022-04-23 PROCEDURE — 84484 ASSAY OF TROPONIN QUANT: CPT

## 2022-04-23 PROCEDURE — 6360000002 HC RX W HCPCS

## 2022-04-23 PROCEDURE — 96372 THER/PROPH/DIAG INJ SC/IM: CPT

## 2022-04-23 PROCEDURE — 93017 CV STRESS TEST TRACING ONLY: CPT | Performed by: INTERNAL MEDICINE

## 2022-04-23 PROCEDURE — 80048 BASIC METABOLIC PNL TOTAL CA: CPT

## 2022-04-23 PROCEDURE — 6370000000 HC RX 637 (ALT 250 FOR IP): Performed by: NURSE PRACTITIONER

## 2022-04-23 PROCEDURE — 80061 LIPID PANEL: CPT

## 2022-04-23 PROCEDURE — 6370000000 HC RX 637 (ALT 250 FOR IP)

## 2022-04-23 PROCEDURE — 93005 ELECTROCARDIOGRAM TRACING: CPT | Performed by: INTERNAL MEDICINE

## 2022-04-23 RX ORDER — SODIUM CHLORIDE 9 MG/ML
500 INJECTION, SOLUTION INTRAVENOUS CONTINUOUS PRN
Status: CANCELLED | OUTPATIENT
Start: 2022-04-23 | End: 2022-04-23

## 2022-04-23 RX ORDER — ATROPINE SULFATE 0.1 MG/ML
0.5 INJECTION INTRAVENOUS EVERY 5 MIN PRN
Status: CANCELLED | OUTPATIENT
Start: 2022-04-23 | End: 2022-04-23

## 2022-04-23 RX ORDER — NITROGLYCERIN 0.4 MG/1
0.4 TABLET SUBLINGUAL EVERY 5 MIN PRN
Status: CANCELLED | OUTPATIENT
Start: 2022-04-23 | End: 2022-04-23

## 2022-04-23 RX ORDER — ASPIRIN 81 MG/1
81 TABLET, CHEWABLE ORAL DAILY
Status: DISCONTINUED | OUTPATIENT
Start: 2022-04-23 | End: 2022-04-23 | Stop reason: HOSPADM

## 2022-04-23 RX ORDER — ALBUTEROL SULFATE 90 UG/1
2 AEROSOL, METERED RESPIRATORY (INHALATION) PRN
Status: CANCELLED | OUTPATIENT
Start: 2022-04-23 | End: 2022-04-23

## 2022-04-23 RX ORDER — SODIUM CHLORIDE 0.9 % (FLUSH) 0.9 %
5-40 SYRINGE (ML) INJECTION PRN
Status: CANCELLED | OUTPATIENT
Start: 2022-04-23 | End: 2022-04-23

## 2022-04-23 RX ORDER — ASPIRIN 81 MG/1
81 TABLET, CHEWABLE ORAL DAILY
Qty: 30 TABLET | Refills: 3 | COMMUNITY
Start: 2022-04-24

## 2022-04-23 RX ORDER — METOPROLOL TARTRATE 5 MG/5ML
5 INJECTION INTRAVENOUS EVERY 5 MIN PRN
Status: CANCELLED | OUTPATIENT
Start: 2022-04-23 | End: 2022-04-23

## 2022-04-23 RX ADMIN — Medication 50 MG: at 12:56

## 2022-04-23 RX ADMIN — ASPIRIN 81 MG 81 MG: 81 TABLET ORAL at 12:30

## 2022-04-23 RX ADMIN — Medication 1 TABLET: at 12:57

## 2022-04-23 RX ADMIN — AMLODIPINE BESYLATE 10 MG: 10 TABLET ORAL at 12:56

## 2022-04-23 RX ADMIN — LOSARTAN POTASSIUM 100 MG: 100 TABLET, FILM COATED ORAL at 12:56

## 2022-04-23 RX ADMIN — Medication 35 MILLICURIE: at 11:00

## 2022-04-23 RX ADMIN — Medication 11 MILLICURIE: at 09:35

## 2022-04-23 RX ADMIN — ENOXAPARIN SODIUM 40 MG: 100 INJECTION SUBCUTANEOUS at 12:30

## 2022-04-23 RX ADMIN — OXYCODONE HYDROCHLORIDE AND ACETAMINOPHEN 500 MG: 500 TABLET ORAL at 12:56

## 2022-04-23 RX ADMIN — Medication 1000 UNITS: at 12:56

## 2022-04-23 NOTE — CONSULTS
[address] [place] [address]                                  CONSULTATION    PATIENT NAME: Kayla Horn                     :        1955  MED REC NO:   [id number]                           ROOM:       [id number]  ACCOUNT NO:   [de-identified]                           ADMIT DATE: 2022  PROVIDER:     Liana Kaba. LASHON [place] [employer]:  2022    REASON FOR CONSULTATION:  Presentation with chest pain. HISTORY OF PRESENT ILLNESS:  This is a 67-year-old  gentleman  who was in his usual state of health until four days ago when he started  to have chest pain following ride mower going on a bumpy lawn. The  patient is known to have a history of BPH and hypertension. He mowed on  a bumpy area on Wednesday and he was shaking a lot during the ride. After completing the mowing, a few minutes later, he started to have one  episode of sharp pain and it was retrosternal, sharp in nature, 5 to 6  out of 10, non-radiating, no associated shortness of breath,  palpitations, nausea or vomiting, lasted a few seconds to a minute and  resolved on its own. Then, the next day, he did not have any pain and  was okay, but after that, he had one episode of pain, also that lasted a  few seconds to a minute. Yesterday in the morning, he had one episode  of also sharp pain that lasted a few seconds to a minute. In this,  there was basically turning or rolling over. Turning to the left or to  the right caused the pain and only for a few seconds and then it was  gone and sometimes when he coughs. He has been coughing the last few  days and that induced. Otherwise, not exertion related and it lasted  only a few seconds to a minute. In view of that, he went to his family  doctor and was sent to the emergency room because of chest pain and  abnormal EKG. Now, chest pain free overnight. Two sets of troponin are  negative.   EKG abnormal, but right bundle branch incomplete. No  previous history of chest pain and no known history of coronary artery  disease. REVIEW OF SYSTEMS:  Negative except the above-mentioned ones. PAST MEDICAL HISTORY:  Includes BPH, hypertension, erectile dysfunction. PAST SURGICAL HISTORY:  Colonoscopy, right inguinal hernia repair,  prostate surgery, and robotic-assisted laparoscopic suprapubic  prostatectomy. FAMILY HISTORY:  There is no family history of coronary artery disease  but of hypertension. SOCIAL HISTORY:  He has never smoked. No alcohol. No drug use. ALLERGIES:  NO KNOWN DRUG ALLERGIES. HOME MEDICATIONS:  Prior to this admission include vitamin D, vitamin C,  losartan, Norvasc, Xylocaine, Mycostatin, and Viagra. PHYSICAL EXAMINATION:  GENERAL:  Looks sick, in no form of distress. VITAL SIGNS:  Blood pressure 142/83, pulse rate 70, respiratory rate 16,  temperature 98. 2. HEENT:  Pink conjunctivae. Anicteric sclerae. Pupils are equal and  reactive bilaterally to light. NECK:  No lymphadenopathy. No goiter. No JVD. CHEST:  Clear to auscultation and resonant to percussion. No chest wall  tenderness. CARDIOVASCULAR:  Arteries are felt; carotid, femoral, and pedal.  No  bruits. S1, S2 well heard. No murmur or galloped rhythm. ABDOMEN:  Soft, nontender, and nondistended. Bowel sounds are positive. GENITALIA:  No CVA, flank or suprapubic tenderness. LOCOMOTOR:  No cyanosis, clubbing, or muscle or joint tenderness. SKIN:  No rashes, ulcers, or nodules. CNS:  Alert, awake, and oriented to time, person, and place. Cranial  nerves are intact. Sensation is intact to light touch and pain. Motor:  Normal muscle strength and tone. Appropriate mood and affect. WORKUP:  EKG:  Sinus rhythm with left axis deviation with left anterior  hemiblock and incomplete right bundle branch block. No significant  change when compared to the previous recent EKG that the patient has.    In the doctor's office, he had an EKG with basically similar findings. LABORATORY DATA:  Blood Chemistry:  Sodium 136, potassium 4, BUN 12,  creatinine 0.6. Troponin less than 0.01. Hematology:  White blood cell  8.3, hemoglobin 14.9, and platelets 727. Chest X-Ray:  Mild  cardiomegaly, no acute findings. No other tests done. Otherwise, all  other tests are not revealing. Troponins were x2 done. ASSESSMENT:  1. Chest pain, atypical in nature, only a few seconds, this was  positional and cough-induced. 2.  Abnormal EKG with incomplete right bundle branch block, left  anterior hemiblock. 3.  Hypertension, well controlled, stable. 4.  History of benign prostatic hypertrophy, status post prostatectomy. 5.  History of inguinal hernia repair. 6.  Non-smoker. No family history of coronary artery disease. PLAN AND RECOMMENDATIONS:  1. At this level, I will do a functional study. I will check one set  of troponin, one more EKG. If they also remain to be negative, we will  do functional study, exercise nuclear stress test, get an echocardiogram  and based on that, we will gauge further care. He had a lipid panel  done in 2021, that was in acceptable range. 2.  Based on the course and findings from the above tests, we will gauge  further care. Thank you for allowing me to participate in the care of this patient. I  will follow up with you. Ashlyn Grant.  Miranda Frey M.D.    D: 04/23/2022 8:58:03       T: 04/23/2022 10:06:33     HARSHA/MARY CARMEN_JONAH  Job#: [id number]     Doc#: [id number]    CC:

## 2022-04-23 NOTE — ED PROVIDER NOTES
[facility] [facility]  EMERGENCY MEDICINE ATTENDING ATTESTATION      Evaluation of Ashlee Gamez. Case discussed and care plan developed with resident physician. I agree with the resident physician documentation and plan as documented by him, except if my documentation differs. Patient seen, interviewed and examined by me. I reviewed the medical, surgical, family and social history, medications and allergies. I have reviewed the nursing documentation. I have reviewed the patient's vital signs and are normal per my interpretation. There is no height or weight on file to calculate BMI. Pulsoxymetry is normal per my interpretation. Brief H&P   Patient c/o exertional chest pain that resolves with rest for the past 2 days, questionable epigastric abdominal pain versus substernal chest pain. Was seen at his PCPs office but sent to the emergency department for further evaluation. Patient with no chest pain while in the emergency department    Physical exam is notable for well appearing, lungs clear, heart regular rate and rhythm, no abdominal tenderness or reproducible chest pain      Medical Decision Making   MDM:   Patient is a 67-year-old male who presents with intermittent exertional chest pain for the past 2 days which is not present at this time. Patient had EKG performed earlier today at PCPs office and repeat EKG in the emergency department shows T wave inversions in leads III and in some precordial leads that were not present on EKG performed earlier today. Patient with a heart score of 6. Will admit for further cardiac work-up. Plan:    Admit for cardiac work-up    Please see the resident physician completed note for final disposition except as documented on this attestation. I have reviewed and interpreted all available lab, radiology and ekg results available at the moment. Diagnosis, treatment and disposition plans were discussed and agreed upon by patient. This transcription was electronically signed. It was dictated by use of voice recognition software and electronically transcribed. The transcription may contain errors not detected in proofreading.      I performed direct supervision and was present for the critical portion following procedures: None  Critical care time on this case: None    Electronically signed by Esa Miller MD on 4/22/22 at 8:54 PM EDT        Esa Miller MD  04/22/22 7370

## 2022-04-23 NOTE — PLAN OF CARE
Problem: Discharge Planning  Goal: Discharge to home or other facility with appropriate resources  Note: Feedback readiness for discharge. Promoting inclusion for discharge planning. Problem: Safety - Adult  Goal: Free from fall injury  Note:   Bed in low position  Call light in reach  Bed wheel lock  Teaching to use call light for assistance. Pain Assessment: None - Denies Pain          Is pain goal met at this time? No  Care plan reviewed with patient. Patient verbalizes understanding of the plan of care and contributes to goal setting.

## 2022-04-23 NOTE — DISCHARGE SUMMARY
Hospital Medicine Discharge Summary      Patient Identification:   Nhi Jeong   : 1955  MRN: [id number]   Account: [de-identified]      Patient's PCP: Fozia Patel DO    Admit Date: 2022     Discharge Date:   2022    Admitting Physician: No admitting provider for patient encounter. Discharging Nurse Practitioner: Kamilla Sandoval, APRN - CNP     Discharge Diagnoses with Assessment/Plan:  1. Acute chest pain, etiology possibly musculoskeletal--ACS ruled out by 3 (-) troponins; aspirin 81 mg daily, lipid panel noted and at this time patient does not want to be on medication he wants to improve his diet and exercise; EKG showing sinus rhythm, left ventricular fascicular block, heart rate 77 with inverted T waves in aVL; appreciate cardiology input; stress test did not reveal ischemia; outpatient echocardiogram  2. Essential hypertension, uncontrolled--on Norvasc, Cozaar;  stable  3. Hyperglycemia, acute--likely secondary to nonfasting as patient stated that he had a donut yesterday morning, hemoglobin A1c noted be 6.4 today  4. BPH--not on any medications  5. Obesity with BMI 33.47    The patient was seen and examined on day of discharge and this discharge summary is in conjunction with any daily progress note from day of discharge.     Hospital Course:   Nhi Jeong is a 74 y.o. male admitted to [address] [place] [address] on 2022 for chest pain; Per H&P dated : \"Curtis is a 47-year-old  male with past medical history of BPH, HTN who presents to [facility] ED today for evaluation of intermittent chest pain.  Patient states that he was sent from his doctor's office today, for further work-up and evaluation.  Patient reports last Wednesday riding the lawnmower in a very bumpy area of his lawn, and was jerked around quite a bit. [facility] developed 1 episode of sharp chest pain following the event.  The pain is located substernal, and is described as sharp, nonradiating.  Patient states that he also has had 3 episodes today at work of [employer], sharp chest pain.  Chest pain resolves with rest. Hannah Martin states that he he thinks his chest pain is triggered by certain movements, but has been unable to reproduce it.  Patient has been free of chest pain since ED visit. Helen Thornton states he has also been experiencing abdominal distention, lower leg swelling, increased shortness of breath, shortness of breath while laying flat.  He denies recent weight change, cough, abdominal pain, nausea, vomiting, fever, chills. \"     4/23--> hemodynamically stable; patient again relates that he was mowing his yard Wednesday morning with a zero turn mower and it was very rough and it was jerking him around he developed chest pain, states the pain has not been there since being here at the hospital; stress test was done did not reveal ischemia, patient remains pain-free without any complaints at all; he continues to be hemodynamically stable, he really wants to go home, wife at bedside, plan discharge home today with outpatient echocardiogram and follow-up with PCP and cardiology             Exam:     Vitals:  Vitals:    04/23/22 0015 04/23/22 0308 04/23/22 0600 04/23/22 0845   BP:  (!) 142/83  137/69   Pulse: 74 78  68   Resp: 16 16 16 16   Temp: 98.1 °F (36.7 °C) 98.2 °F (36.8 °C)  98.1 °F (36.7 °C)   TempSrc: Oral Oral  Oral   SpO2: 94% 96%  97%   Weight: 226 lb 10.1 oz (102.8 kg)        Weight: Weight: 226 lb 10.1 oz (102.8 kg)     24 hour intake/output:    Intake/Output Summary (Last 24 hours) at 4/23/2022 1458  Last data filed at 4/22/2022 2052  Gross per 24 hour   Intake 1000 ml   Output --   Net 1000 ml         General appearance:  No apparent distress, appears stated age and cooperative. HEENT:  Normal cephalic, atraumatic without obvious deformity. Pupils equal, round, and reactive to light. Conjunctivae/corneas clear. Neck: Supple, with full range of motion. No jugular venous distention.  Trachea midline. Respiratory:  Normal respiratory effort. Clear to auscultation, bilaterally without Rales/Wheezes/Rhonchi. Cardiovascular:  Regular rate and rhythm with normal S1/S2 without murmurs, rubs or gallops. Abdomen: Soft, non-tender, non-distended with normal bowel sounds. Musculoskeletal:  No clubbing, cyanosis or edema bilaterally. Full range of motion without deformity. Skin: Skin color, texture, turgor normal.    Neurologic:  Neurovascularly intact without any focal sensory/motor deficits. Cranial nerves: II-XII intact, grossly non-focal.  Psychiatric:  Alert and oriented, thought content appropriate  Capillary Refill: Brisk,< 3 seconds   Peripheral Pulses: +2 palpable, equal bilaterally       Labs: For convenience and continuity at follow-up the following most recent labs are provided:      CBC:    Lab Results   Component Value Date    WBC 8.3 04/23/2022    HGB 14.9 04/23/2022    HCT 45.0 04/23/2022     04/23/2022       Renal:    Lab Results   Component Value Date     04/23/2022    K 4.0 04/23/2022     04/23/2022    CO2 25 04/23/2022    BUN 12 04/23/2022    CREATININE 0.6 04/23/2022    CALCIUM 8.4 04/23/2022       Cardiac:   Recent Labs     04/22/22  1332 04/22/22  1756 04/23/22  0909   TROPONINT < 0.010 < 0.010 < 0.010       Significant Diagnostic Studies    Radiology:   NM Cardiac Stress Test Nuclear Imaging- Treadmill   Final Result      XR CHEST PORTABLE   Final Result   1. Mild cardiomegaly. 2. No acute findings. No infiltrates or effusions are seen. **This report has been created using voice recognition software. It may contain minor errors which are inherent in voice recognition technology. **      Final report electronically signed by Dr. Ann Kaiser on 4/22/2022 3:02 PM             Consults:     IP CONSULT TO CARDIOLOGY    Disposition:    [x] Home       [] TCU       [] Rehab       [] Psych       [] SNF       [] [facility]       [] Other-    Condition at Discharge: Stable    Code Status:  Full Code     Pending tests at discharge:      none    Patient Instructions:    Discharge lab work: echo  Activity: activity as tolerated  Diet: ADULT DIET; Regular; 4 carb choices (60 gm/meal); No Added Salt (3-4 gm); 2000 ml  Diet NPO      Follow-up visits:   [address]  [address] [facility]  [phone]    In 1 week      Yuliet Jeffery MD  [facility], [address]  [phone]    In 2 weeks  hosp F/U; echo         Discharge Medications:        Medication List      START taking these medications    aspirin 81 MG chewable tablet  Take 1 tablet by mouth daily  Start taking on: April 24, 2022        CONTINUE taking these medications    amLODIPine 10 MG tablet  Commonly known as: NORVASC  Take 1 tablet by mouth once daily     ascorbic acid 500 MG tablet  Commonly known as: VITAMIN C     CALCIUM 600 PO     lidocaine 5 % ointment  Commonly known as: XYLOCAINE  Apply topically as needed. losartan 100 MG tablet  Commonly known as: COZAAR  TAKE 1 TABLET DAILY     nystatin 040983 UNIT/GM cream  Commonly known as: MYCOSTATIN     Viagra 100 MG tablet  Generic drug: sildenafil     vitamin D 25 MCG (1000 UT) Tabs tablet  Commonly known as: CHOLECALCIFEROL     zinc 50 MG Caps           Where to Get Your Medications      You can get these medications from any pharmacy    You don't need a prescription for these medications  · aspirin 81 MG chewable tablet         Time Spent on discharge is more than 45 minutes in the examination, evaluation, counseling and review of medications and discharge plan. Signed: Thank you Kimberli Neal DO for the opportunity to be involved in this patient's care.     Electronically signed by HAROLDO Camp CNP on 4/23/2022 at 2:58 PM

## 2022-04-23 NOTE — PROGRESS NOTES
Hospitalist Progress Note    Patient:  Mingo Camacho      Unit/Bed:8B-25/025-A    YOB: 1955    MRN: [id number]       Acct: [de-identified]     PCP: Lady Misha DO    Date of Admission: 4/22/2022    Assessment/Plan:    1. Acute chest pain, possible unstable angina--ACS ruled out by 3 (-) troponins; will add aspirin 81 mg daily, will check lipid panel by using blood in the lab from this morning; EKG showing sinus rhythm, left ventricular fascicular block, heart rate 77 with inverted T waves in aVL; appreciate cardiology input; await stress test  2. Essential hypertension, uncontrolled--on Norvasc, Cozaar; monitor  3. Hyperglycemia, acute--possibly reactive, hemoglobin A1c noted be 6.4 today  4. BPH--not on any medications  5. Obesity with BMI 33.47    Expected discharge date: Awaiting stress test    Disposition:    [x] Home       [] TCU       [] Rehab       [] Psych       [] SNF       [] [place]       [] Other-    Chief Complaint: Chest pain    Hospital Course: Per H&P dated 4/22: \"Curtis is a 53-year-old  male with past medical history of BPH, HTN who presents to [facility] ED today for evaluation of intermittent chest pain. Patient states that he was sent from his doctor's office today, for further work-up and evaluation. Patient reports last Wednesday riding the lawnmower in a very bumpy area of his lawn, and was jerked around quite a bit. He developed 1 episode of sharp chest pain following the event. The pain is located substernal, and is described as sharp, nonradiating. Patient states that he also has had 3 episodes today at work of [employer], sharp chest pain. Chest pain resolves with rest.  He states that he he thinks his chest pain is triggered by certain movements, but has been unable to reproduce it. Patient has been free of chest pain since ED visit.   Patient states he has also been experiencing abdominal distention, lower leg swelling, increased shortness of breath, shortness of breath while laying flat. He denies recent weight change, cough, abdominal pain, nausea, vomiting, fever, chills. \"    4/23--> hemodynamically stable; patient again relates that he was mowing his yard Wednesday morning with a zero turn mower and it was very rough and it was jerking him around he developed chest pain, states the pain has not been there since being here at the hospital     Subjective (past 24 hours): Sitting up in the chair, denies chest pain, denies shortness of breath, denies any lightheadedness or dizziness    Medications:  Reviewed    Infusion Medications    sodium chloride       Scheduled Medications    ascorbic acid  500 mg Oral Daily    losartan  100 mg Oral Daily    Vitamin D  1,000 Units Oral Daily    zinc sulfate  50 mg Oral Daily    sodium chloride flush  10 mL IntraVENous 2 times per day    enoxaparin  40 mg SubCUTAneous Daily    amLODIPine  10 mg Oral Daily    multivitamin  1 tablet Oral Daily     PRN Meds: sodium chloride flush, sodium chloride, ondansetron **OR** ondansetron, polyethylene glycol, acetaminophen **OR** acetaminophen, potassium chloride **OR** potassium alternative oral replacement **OR** potassium chloride, magnesium sulfate, melatonin      Intake/Output Summary (Last 24 hours) at 4/23/2022 8200  Last data filed at 4/22/2022 2052  Gross per 24 hour   Intake 1000 ml   Output --   Net 1000 ml       Diet:  ADULT DIET; Regular; 4 carb choices (60 gm/meal); No Added Salt (3-4 gm); 2000 ml    Exam:  BP (!) 142/83   Pulse 78   Temp 98.2 °F (36.8 °C) (Oral)   Resp 16   Wt 226 lb 10.1 oz (102.8 kg)   SpO2 96%   BMI 33.47 kg/m²     General appearance: No apparent distress, appears stated age and cooperative. HEENT: Pupils equal, round, and reactive to light. Conjunctivae/corneas clear. Neck: Supple, with full range of motion. No jugular venous distention. Trachea midline. Respiratory:  Normal respiratory effort.  Clear to auscultation, bilaterally without Rales/Wheezes/Rhonchi. Cardiovascular: Regular rate and rhythm with normal S1/S2 without murmurs, rubs or gallops. Abdomen: Soft, non-tender, non-distended with normal bowel sounds. Musculoskeletal: passive and active ROM x 4 extremities. Skin: Skin color, texture, turgor normal.    Neurologic:  Neurovascularly intact without any focal sensory/motor deficits. Cranial nerves: II-XII intact, grossly non-focal.  Psychiatric: Alert and oriented, thought content appropriate  Capillary Refill: Brisk,< 3 seconds   Peripheral Pulses: +2 palpable, equal bilaterally       Labs:   Recent Labs     04/22/22  1332 04/23/22  0603   WBC 7.8 8.3   HGB 15.4 14.9   HCT 46.0 45.0    259     Recent Labs     04/22/22  1332 04/23/22  0603    136   K 4.1 4.0   CL 99 100   CO2 25 25   BUN 13 12   CREATININE 0.7 0.6   CALCIUM 9.1 8.4*     Recent Labs     04/22/22 1332   AST 20   ALT 22   BILITOT 0.2*   ALKPHOS 86     Microbiology:    None    Radiology:  XR CHEST PORTABLE    Result Date: 4/22/2022  PROCEDURE: XR CHEST PORTABLE CLINICAL INFORMATION: Chest pain. COMPARISON: 11/30/2019 TECHNIQUE: A single mobile view of the chest was obtained. 1. Mild cardiomegaly. 2. No acute findings. No infiltrates or effusions are seen. **This report has been created using voice recognition software. It may contain minor errors which are inherent in voice recognition technology. ** Final report electronically signed by Dr. Dima Navarro on 4/22/2022 3:02 PM      DVT prophylaxis: [x] Lovenox                                 [] SCDs                                 [] SQ Heparin                                 [x] Encourage ambulation           [] Already on Anticoagulation     Code Status: Full Code    PT/OT Eval Status: monitor    Tele:   [x] yes sinus rhythm heart rate 87             [] no    Active Hospital Problems    Diagnosis Date Noted    Chest pain [R07.9] 04/22/2022     Priority: Medium Electronically signed by HAROLDO Rosario CNP on 4/23/2022 at 7:07 AM

## 2022-04-25 ENCOUNTER — TELEPHONE (OUTPATIENT)
Dept: FAMILY MEDICINE CLINIC | Age: 67
End: 2022-04-25

## 2022-04-25 NOTE — TELEPHONE ENCOUNTER
Ana Mar[facility] Transitions Initial Follow Up Call    Outreach made within 2 business days of discharge: Yes    Patient: Amy Flynn Patient : 1955   MRN: [id number]  Reason for Admission: There are no discharge diagnoses documented for the most recent discharge. Discharge Date: 22       Spoke with: AMARJIT Christianson department/facility: Lilli Yang [address] [place] [address] Interactive Patient Contact:  Was patient able to fill all prescriptions: Yes  Was patient instructed to bring all medications to the follow-up visit: Yes  Is patient taking all medications as directed in the discharge summary?  Yes  Does patient understand their discharge instructions: Yes  Does patient have questions or concerns that need addressed prior to 7-14 day follow up office visit: no    Scheduled appointment with PCP within 7-14 days    Follow Up  Future Appointments   Date Time Provider Ju Urbina   2022 10:40 AM Kellen Avendano, [facility] [address] [place] [address]   2022  9:00 AM Kellen Avendano, [address], CMA ([address] [place] [address])

## 2022-04-25 NOTE — TELEPHONE ENCOUNTER
Ana María 45 Transitions Initial Follow Up Call    Outreach made within 2 business days of discharge: Yes    Patient: Rg Gil Patient : 1955   MRN: [id number]  Reason for Admission: There are no discharge diagnoses documented for the most recent discharge.   Discharge Date: 22       Spoke with: [facility] to call back at earliest convenience     Discharge department/facility: [facility] Referred by: Chuck Agrawal MD; Medical Diagnosis (from order):    Diagnosis Information      Diagnosis    719.41 (ICD-9-CM) - M25.511, M25.512 (ICD-10-CM) - Acute pain of both shoulders                Daily Treatment Note    Visit:  8     SUBJECTIVE                                                                                                             Patient comes in today with no shoulder pain, states that he has been diligent with his strength exercises on most days. Mother attended the session today and was filled in on what had been going on  Functional Change: Performed a short swim practice today to see how shoulders would feel. Said he did ok except for the fatigue near the end of the swim. No clicking or pain during swim.    Pain / Symptoms:  Pain rating (out of 10): Current: 0 ; Best: 0    OBJECTIVE                                                                                                                     Observation:   Comments / Details: No popping or grinding to the shoulders with increased activity. Still shows weakness in the upper Trunk and shoulders       TREATMENT                                                                                                                  Therapeutic Exercise:  UBE standing warm up - focus on trunk rotation  TRX stretching of anterior shoulder and chest  Chest press on Physio ball 2 x 12 10#  Incline Press with DB 2 x 12 10#  Prone on table T,Y,A, with 2 # 1 x 10 bilaterally  IR/ER  w/blue cable 2 x 10 2kg  Triceps push down 2 x 10  12 #  Lat pull downs 2 x 12 35#  Standing rows 2 x 12 #12  Standing Jaspal 1 x 15 12#  Reverse fly's 2 x 12 5#    Skilled input: verbal instruction/cues, tactile instruction/cues and posture correction    Writer verbally educated and received verbal consent for hand placement, positioning of patient, and techniques to be performed today from patient     Home Exercise Program: Make list of what equipment he has available  to use both at home and the gym  Will establish a HEP to build strength for his sport     ASSESSMENT                                                                                                             Patient continues to show good progress in ROM, strength is good for day to day activity. However he has not been doing as much since he dropped his PE class at school. He has been swimming and doing so pain free. Needs to continue to work on posture and back strength. A program will be put together for him to continue to do.  Pain/symptoms after session (out of 10): 0    Patient Education:   Results of above outlined education: Verbalizes understanding and Demonstrates understanding      PLAN                                                                                                                           Suggestions for next session as indicated: Progress per plan of care: continue to build strength and educate on what he has to do to correct posture both in and out of the water.         Therapy procedure time and total treatment time can be found documented on the Time Entry flowsheet

## 2022-04-26 ENCOUNTER — TELEPHONE (OUTPATIENT)
Dept: CARDIOLOGY CLINIC | Age: 67
End: 2022-04-26

## 2022-04-26 NOTE — TELEPHONE ENCOUNTER
Patient is calling to schedule ECHO from his d/c papers. Please advise if he is also needing an OV.   [phone]

## 2022-04-27 NOTE — TELEPHONE ENCOUNTER
Spoke to patient and transferred him to central scheduling to schedule the echo that was ordered by the internal medicine doctors. Pt is to call back once echo is scheduled to schedule follow up appointment with cardiology.

## 2022-04-28 DIAGNOSIS — I10 ESSENTIAL HYPERTENSION: ICD-10-CM

## 2022-04-29 RX ORDER — AMLODIPINE BESYLATE 10 MG/1
TABLET ORAL
Qty: 90 TABLET | Refills: 3 | Status: SHIPPED | OUTPATIENT
Start: 2022-04-29

## 2022-05-04 ENCOUNTER — HOSPITAL ENCOUNTER (OUTPATIENT)
Dept: NON INVASIVE DIAGNOSTICS | Age: 67
Discharge: HOME | End: 2022-05-04
Payer: MEDICARE

## 2022-05-04 DIAGNOSIS — R07.9 CHEST PAIN, UNSPECIFIED TYPE: ICD-10-CM

## 2022-05-04 LAB
LV EF: 60 %
LVEF MODALITY: NORMAL

## 2022-05-04 PROCEDURE — 93306 TTE W/DOPPLER COMPLETE: CPT

## 2022-05-04 NOTE — PROGRESS NOTES
Chief Complaint   Patient presents with   [address] from [facility], chest pain       History obtained from the patient. SUBJECTIVE:  Prudy Opitz is a 74 y.o. male that presents today for     [facility] to 4/23 for chest pain. D/c summary: \"Discharge Diagnoses with Assessment/Plan:  1. Acute chest pain, etiology possibly musculoskeletal--ACS ruled out by 3 (-) troponins; aspirin 81 mg daily, lipid panel noted and at this time patient does not want to be on medication he wants to improve his diet and exercise; EKG showing sinus rhythm, left ventricular fascicular block, heart rate 77 with inverted T waves in aVL; appreciate cardiology input; stress test did not reveal ischemia; outpatient echocardiogram  2. Essential hypertension, uncontrolled--on Norvasc, Cozaar;  stable  3. Hyperglycemia, acute--likely secondary to nonfasting as patient stated that he had a donut yesterday morning, hemoglobin A1c noted be 6.4 today  4. BPH--not on any medications  5.  Obesity with BMI 33.47     The patient was seen and examined on day of discharge and this discharge summary is in conjunction with any daily progress note from day of discharge.     Hospital Course:   Prudy Opitz is a 74 y.o. male admitted to [address] [place] [address] on 4/22/2022 for chest pain; Per H&P dated 4/22: \"Curtis is a 22-year-old  male with past medical history of BPH, HTN who presents to [facility] ED today for evaluation of intermittent chest pain.  Patient states that he was sent from his doctor's office today, for further work-up and evaluation.  Patient reports last Wednesday riding the lawnmower in a very bumpy area of his lawn, and was jerked around quite a bit. [facility] developed 1 episode of sharp chest pain following the event.  The pain is located substernal, and is described as sharp, nonradiating.  Patient states that he also has had 3 episodes today at work of [employer], sharp chest pain.  Chest pain resolves with rest. Gilberto Arizmendi states that he he thinks his chest pain is triggered by certain movements, but has been unable to reproduce it.  Patient has been free of chest pain since ED visit. Bandar Mitchell states he has also been experiencing abdominal distention, lower leg swelling, increased shortness of breath, shortness of breath while laying flat.  He denies recent weight change, cough, abdominal pain, nausea, vomiting, fever, chills. \"     4/23--> hemodynamically stable; patient again relates that he was mowing his yard Wednesday morning with [employer] and it was very rough and it was jerking him around he developed chest pain, states the pain has not been there since being here at the hospital; stress test was done did not reveal ischemia, patient remains pain-free without any complaints at all; he continues to be hemodynamically stable, he really wants to go home, wife at bedside, plan discharge home today with outpatient echocardiogram and follow-up with PCP and cardiology\"    Since discharge:  Doing well  No further CP  No SOB  BP's stable, <140/90  Taking meds as rx'd  Reviewed stress test and echo with pt  Has f/u cardio next wk    a1c up to 6.4  Needs to dose wt and improve diet  He is aware and is motivated to work on this.        Age/Gender Health Maintenance    Lipid -   Lab Results   Component Value Date    CHOL 166 04/23/2022    CHOL 166 05/26/2021    CHOL 174 08/17/2020     Lab Results   Component Value Date    TRIG 126 04/23/2022    TRIG 105 05/26/2021    TRIG 124 08/17/2020     Lab Results   Component Value Date    HDL 33 04/23/2022    HDL 37 05/26/2021    HDL 36 08/17/2020     Lab Results   Component Value Date    LDLCALC 108 04/23/2022    LDLCALC 108 05/26/2021    LDLCALC 113 08/17/2020       DM Screen -   Lab Results   Component Value Date    GLUCOSE 133 04/23/2022    GLUCOSE 214 06/21/2021     Lab Results   Component Value Date    LABA1C 6.4 04/23/2022    LABA1C 6.3 11/20/2021    LABA1C 6.2 05/26/2021    LABA1C 6.0 05/21/2020    LABA1C 6.4 02/21/2020    LABA1C 6.1 08/12/2019       113 (OCT 2015)/110 (JUNE 2016)/100 (DEC 2017)    With a1c 6.1 (SEPT 2017)    TSH - 4.090 (DEC 2017)    Lab Results   Component Value Date    TSH 2.600 05/26/2021       Colon Cancer Screening - NEG Cologuard June 2020  Lung Cancer Screening (Age 54 to [de-identified] with 30 pack year hx, current smoker or quit within past 15 years) - never smoker    Tetanus - UTD July 2015  Influenza Vaccine - UTD FALL 2021  Pneumonia Vaccine - UTD PCV 13 in AUG 2019/PPV 23 in AUG 2020  Zoster - UTD shingrix x 2    PSA testing discussion - Follows with urology at [facility]    Lab Results   Component Value Date    PSA 1.38 (H) 04/02/2022    PSA 6.53 (H) 10/07/2020    PSA 5.84 (H) 08/02/2019     AAA Screening - never smoker       Current Outpatient Medications   Medication Sig Dispense Refill    amLODIPine (NORVASC) 10 MG tablet Take 1 tablet by mouth once daily 90 tablet 3    aspirin 81 MG chewable tablet Take 1 tablet by mouth daily 30 tablet 3    Calcium Carbonate (CALCIUM 600 PO) Take 1 tablet by mouth in the morning and at bedtime      vitamin D (CHOLECALCIFEROL) 25 MCG (1000 UT) TABS tablet Take 1,000 Units by mouth daily      ascorbic acid (VITAMIN C) 500 MG tablet Take 500 mg by mouth daily      zinc 50 MG CAPS Take by mouth      losartan (COZAAR) 100 MG tablet TAKE 1 TABLET DAILY 90 tablet 3    lidocaine (XYLOCAINE) 5 % ointment Apply topically as needed. 50 g 1    nystatin (MYCOSTATIN) 515887 UNIT/GM cream Apply topically as needed Apply topically 2 times daily. As needed      VIAGRA 100 MG TABS        No current facility-administered medications for this visit. No orders of the defined types were placed in this encounter. All medications reviewed and reconciled, including OTC and herbal medications. Updated list given to patient.        Patient Active Problem List   Diagnosis    Dermatitis    Hypertension    Erectile dysfunction    BPH with elevated PSA    Prediabetes    Obesity (BMI 30-39. 9)    Chest pain       Past Medical History:   Diagnosis Date    BPH with elevated PSA 7/17/2015    Follows with urology for this and elevated PSA    Dermatitis     follows with Dr. Reji trevino Erectile dysfunction     Hypertension     Obesity (BMI 30-39. 9)     Prediabetes 7/29/2016       Past Surgical History:   Procedure Laterality Date    COLONOSCOPY  2007    Dr. Alexi Gibbs N/A 2/17/2022    RIGHT INGUINAL HERNIA REPAIR WITH MESH performed by Kemi Henao DO at [facility].  2005    Dr Camila Barger ablation of prostate    PROSTATECTOMY N/A 07/01/2021    Robotic-assisted laparoscopic suprapubic prostatectomy @ [facility]         No Known Allergies      Social History     Tobacco Use    Smoking status: Never Smoker    Smokeless tobacco: Never Used   Substance Use Topics    Alcohol use: No       Family History   Problem Relation Age of Onset    High Blood Pressure Mother     Cancer Father         Lung Cancer    Colon Cancer Neg Hx     Prostate Cancer Neg Hx          I have reviewed the patient's past medical history, past surgical history, allergies, medications, social and family history and I have made updates where appropriate.       Review of Systems  Positive responses are highlighted in bold    Constitutional:  Fever, Chills, Night Sweats, Fatigue, Unexpected changes in weight  HENT:  Ear pain, Tinnitus, Nosebleeds, Trouble swallowing, Hearing loss, Sore throat  Cardiovascular:  Chest Pain, Palpitations, Orthopnea, Paroxysmal Nocturnal Dyspnea  Respiratory:  Cough, Wheezing, Shortness of breath, Chest tightness, Apnea  Gastrointestinal:  Nausea, Vomiting, Diarrhea, Constipation, Heartburn, Blood in stool  Genitourinary:  Difficulty or painful urination, Flank pain, Change in frequency, Urgency  Skin:  Color change, Rash, Itching, Wound  Musculoskeletal:  Joint pain, Back pain, Gait problems, Joint swelling, Myalgias  Neurological:  Dizziness, Headaches, Presyncope, Numbness, Seizures, Tremors  Endocrine:  Heat Intolerance, Cold Intolerance, Polydipsia, Polyphagia, Polyuria      PHYSICAL EXAM:  Vitals:    05/05/22 1043   BP: 122/60   Pulse: 88   Resp: 12   Temp: 97.7 °F (36.5 °C)   TempSrc: Oral   SpO2: 97%   Weight: 230 lb (104.3 kg)   Height: 5' 8\" (1.727 m)     Body mass index is 34.97 kg/m². VS Reviewed  General Appearance: A&O x 3, No acute distress,well developed and well- nourished  Eyes: pupils equal, round, and reactive to light, extraocular eye movements intact, conjunctivae and eye lids without erythema  ENT: external ear and ear canal clear bilaterally, TMs intact and regular, nose without deformity, nasal mucosa and turbinates normal without polyps, oropharynx normal, dentition is normal for age  Neck: supple and non-tender without mass, no thyromegaly or thyroid nodules, no cervical lymphadenopathy  Pulmonary/Chest: clear to auscultation bilaterally- no wheezes, rales or rhonchi, normal air movement, no respiratory distress or retractions  Cardiovascular: S1 and S2 auscultated w/ RRR. No murmurs, rubs, clicks, or gallops, distal pulses intact. Abdomen: soft, non-tender, non-distended, bowel sounds physiologic,  no rebound or guarding, no masses or hernias noted. Liver and spleen without enlargement. Extremities: no cyanosis, clubbing or edema of the lower extremities.    Skin: warm and dry, no rash or erythema    Lab Results   Component Value Date    WBC 8.3 04/23/2022    HGB 14.9 04/23/2022    HCT 45.0 04/23/2022    MCV 93.4 04/23/2022     04/23/2022     Lab Results   Component Value Date     04/23/2022    K 4.0 04/23/2022     04/23/2022    CO2 25 04/23/2022    BUN 12 04/23/2022    CREATININE 0.6 04/23/2022    GLUCOSE 133 04/23/2022    GLUCOSE 214 06/21/2021    CALCIUM 8.4 04/23/2022      Lab Results   Component Value Date    LABA1C 6.4 04/23/2022 LABA1C 6.3 11/20/2021    LABA1C 6.2 05/26/2021    LABA1C 6.0 05/21/2020    LABA1C 6.4 02/21/2020    LABA1C 6.1 08/12/2019       Lab Results   Component Value Date    CHOL 166 04/23/2022    CHOL 166 05/26/2021    CHOL 174 08/17/2020     Lab Results   Component Value Date    TRIG 126 04/23/2022    TRIG 105 05/26/2021    TRIG 124 08/17/2020     Lab Results   Component Value Date    HDL 33 04/23/2022    HDL 37 05/26/2021    HDL 36 08/17/2020     Lab Results   Component Value Date    LDLCALC 108 04/23/2022    LDLCALC 108 05/26/2021    [address] 113 08/17/2020       Narrative   PROCEDURE: XR CHEST PORTABLE       CLINICAL INFORMATION: Chest pain.       COMPARISON: 11/30/2019       TECHNIQUE: A single mobile view of the chest was obtained.               Impression   1. Mild cardiomegaly. 2. No acute findings. No infiltrates or effusions are seen.               **This report has been created using voice recognition software.  It may contain minor errors which are inherent in voice recognition technology. **       Final report electronically signed by Dr. Carri Henry on 4/22/2022 3:02 PM       Narrative   Cardiac Perfusion Imaging        Demographics        Patient Name   Niki Neumann                 Male        MR #           [id number]       Race                                                          Ethnicity        Account #     [de-identified]       Room Number            0025        Accession      [id number]      Date of study          04/23/2022    Number        Date of Birth  1955      Referring Physician   Florence Anders                                                          DO        Age           Ruben.Ill year(s)      NM Technologist        [facility]        Stress Staff   Anand Cleary MD                   RN              Cardiologist        The procedure was explained in detail to the patient.  Risks,    complications and alternative treatments were reviewed. Written consent    was obtained.       Procedure   Procedure Type:        Nuclear Stress Test:Exercise, Exercise Cardiolite Stress       Indications: Chest pain.       Medical History:Patient history obtained by: Emeka Santos RN. Chest pain status: chest pain .        Risk Factors        The patient risk factors include:treated hypertension.       Stress Protocols        Resting ECG    Normal sinus rhythm.        Resting HR:76 bpm                Resting BP:155/75 mmHg       Stress Protocol:Exercise - Edu   +--------+-----+----+-----------+-----+--------+---+--+--------+------+----+   ! Stage # ! Time ! Work! Speed/Grade! Heart! Blood   !RPE!CP! Pain    !Pain  !Pain! !        !     !    !           !Rate ! Pressure!   !  !Location! Action! Type!   +--------+-----+----+-----------+-----+--------+---+--+--------+------+----+   ! 1.0     !03:00!4.7 !1.7/10.0   !113  !169/75  !   !  !        !      !    !   +--------+-----+----+-----------+-----+--------+---+--+--------+------+----+   ! 2.0     !06:00!7.1 !2.5/12.0   !127  !176/77  !   !  !        !      !    !   +--------+-----+----+-----------+-----+--------+---+--+--------+------+----+   ! 3.0     !09:00!10.1!3.4/14.0   !146  !184/78  !   !  !        !      !    !   +--------+-----+----+-----------+-----+--------+---+--+--------+------+----+   ! Recovery! 00:00!    !           !144  !172/80  !   !  !        !      !    !   +--------+-----+----+-----------+-----+--------+---+--+--------+------+----+   ! Recovery! 02:00!    !           !116  !165/72  !   !  !        !      !    !   +--------+-----+----+-----------+-----+--------+---+--+--------+------+----+   ! Recovery! 05:00!    !           !95   !155/75  !   !  !        !      !    !   +--------+-----+----+-----------+-----+--------+---+--+--------+------+----+        Peak HR:146 bpm                      HR response: Appropriate    Peak BP:184/78 mmHg                  BP response: Normal Resting BP with  Predicted HR: 152 bpm                appropriate response to Stress    % of predicted HR: 96                HR/BP product:[id number]    Test duration:7.67 min               Max exercise: 10.1 METS    Reason for termination:Target HR    achieved                             Exercise effort:Fair        ECG Findings    No ischemic EKG changes.        Arrhythmias    No stress induced arrhythmia.        Symptoms    Patient did not have chest pain during the stress.        Stress Interpretation    Exercise capacity: Slightly Decreased .    No chest pain during the stress.    No stress induced arrhythmia.    Exercise EKG stress test is not suggestive for ischemia.        Imaging Protocols        Rest                                Stress        Isotope:Tc-99m Sestamibi            Isotope: Tc-99m Sestamibi    Isotope dose:11 mCi                 Isotope dose:35 mCi    Date:04/23/2022 09:35               Date:04/23/2022 11:00        Technique:           SPECT          Technique:           Gated   [de-identified]                                                        SPECT       Imaging Results:Calculated gated LVEF 67 %. The T.I.D. ratio was .81 . There is moderate attenuation artifact noted in   the inferior wall seems to be related to bowel artifact. There was no   evidence of definite reversible tracer uptake.  The nuclear images is not   suggestive for myocardial ischemia.        Conclusions        Summary    Exercise EKG stress test is not suggestive for ischemia.    The nuclear images is not suggestive for myocardial ischemia.        Signatures        ----------------------------------------------------------------    Electronically signed by Zenia Dubose MD (Interpreting    Cardiologist) on 04/23/2022 at 14:04    ----------------------------------------------------------------       Medical History        Accession#:                           [id number]       Admission Data   Admission date: 04/22/2022 Admission Time: 12:56     Hospital Status: Inpatient.       [email or web address] [id number]       ECHO 05 MAY 2022   Summary   Normal left ventricle size and systolic function. Ejection fraction was   estimated at 60 %. There were no regional left ventricular wall motion   abnormalities and wall thickness was within normal limits. Doppler parameters were consistent with abnormal left ventricular   relaxation (grade 1 diastolic dysfunction). ASSESSMENT & PLAN  1. Chest pain, unspecified type    Improved  Reassuring labs, imaging, stress and echo  Likely MSK  F/u cardio next wk  con't RF mod with BP  Work on diet changes/wt loss    Date of Discharge: 4/23/2022 (Saturday)  Date of interactive contact with pt/caregiver: 4/25/2022 (Monday)  Date of face-to-face visit: 5/5/2022  Complexity of medical decision making: Moderate    - VT DISCHARGE MEDS RECONCILED W/ CURRENT OUTPATIENT MED LIST    2. Essential hypertension    Stable  At goal  con't Cozaar/Norvasc    - VT DISCHARGE MEDS RECONCILED W/ CURRENT OUTPATIENT MED LIST    3. Prediabetes    a1c creeping up more  Long discussion  Needs to get serious about diet changes/wt loss  He is motivated and will con't to work on this  F/u August, labs then    - VT DISCHARGE MEDS RECONCILED W/ CURRENT OUTPATIENT MED LIST    4. Obesity (BMI 30-39. 9)    As per # 3    - VT DISCHARGE MEDS RECONCILED W/ CURRENT OUTPATIENT MED LIST          DISPOSITION    Return in 4 months (on 8/24/2022) for AWV, follow-up on chronic medical conditions, sooner as needed. More Clifford released without restrictions.     Future Appointments   Date Time Provider Ju [place]   5/10/2022  2:00 PM Sundar Burger MD [facility]   8/24/2022  9:00 AM Carrillo Norris DO [facility]     PATIENT COUNSELING    Barriers to learning and self management: none    Discussed use, benefit, and side effects of prescribed medications. Barriers to medication compliance addressed. All patient questions answered. Pt voiced understanding.        Electronically signed by Nohemi Webber DO on 5/5/2022 at 11:04 AM

## 2022-05-05 ENCOUNTER — OFFICE VISIT (OUTPATIENT)
Dept: FAMILY MEDICINE CLINIC | Age: 67
End: 2022-05-05
Payer: MEDICARE

## 2022-05-05 VITALS
OXYGEN SATURATION: 97 % | TEMPERATURE: 97.7 F | DIASTOLIC BLOOD PRESSURE: 60 MMHG | SYSTOLIC BLOOD PRESSURE: 122 MMHG | BODY MASS INDEX: 34.86 KG/M2 | HEART RATE: 88 BPM | WEIGHT: 230 LBS | HEIGHT: 68 IN | RESPIRATION RATE: 12 BRPM

## 2022-05-05 DIAGNOSIS — I10 ESSENTIAL HYPERTENSION: ICD-10-CM

## 2022-05-05 DIAGNOSIS — E66.9 OBESITY (BMI 30-39.9): ICD-10-CM

## 2022-05-05 DIAGNOSIS — R07.9 CHEST PAIN, UNSPECIFIED TYPE: Primary | ICD-10-CM

## 2022-05-05 DIAGNOSIS — R73.03 PREDIABETES: ICD-10-CM

## 2022-05-05 PROCEDURE — 99495 TRANSJ CARE MGMT MOD F2F 14D: CPT | Performed by: FAMILY MEDICINE

## 2022-05-05 PROCEDURE — 1111F DSCHRG MED/CURRENT MED MERGE: CPT | Performed by: FAMILY MEDICINE

## 2022-05-10 ENCOUNTER — OFFICE VISIT (OUTPATIENT)
Dept: CARDIOLOGY CLINIC | Age: 67
End: 2022-05-10
Payer: MEDICARE

## 2022-05-10 VITALS
WEIGHT: 231.2 LBS | BODY MASS INDEX: 33.1 KG/M2 | HEART RATE: 93 BPM | SYSTOLIC BLOOD PRESSURE: 134 MMHG | HEIGHT: 70 IN | DIASTOLIC BLOOD PRESSURE: 71 MMHG

## 2022-05-10 DIAGNOSIS — I10 PRIMARY HYPERTENSION: Primary | Chronic | ICD-10-CM

## 2022-05-10 DIAGNOSIS — Z87.898 HISTORY OF CHEST PAIN: ICD-10-CM

## 2022-05-10 PROCEDURE — 1036F TOBACCO NON-USER: CPT | Performed by: INTERNAL MEDICINE

## 2022-05-10 PROCEDURE — 4040F PNEUMOC VAC/ADMIN/RCVD: CPT | Performed by: INTERNAL MEDICINE

## 2022-05-10 PROCEDURE — 99214 OFFICE O/P EST MOD 30 MIN: CPT | Performed by: INTERNAL MEDICINE

## 2022-05-10 PROCEDURE — 1111F DSCHRG MED/CURRENT MED MERGE: CPT | Performed by: INTERNAL MEDICINE

## 2022-05-10 PROCEDURE — 3017F COLORECTAL CA SCREEN DOC REV: CPT | Performed by: INTERNAL MEDICINE

## 2022-05-10 PROCEDURE — G8427 DOCREV CUR MEDS BY ELIG CLIN: HCPCS | Performed by: INTERNAL MEDICINE

## 2022-05-10 PROCEDURE — G8417 CALC BMI ABV UP PARAM F/U: HCPCS | Performed by: INTERNAL MEDICINE

## 2022-05-10 PROCEDURE — 1123F ACP DISCUSS/DSCN MKR DOCD: CPT | Performed by: INTERNAL MEDICINE

## 2022-05-10 NOTE — PROGRESS NOTES
Chief Complaint   Patient presents with   Kei Warren from [address] [place] [address] St:     Sherren Najjar. LASHON Roman Share:  04/23/2022     REASON FOR CONSULTATION:  Presentation with chest pain.       Pt here for a hospital f/u    No more chest pain after D/c    Denied cp, dizziness, palpitation or edema    EKG done 4-22-22     sob on exertion      Past Surgical History:   Procedure Laterality Date    COLONOSCOPY  2007    Dr. Andre Santiago N/A 2/17/2022    RIGHT INGUINAL HERNIA REPAIR WITH MESH performed by Zollie Leyden, DO at [facility]  2005    Dr Virginia Song laser ablation of prostate    PROSTATECTOMY N/A 07/01/2021    Robotic-assisted laparoscopic suprapubic prostatectomy @ [facility]       No Known Allergies     Family History   Problem Relation Age of Onset    High Blood Pressure Mother     Cancer Father         Lung Cancer    Colon Cancer Neg Hx     Prostate Cancer Neg Hx         Social History     Socioeconomic History    Marital status:      Spouse name: Not on file    Number of children: Not on file    Years of education: Not on file    Highest education level: Not on file   Occupational History    Not on file   Tobacco Use    Smoking status: Never Smoker    Smokeless tobacco: Never Used   Substance and Sexual Activity    Alcohol use: No    Drug use: No    Sexual activity: Not on file   Other Topics Concern    Not on file   Social History Narrative    Not on file     Social Determinants of Health     Financial Resource Strain:     Difficulty of Paying Living Expenses: Not on file   Food Insecurity:     Worried About [address] in the Last Year: Not on file    [address] in the Last Year: Not on file   Transportation Needs:     Lack of Transportation (Medical): Not on file    Lack of Transportation (Non-Medical):  Not on file   Physical Activity:     Days of Exercise per Week: Not on file    Minutes of Exercise per Session: Not on file   Stress:     Feeling of Stress : Not on file   Social Connections:     Frequency of Communication with Friends and Family: Not on file    Frequency of Social Gatherings with Friends and Family: Not on file    Attends Restorationism Services: Not on file    Active Member of Clubs or Organizations: Not on file    Attends Club or Organization Meetings: Not on file    Marital Status: Not on file   Intimate Partner Violence:     Fear of Current or Ex-Partner: Not on file    Emotionally Abused: Not on file    Physically Abused: Not on file    Sexually Abused: Not on file   Housing Stability:     Unable to Pay for Housing in the Last Year: Not on file    Number of [place] in the Last Year: Not on file    Unstable Housing in the Last Year: Not on file       Current Outpatient Medications   Medication Sig Dispense Refill    amLODIPine (NORVASC) 10 MG tablet Take 1 tablet by mouth once daily 90 tablet 3    aspirin 81 MG chewable tablet Take 1 tablet by mouth daily 30 tablet 3    Calcium Carbonate (CALCIUM 600 PO) Take 1 tablet by mouth in the morning and at bedtime      vitamin D (CHOLECALCIFEROL) 25 MCG (1000 UT) TABS tablet Take 1,000 Units by mouth daily      ascorbic acid (VITAMIN C) 500 MG tablet Take 500 mg by mouth daily      zinc 50 MG CAPS Take by mouth      losartan (COZAAR) 100 MG tablet TAKE 1 TABLET DAILY 90 tablet 3    lidocaine (XYLOCAINE) 5 % ointment Apply topically as needed. 50 g 1    nystatin (MYCOSTATIN) 231731 UNIT/GM cream Apply topically as needed Apply topically 2 times daily. As needed      VIAGRA 100 MG TABS        No current facility-administered medications for this visit. Review of Systems -     General ROS: negative  Psychological ROS: negative  Hematological and Lymphatic ROS: No history of blood clots or bleeding disorder.    Respiratory ROS: no cough,  or wheezing, the rest see HPI  Cardiovascular ROS: See HPI  Gastrointestinal ROS: negative  Genito-Urinary ROS: no dysuria, trouble voiding, or hematuria  Musculoskeletal ROS: negative  Neurological ROS: no TIA or stroke symptoms  Dermatological ROS: negative      Blood pressure 134/71, pulse 93, height 5' 10\" (1.778 m), weight 231 lb 3.2 oz (104.9 kg). Physical Examination:    General appearance - alert, well appearing, and in no distress  HEENT- Pink conjunctiva  , Non-icteri sclera,PERRLA  Mental status - alert, oriented to person, place, and time  Neck - supple, no significant adenopathy, no JVD, or carotid bruits  Chest - clear to auscultation, no wheezes, rales or rhonchi, symmetric air entry  Heart - normal rate, regular rhythm, normal S1, S2, no murmurs, rubs, clicks or gallops  Abdomen - soft, nontender, nondistended, no masses or organomegaly  WILLAM- no CVA or flank tenderness, no suprapubic tenderness  Neurological - alert, oriented, normal speech, no focal findings or movement disorder noted  Musculoskeletal/limbs - no joint tenderness, deformity or swelling   - peripheral pulses normal, no pedal edema, no clubbing or cyanosis  Skin - normal coloration and turgor, no rashes, no suspicious skin lesions noted  Psych- appropriate mood and affect    Lab  No results for input(s): CKTOTAL, CKMB, CKMBINDEX, TROPONINI in the last 72 hours.   CBC:   Lab Results   Component Value Date    WBC 8.3 04/23/2022    RBC 4.82 04/23/2022    HGB 14.9 04/23/2022    HCT 45.0 04/23/2022    MCV 93.4 04/23/2022    MCH 30.9 04/23/2022    MCHC 33.1 04/23/2022    RDW 13.4 07/01/2021     04/23/2022    MPV 10.0 04/23/2022     BMP:    Lab Results   Component Value Date     04/23/2022    K 4.0 04/23/2022     04/23/2022    CO2 25 04/23/2022    BUN 12 04/23/2022    LABALBU 4.3 04/22/2022    CREATININE 0.6 04/23/2022    CALCIUM 8.4 04/23/2022    LABGLOM >90 04/23/2022    GLUCOSE 133 04/23/2022    GLUCOSE 214 06/21/2021     Hepatic Function Panel:    Lab Results   Component Value Date    [facility] 86 04/22/2022    ALT 22 04/22/2022    AST 20 04/22/2022    PROT 7.3 04/22/2022    BILITOT 0.2 04/22/2022    LABALBU 4.3 04/22/2022     Magnesium:  No results found for: MG  Warfarin PT/INR:  No components found for: PTPATWAR, PTINRWAR  HgBA1c:    Lab Results   Component Value Date    LABA1C 6.4 04/23/2022     FLP:    Lab Results   Component Value Date    TRIG 126 04/23/2022    HDL 33 04/23/2022    LDLCALC 108 04/23/2022     TSH:    Lab Results   Component Value Date    TSH 2.600 05/26/2021       Conclusions      Summary   Normal left ventricle size and systolic function. Ejection fraction was   estimated at 60 %. There were no regional left ventricular wall motion   abnormalities and wall thickness was within normal limits. Doppler parameters were consistent with abnormal left ventricular   relaxation (grade 1 diastolic dysfunction).       Signature      ----------------------------------------------------------------   Electronically signed by Omega Sanders MD (Interpreting   physician) on 05/04/2022 at 07:03 PM   ----------------------------------------------------------------        Peak HR:146 bpm                      HR response: Appropriate    Peak BP:184/78 mmHg                  BP response: Normal Resting BP with    Predicted HR: 152 bpm                appropriate response to Stress    % of predicted HR: 96                HR/BP product:45430    Test duration:7.67 min               Max exercise: 10.1 METS    Reason for termination:Target HR    achieved                             Exercise effort:Fair         Conclusions        Summary    Exercise EKG stress test is not suggestive for ischemia.    The nuclear images is not suggestive for myocardial ischemia.        Signatures        ----------------------------------------------------------------    Electronically signed by Omega Sanders MD (Interpreting    Cardiologist) on 04/23/2022 at 14:04  ----------------------------------------------------------------         ekg 4/23/22  NSR. KIKO, no acute abn      Assessment   Diagnosis Orders   1. Primary hypertension     2. History of chest pain           Recent admission Dx    ASSESSMENT:  1. Chest pain, atypical in nature, only a few seconds, this was  positional and cough-induced. 2.  Abnormal EKG with incomplete right bundle branch block, left  anterior hemiblock. 3.  Hypertension, well controlled, stable. 4.  History of benign prostatic hypertrophy, status post prostatectomy. 5.  History of inguinal hernia repair. 6.  Non-smoker. No family history of coronary artery disease    Plan     meds  And lab and hospital record reviewed    Continue the current treatment and with constant vigilance to changes in symptoms and also any potential side effects. Return for care or seek medical attention immediately if symptoms got worse and/or develop new symptoms. Hypertension, on medical treatment. Seems to be under good control. Patient is compliant with medical treatment.      No more cp    Stress test and echo WNL and  D/ w the pat the pat    Cont asa 81  For a while 6  To 12 month and may stop if remain cp free    F/u with PCP    RTC as needed      Yuliet Jeffery, [facility]

## 2022-06-10 ENCOUNTER — OFFICE VISIT (OUTPATIENT)
Dept: SURGERY | Age: 67
End: 2022-06-10

## 2022-06-10 VITALS
DIASTOLIC BLOOD PRESSURE: 86 MMHG | HEART RATE: 81 BPM | RESPIRATION RATE: 20 BRPM | OXYGEN SATURATION: 98 % | WEIGHT: 232 LBS | HEIGHT: 70 IN | BODY MASS INDEX: 33.21 KG/M2 | TEMPERATURE: 97.4 F | SYSTOLIC BLOOD PRESSURE: 120 MMHG

## 2022-06-10 DIAGNOSIS — R10.31 RIGHT GROIN PAIN: ICD-10-CM

## 2022-06-10 DIAGNOSIS — Z98.890 S/P RIGHT INGUINAL HERNIORRHAPHY: Primary | ICD-10-CM

## 2022-06-10 DIAGNOSIS — Z87.19 S/P RIGHT INGUINAL HERNIORRHAPHY: Primary | ICD-10-CM

## 2022-06-10 PROCEDURE — 99024 POSTOP FOLLOW-UP VISIT: CPT | Performed by: SURGERY

## 2022-06-10 NOTE — [place]  [address] [facility]. [address]  [place] [address]  [phone]     Pt Name: Edda Morin Record Number: [id number]  Date of Birth 1955   Today's Date: 6/10/2022    Elaine Gutierrez was evaluated in the office today. My assessment and plans are listed below. ASSESSMENT      Diagnosis Orders   1. S/P right inguinal herniorrhaphy      2/17/22   2. Right groin pain     Incision is clean, dry and intact or healing as expected  PLANS:   Unable to palpate a recurrent hernia. US completed showing no evidence of recurrent hernia  Trigger point injection performed today in office. Follow up: Return in about 2 weeks (around 6/24/2022). If I can provide any additional assistance or you have any concerns, please feel free to contact me. Thank you for allowing to participate in the care of your patients. Sincerely,      Parvin Hernandez.  Arya Zimmerman

## 2022-06-10 NOTE — PROGRESS NOTES
Nelly Zimmerman, [address] [place] [id number]  [phone]  Post Procedure Evaluation in Office    Pt Name: Layla Anderson  Date of Birth 1955   Today's Date: 6/10/2022  Medical Record Number: [id number]  Referring Provider: No ref. provider found  Primary Care Provider: Maurizio Benson DO  Chief Complaint   Patient presents with   [facility] Surgical Consult     S/P right inguinal herniorrhaphy 2/17/22, right groin pain-Last office visit 4/19/22     ASSESSMENT       Diagnosis Orders   1. S/P right inguinal herniorrhaphy      2/17/22   2. Right groin pain     Incision is clean, dry and intact or healing as expected   PLANS      Unable to palpate a recurrent hernia. US completed showing no evidence of recurrent hernia  Trigger point injection performed today in office. Follow up: Return in about 2 weeks (around 6/24/2022). Rosita Hernandez is seen today for post-op follow-up. He is S/P right inguinal hernia repair 2/17/22. He is tolerating a regular diet, having regular bowel movements. Symptoms and activity have gradually improved compared to preoperative. The surgical site is clean and has no drainage. About a dozen times over the past month, he gets a very short duration sting that occurs when going from sitting to a standing position. It is located in the medial thigh. Past Medical History  Past Medical History:   Diagnosis Date    BPH with elevated PSA 07/17/2015    Follows with urology for this and elevated PSA    Dermatitis     follows with Dr. Marco trevino Erectile dysfunction     Hypertension     Marisol    Obesity (BMI 30-39. 9)     Prediabetes 07/29/2016     Past Surgical History  Past Surgical History:   Procedure Laterality Date    CARDIOVASCULAR STRESS TEST  2022    COLONOSCOPY  2007    Dr. Jackie Ryan N/A 02/17/2022    RIGHT INGUINAL HERNIA REPAIR WITH MESH performed by Ottoniel Mercado DO at [facility]  2005    Dr Phan-holminum laser ablation of prostate    PROSTATECTOMY N/A 07/01/2021    Robotic-assisted laparoscopic suprapubic prostatectomy @ [facility]    TRANSTHORACIC ECHOCARDIOGRAM  2022     Medications  Current Outpatient Medications   Medication Sig Dispense Refill    amLODIPine (NORVASC) 10 MG tablet Take 1 tablet by mouth once daily 90 tablet 3    aspirin 81 MG chewable tablet Take 1 tablet by mouth daily 30 tablet 3    Calcium Carbonate (CALCIUM 600 PO) Take 1 tablet by mouth in the morning and at bedtime      vitamin D (CHOLECALCIFEROL) 25 MCG (1000 UT) TABS tablet Take 1,000 Units by mouth daily      ascorbic acid (VITAMIN C) 500 MG tablet Take 500 mg by mouth daily      zinc 50 MG CAPS Take by mouth      losartan (COZAAR) 100 MG tablet TAKE 1 TABLET DAILY 90 tablet 3    lidocaine (XYLOCAINE) 5 % ointment Apply topically as needed. 50 g 1    nystatin (MYCOSTATIN) 274500 UNIT/GM cream Apply topically as needed Apply topically 2 times daily. As needed      VIAGRA 100 MG TABS        No current facility-administered medications for this visit. Allergies  has No Known Allergies. Social History   reports that he has never smoked. He has never used smokeless tobacco. He reports that he does not drink alcohol and does not use drugs.   Health Screening Exams  Health Maintenance   Topic Date Due    Depression Screen  05/26/2022    Annual Wellness Visit (AWV)  05/27/2022    Prostate Specific Antigen (PSA) Screening or Monitoring  04/02/2023    A1C test (Diabetic or Prediabetic)  04/23/2023    Lipids  04/23/2023    Colorectal Cancer Screen  06/17/2023    DTaP/Tdap/Td vaccine (2 - Td or Tdap) 07/17/2025    Flu vaccine  Completed    Shingles vaccine  Completed    Pneumococcal 65+ years Vaccine  Completed    COVID-19 Vaccine  Completed    Hepatitis C screen  Completed    Hepatitis A vaccine  Aged Out    Hepatitis B vaccine  Aged Out    Hib vaccine  Aged Madi Sanchez Meningococcal (ACWY) vaccine  Aged Out     Review of Systems  History obtained from the patient. Constitutional: Denies any fever, chills, fatigue. Wound: Denies any rash, skin color changes or wound problems. Resp: Denies any cough, shortness of breath. CV: Denies any chest pain, orthopnea or syncope. GI: Denies any nausea, vomiting, blood in the stool, constipation or diarrhea. : Denies any hematuria, hesitancy or dysuria. OBJECTIVE    VITALS:  height is 5' 10\" (1.778 m) and weight is 232 lb (105.2 kg). His temporal temperature is 97.4 °F (36.3 °C). His blood pressure is 120/86 and his pulse is 81. His respiration is 20 and oxygen saturation is 98%. Pain Score:   5  CONSTITUTIONAL: Alert and oriented times 3, no acute distress and cooperative to examination. SKIN: Skin color, texture, turgor normal. No rashes or lesions. INCISION: wound margins intact and healing well. No signs of infection. No drainage. ABDOMEN: Soft, nondistended, no masses or organomegaly. Bowel sounds normal. right groin scar(s) present. No sign of recurrence, hematoma or seroma. Pain is in the medial thigh but not upon palpation. MALE : bilateral testes normal, no scrotal swelling or edema     A steroid injection was performed over the pubic tubercle using 1% plain Lidocaine and 20 mg of Kenalog. This was well tolerated. Thank you for the interesting evaluation. Further recommendations as listed above.        Electronically signed by Apolinar Garcia DO on 6/10/2022 at 1:09 PM

## 2022-06-16 ENCOUNTER — OFFICE VISIT (OUTPATIENT)
Dept: FAMILY MEDICINE CLINIC | Age: 67
End: 2022-06-16
Payer: MEDICARE

## 2022-06-16 VITALS
BODY MASS INDEX: 33.62 KG/M2 | OXYGEN SATURATION: 96 % | HEART RATE: 64 BPM | WEIGHT: 227 LBS | HEIGHT: 69 IN | RESPIRATION RATE: 12 BRPM | SYSTOLIC BLOOD PRESSURE: 122 MMHG | TEMPERATURE: 97.9 F | DIASTOLIC BLOOD PRESSURE: 64 MMHG

## 2022-06-16 DIAGNOSIS — M54.50 ACUTE RIGHT-SIDED LOW BACK PAIN WITHOUT SCIATICA: Primary | ICD-10-CM

## 2022-06-16 PROCEDURE — 96372 THER/PROPH/DIAG INJ SC/IM: CPT | Performed by: FAMILY MEDICINE

## 2022-06-16 PROCEDURE — 99213 OFFICE O/P EST LOW 20 MIN: CPT | Performed by: FAMILY MEDICINE

## 2022-06-16 PROCEDURE — G8427 DOCREV CUR MEDS BY ELIG CLIN: HCPCS | Performed by: FAMILY MEDICINE

## 2022-06-16 PROCEDURE — 1123F ACP DISCUSS/DSCN MKR DOCD: CPT | Performed by: FAMILY MEDICINE

## 2022-06-16 PROCEDURE — 1036F TOBACCO NON-USER: CPT | Performed by: FAMILY MEDICINE

## 2022-06-16 PROCEDURE — G8417 CALC BMI ABV UP PARAM F/U: HCPCS | Performed by: FAMILY MEDICINE

## 2022-06-16 PROCEDURE — 3017F COLORECTAL CA SCREEN DOC REV: CPT | Performed by: FAMILY MEDICINE

## 2022-06-16 RX ORDER — TIZANIDINE 4 MG/1
4 TABLET ORAL 3 TIMES DAILY PRN
Qty: 45 TABLET | Refills: 0 | Status: SHIPPED | OUTPATIENT
Start: 2022-06-16

## 2022-06-16 RX ORDER — KETOROLAC TROMETHAMINE 30 MG/ML
60 INJECTION, SOLUTION INTRAMUSCULAR; INTRAVENOUS ONCE
Status: COMPLETED | OUTPATIENT
Start: 2022-06-16 | End: 2022-06-16

## 2022-06-16 RX ADMIN — KETOROLAC TROMETHAMINE 60 MG: 30 INJECTION, SOLUTION INTRAMUSCULAR; INTRAVENOUS at 14:02

## 2022-06-16 ASSESSMENT — PATIENT HEALTH QUESTIONNAIRE - PHQ9
SUM OF ALL RESPONSES TO PHQ QUESTIONS 1-9: 0
SUM OF ALL RESPONSES TO PHQ9 QUESTIONS 1 & 2: 0
SUM OF ALL RESPONSES TO PHQ QUESTIONS 1-9: 0
1. LITTLE INTEREST OR PLEASURE IN DOING THINGS: 0
2. FEELING DOWN, DEPRESSED OR HOPELESS: 0

## 2022-06-16 NOTE — PROGRESS NOTES
Chief Complaint   Patient presents with    Lower Back Pain     Started monday rt side sharp pain hurts  when getting out of car or chair        History obtained from the patient. SUBJECTIVE:  Isabella Grant is a 74 y.o. male that presents today for     -Low Back Pain:    HPI:   R sided   Just above and on SI joint  Mild pain at rest and standing  Worse when getting out of car, or going from sitting to standing  Does not radiate  No injury  No bowel/bladder issues    Inciting injury or history of trauma? No  Pain is relieved by - as above  Pain is aggravated by - as above  Radiation of the pain? No  Paresthesias of the extremities? No  Saddle anesthesia? No  Bowel or bladder incontinence?  No  Treatments tried - heat      Age/Gender Health Maintenance    Lipid -   Lab Results   Component Value Date    CHOL 166 04/23/2022    CHOL 166 05/26/2021    CHOL 174 08/17/2020     Lab Results   Component Value Date    TRIG 126 04/23/2022    TRIG 105 05/26/2021    TRIG 124 08/17/2020     Lab Results   Component Value Date    HDL 33 04/23/2022    HDL 37 05/26/2021    HDL 36 08/17/2020     Lab Results   Component Value Date    LDLCALC 108 04/23/2022    LDLCALC 108 05/26/2021    LDLCALC 113 08/17/2020       DM Screen -   Lab Results   Component Value Date    GLUCOSE 133 04/23/2022    GLUCOSE 214 06/21/2021     Lab Results   Component Value Date    LABA1C 6.4 04/23/2022    LABA1C 6.3 11/20/2021    LABA1C 6.2 05/26/2021    LABA1C 6.0 05/21/2020    LABA1C 6.4 02/21/2020    LABA1C 6.1 08/12/2019       113 (OCT 2015)/110 (JUNE 2016)/100 (DEC 2017)    With a1c 6.1 (SEPT 2017)    TSH - 4.090 (DEC 2017)    Lab Results   Component Value Date    TSH 2.600 05/26/2021       Colon Cancer Screening - NEG Cologuard June 2020  Lung Cancer Screening (Age 54 to [de-identified] with 30 pack year hx, current smoker or quit within past 15 years) - never smoker    Tetanus - UTD July 2015  Influenza Vaccine - UTD FALL 2021  Pneumonia Vaccine - UTD PCV 13 in AUG 2019/PPV 23 in AUG 2020  Zoster - UTD shingrix x 2    PSA testing discussion - Follows with urology at [facility]    Lab Results   Component Value Date    PSA 1.38 (H) 04/02/2022    PSA 6.53 (H) 10/07/2020    PSA 5.84 (H) 08/02/2019     AAA Screening - never smoker       Current Outpatient Medications   Medication Sig Dispense Refill    tiZANidine (ZANAFLEX) 4 MG tablet Take 1 tablet by mouth 3 times daily as needed (back pain/spasm) 45 tablet 0    amLODIPine (NORVASC) 10 MG tablet Take 1 tablet by mouth once daily 90 tablet 3    aspirin 81 MG chewable tablet Take 1 tablet by mouth daily 30 tablet 3    Calcium Carbonate (CALCIUM 600 PO) Take 1 tablet by mouth in the morning and at bedtime      vitamin D (CHOLECALCIFEROL) 25 MCG (1000 UT) TABS tablet Take 1,000 Units by mouth daily      ascorbic acid (VITAMIN C) 500 MG tablet Take 500 mg by mouth daily      zinc 50 MG CAPS Take by mouth      losartan (COZAAR) 100 MG tablet TAKE 1 TABLET DAILY 90 tablet 3    lidocaine (XYLOCAINE) 5 % ointment Apply topically as needed. 50 g 1    nystatin (MYCOSTATIN) 451398 UNIT/GM cream Apply topically as needed Apply topically 2 times daily. As needed      VIAGRA 100 MG TABS        No current facility-administered medications for this visit. Orders Placed This Encounter   Medications    tiZANidine (ZANAFLEX) 4 MG tablet     Sig: Take 1 tablet by mouth 3 times daily as needed (back pain/spasm)     Dispense:  45 tablet     Refill:  0    ketorolac (TORADOL) injection 60 mg         All medications reviewed and reconciled, including OTC and herbal medications. Updated list given to patient. Patient Active Problem List   Diagnosis    Dermatitis    Hypertension    Erectile dysfunction    BPH with elevated PSA    Prediabetes    Obesity (BMI 30-39. 9)    Chest pain    History of chest pain       Past Medical History:   Diagnosis Date    BPH with elevated PSA 07/17/2015    Follows with urology for this and elevated PSA    Dermatitis     follows with Dr. Colt trevino Erectile dysfunction     Hypertension     Marisol    Obesity (BMI 30-39. 9)     Prediabetes 07/29/2016       Past Surgical History:   Procedure Laterality Date    CARDIOVASCULAR STRESS TEST  2022    COLONOSCOPY  2007    Dr. Florentin Baez N/A 02/17/2022    RIGHT INGUINAL HERNIA REPAIR WITH MESH performed by Bev Choudhary DO at [facility]  2005    Dr Lexa Franz ablation of prostate    PROSTATECTOMY N/A 07/01/2021    Robotic-assisted laparoscopic suprapubic prostatectomy @ [facility]    TRANSTHORACIC ECHOCARDIOGRAM  2022         No Known Allergies      Social History     Tobacco Use    Smoking status: Never Smoker    Smokeless tobacco: Never Used   Substance Use Topics    Alcohol use: No       Family History   Problem Relation Age of Onset    High Blood Pressure Mother     Cancer Father         Lung Cancer    Colon Cancer Neg Hx     Prostate Cancer Neg Hx          I have reviewed the patient's past medical history, past surgical history, allergies, medications, social and family history and I have made updates where appropriate.       Review of Systems  Positive responses are highlighted in bold    Constitutional:  Fever, Chills, Night Sweats, Fatigue, Unexpected changes in weight  HENT:  Ear pain, Tinnitus, Nosebleeds, Trouble swallowing, Hearing loss, Sore throat  Cardiovascular:  Chest Pain, Palpitations, Orthopnea, Paroxysmal Nocturnal Dyspnea  Respiratory:  Cough, Wheezing, Shortness of breath, Chest tightness, Apnea  Gastrointestinal:  Nausea, Vomiting, Diarrhea, Constipation, Heartburn, Blood in stool  Genitourinary:  Difficulty or painful urination, Flank pain, Change in frequency, Urgency  Skin:  Color change, Rash, Itching, Wound  Musculoskeletal:  Joint pain, Back pain, Gait problems, Joint swelling, Myalgias  Neurological:  Dizziness, Headaches, Presyncope, Numbness, Seizures, Tremors  Endocrine:  Heat Intolerance, Cold Intolerance, Polydipsia, Polyphagia, Polyuria      PHYSICAL EXAM:  Vitals:    06/16/22 1341   BP: 122/64   Pulse: 64   Resp: 12   Temp: 97.9 °F (36.6 °C)   TempSrc: Oral   SpO2: 96%   Weight: 227 lb (103 kg)   Height: 5' 8.5\" (1.74 m)     Body mass index is 34.01 kg/m². VS Reviewed  General Appearance: A&O x 3, No acute distress,well developed and well- nourished  Eyes: pupils equal, round, and reactive to light, extraocular eye movements intact, conjunctivae and eye lids without erythema  ENT: external ear and ear canal clear bilaterally, TMs intact and regular, nose without deformity, nasal mucosa and turbinates normal without polyps, oropharynx normal, dentition is normal for age  Neck: supple and non-tender without mass, no thyromegaly or thyroid nodules, no cervical lymphadenopathy  Pulmonary/Chest: clear to auscultation bilaterally- no wheezes, rales or rhonchi, normal air movement, no respiratory distress or retractions  Cardiovascular: S1 and S2 auscultated w/ RRR. No murmurs, rubs, clicks, or gallops, distal pulses intact. Abdomen: soft, non-tender, non-distended, bowel sounds physiologic,  no rebound or guarding, no masses or hernias noted. Liver and spleen without enlargement. Extremities: no cyanosis, clubbing or edema of the lower extremities. Skin: warm and dry, no rash or erythema  LUMBAR SPINE EXAM:  Examination of the Lumbar spine shows:  Deformity Absent . Soft Tissue Swelling Absent . Soft Tissue Tenderness Present. Midline Bone Tenderness Absent . Paraspinal Muscular Spasm Present. Previous Incisions Absent . Erythema Absent . Lumbar Flexion does produce pain. Lumbar Extension does produce pain. NEUROLOGICAL EXAM:  SLR     Left: Negative. Right Negative. DTR 2+ bilaterally  Parish's Sign Absent   Gait normal Heel/ Toe.   Sensation to Touch normal    LE strength    Right Left   Hip Flexors 5/5 5/5   Hip Extensors 5/5 5/5   Knee Flexors 5/5 5/5   Knee Extensors 5/5 5/5   Ankle Flexors 5/5 5/5   Ankle Extensors 5/5 5/5   Extensor Hallicus Longus 5/5 5/5   Dorsiflexors 5/5 5/5     Sensation:  T12 100% 100%   L1 100% 100%   L2 100% 100%   L3 100% 100%   L4 100% 100%   L5 100% 100%   S1 100% 100%   S2 100% 100%   S3-S5 100% 100%       ASSESSMENT & PLAN  1. Acute right-sided low back pain without sciatica    Heating pad  HEP  Prn motrin  IM dose of toradol x 1  zanaflex  F/u if no better  Reviewed ER precautions, pt understands. - tiZANidine (ZANAFLEX) 4 MG tablet; Take 1 tablet by mouth 3 times daily as needed (back pain/spasm)  Dispense: 45 tablet; Refill: 0  - ketorolac (TORADOL) injection 60 mg      DISPOSITION    Return if symptoms worsen or fail to improve. Vickii Josue released without restrictions. Future Appointments   Date Time Provider Ju Merai   6/28/2022 10:00 AM Tank Florence DO N Adv Surg [facility]   8/24/2022  9:00 AM Mya Russell DO [facility]     PATIENT COUNSELING    Barriers to learning and self management: none    Discussed use, benefit, and side effects of prescribed medications. Barriers to medication compliance addressed. All patient questions answered. Pt voiced understanding.        Electronically signed by Mya Russell DO on 6/16/2022 at 2:24 PM

## 2022-06-16 NOTE — PROGRESS NOTES
Administrations This Visit     ketorolac (TORADOL) injection 60 mg     Admin Date  06/16/2022  14:02 Action  Given Dose  60 mg Route  IntraMUSCular Site  Ventrogluteal Right Administered By  Rah Houser LPN    Ordering Provider: Nohemi Webber DO    NDC: 0240-3414-01    Lot#: [id number]    : CARROLL Rodriguez 9    Patient Supplied?: No                   Advised pt to report any adverse reactions.

## 2022-06-28 ENCOUNTER — OFFICE VISIT (OUTPATIENT)
Dept: SURGERY | Age: 67
End: 2022-06-28
Payer: MEDICARE

## 2022-06-28 VITALS
WEIGHT: 228.2 LBS | HEIGHT: 68 IN | SYSTOLIC BLOOD PRESSURE: 138 MMHG | HEART RATE: 73 BPM | RESPIRATION RATE: 16 BRPM | OXYGEN SATURATION: 96 % | BODY MASS INDEX: 34.59 KG/M2 | TEMPERATURE: 97.7 F | DIASTOLIC BLOOD PRESSURE: 80 MMHG

## 2022-06-28 DIAGNOSIS — Z87.19 S/P RIGHT INGUINAL HERNIORRHAPHY: Primary | ICD-10-CM

## 2022-06-28 DIAGNOSIS — Z98.890 S/P RIGHT INGUINAL HERNIORRHAPHY: Primary | ICD-10-CM

## 2022-06-28 DIAGNOSIS — R10.31 RIGHT GROIN PAIN: ICD-10-CM

## 2022-06-28 PROCEDURE — G8417 CALC BMI ABV UP PARAM F/U: HCPCS | Performed by: SURGERY

## 2022-06-28 PROCEDURE — 99211 OFF/OP EST MAY X REQ PHY/QHP: CPT | Performed by: SURGERY

## 2022-06-28 PROCEDURE — G8427 DOCREV CUR MEDS BY ELIG CLIN: HCPCS | Performed by: SURGERY

## 2022-06-28 NOTE — LETTER
[facility],   [address] [place] [address]. [address]  [place] [address]  [phone]     Pt Name: Agus Hernandez Record Number: [id number]  Date of Birth 1955   Today's Date: 6/28/2022    Lacy Jansen was evaluated in the office today. My assessment and plans are listed below. ASSESSMENT      Diagnosis Orders   1. S/P right inguinal herniorrhaphy     2. Right groin pain      2/17/22      PLANS:   Pt has had 100% relief with the trigger point injection. Follow up: Return for As needed. Instructed to call if any concerns. If I can provide any additional assistance or you have any concerns, please feel free to contact me. Thank you for allowing to participate in the care of your patients. Sincerely,      Zaheer Dawson.  Arya Zimmerman

## 2022-06-28 NOTE — PROGRESS NOTES
Zaheer Dawson. [facility], [address] [place] [address]  [phone]  Established Patient Evaluation in Office    Pt Name: Rich Griffin  Date of Birth 1955   Today's Date: 6/28/2022  Medical Record Number: [id number]  Referring Provider: No ref. provider found  Primary Care Provider: Tami Kiser DO  Chief Complaint   Patient presents with    Follow Up After Procedure     S/P right inguinal herniorrhaphy 2/17/22, right groin pain-Trigger point injection 6/10/22     ASSESSMENT       Diagnosis Orders   1. S/P right inguinal herniorrhaphy     2. Right groin pain      2/17/22       PLANS      Pt has had 100% relief with the trigger point injection. Follow up: Return for As needed. Instructed to call if any concerns. Jia Louis is seen today for post-op follow-up. He is S/P right inguinal hernia repair 2/17/22. He is tolerating a regular diet, having regular bowel movements. Symptoms and activity have gradually improved compared to preoperative. The surgical site is clean and has no drainage. About a dozen times over the past month, he gets a very short duration sting that occurs when going from sitting to a standing position. It is located in the medial thigh. Trigger poin injection performed last office visit has provided 100% relief. Past Medical History  Past Medical History:   Diagnosis Date    BPH with elevated PSA 07/17/2015    Follows with urology for this and elevated PSA    Dermatitis     follows with Dr. Ryan trevino Erectile dysfunction     Hypertension     Marisol    Obesity (BMI 30-39. 9)     Prediabetes 07/29/2016     Past Surgical History  Past Surgical History:   Procedure Laterality Date    CARDIOVASCULAR STRESS TEST  2022    COLONOSCOPY  2007    Dr. Wanda Nix N/A 02/17/2022    RIGHT INGUINAL HERNIA REPAIR WITH MESH performed by ySd Patel DO at [address] [place] [address] 2005    Dr Phan-connorinum laser ablation of prostate    PROSTATECTOMY N/A 07/01/2021    Robotic-assisted laparoscopic [facility]rapubic prostatectomy @ [facility]    TRANSTHORACIC ECHOCARDIOGRAM  2022     Medications  Current Outpatient Medications   Medication Sig Dispense Refill    tiZANidine (ZANAFLEX) 4 MG tablet Take 1 tablet by mouth 3 times daily as needed (back pain/spasm) 45 tablet 0    amLODIPine (NORVASC) 10 MG tablet Take 1 tablet by mouth once daily 90 tablet 3    aspirin 81 MG chewable tablet Take 1 tablet by mouth daily 30 tablet 3    Calcium Carbonate (CALCIUM 600 PO) Take 1 tablet by mouth in the morning and at bedtime      vitamin D (CHOLECALCIFEROL) 25 MCG (1000 UT) TABS tablet Take 1,000 Units by mouth daily      ascorbic acid (VITAMIN C) 500 MG tablet Take 500 mg by mouth daily      zinc 50 MG CAPS Take by mouth      losartan (COZAAR) 100 MG tablet TAKE 1 TABLET DAILY 90 tablet 3    lidocaine (XYLOCAINE) 5 % ointment Apply topically as needed. 50 g 1    nystatin (MYCOSTATIN) 755016 UNIT/GM cream Apply topically as needed Apply topically 2 times daily. As needed      VIAGRA 100 MG TABS        No current facility-administered medications for this visit. Allergies  has No Known Allergies. Social History   reports that he has never smoked. He has never used smokeless tobacco. He reports that he does not drink alcohol and does not use drugs.   Health Screening Exams  Health Maintenance   Topic Date Due    Annual Wellness Visit (AWV)  05/27/2022    Prostate Specific Antigen (PSA) Screening or Monitoring  04/02/2023    A1C test (Diabetic or Prediabetic)  04/23/2023    Lipids  04/23/2023    Depression Screen  06/16/2023    Colorectal Cancer Screen  06/17/2023    DTaP/Tdap/Td vaccine (2 - Td or Tdap) 07/17/2025    Flu vaccine  Completed    Shingles vaccine  Completed    Pneumococcal 65+ years Vaccine  Completed    COVID-19 Vaccine  Completed    Hepatitis C screen  Completed    Hepatitis A vaccine  Aged Out    Hepatitis B vaccine  Aged Out    Hib vaccine  Aged Out    Meningococcal (ACWY) vaccine  Aged Out     Review of Systems  History obtained from the patient. Constitutional: Denies any fever, chills, fatigue. Wound: Denies any rash, skin color changes or wound problems. Resp: Denies any cough, shortness of breath. CV: Denies any chest pain, orthopnea or syncope. GI: Denies any nausea, vomiting, blood in the stool, constipation or diarrhea. : Denies any hematuria, hesitancy or dysuria. OBJECTIVE    VITALS:  height is 5' 8\" (1.727 m) and weight is 228 lb 3.2 oz (103.5 kg). His temporal temperature is 97.7 °F (36.5 °C). His blood pressure is 138/80 and his pulse is 73. His respiration is 16 and oxygen saturation is 96%. Pain Score:   0 - No pain  CONSTITUTIONAL: Alert and oriented times 3, no acute distress and cooperative to examination. SKIN: Skin color, texture, turgor normal. No rashes or lesions. INCISION: wound margins intact and healing well. No signs of infection. No drainage. ABDOMEN: Soft, nondistended, no masses or organomegaly. Bowel sounds normal. right groin scar(s) present. No sign of recurrence, hematoma or seroma. Thank you for the interesting evaluation. Further recommendations as listed above.        Electronically signed by Apolinar Garcia DO on 6/28/2022 at 10:07 AM

## 2022-07-14 ENCOUNTER — TELEPHONE (OUTPATIENT)
Dept: SURGERY | Age: 67
End: 2022-07-14

## 2022-07-14 ENCOUNTER — PATIENT MESSAGE (OUTPATIENT)
Dept: FAMILY MEDICINE CLINIC | Age: 67
End: 2022-07-14

## 2022-07-14 NOTE — TELEPHONE ENCOUNTER
Patient called stating that his pain is back in the right groin. He describes it as sharp pain that hinders him walking. He took 2-pain pills to see if that would help with the pain and he could barely walk to get to the cabinet to get them. He wanted an appointment as soon as possible with Dr. Anthony Hurst. Patient was advised that Dr. Anthony Hurst is out of the office and that the first available appointment would be 08-02. Appointment scheduled. He will call back if this pain does not get better.

## 2022-07-14 NOTE — TELEPHONE ENCOUNTER
Pt called in, complaining of right groin pain that did improve after steroid injection but today when he stood from his chair the pain  Started again, voices very sharp constant with ambulation. Having difficulty with getting around. Voices he hasn't lifted anything, stands quite a bit at work but no lifting. Any recommendations for pain control? Pt has Zanaflex prescribed by Dr. Gini Lorenz.

## 2022-07-15 ENCOUNTER — OFFICE VISIT (OUTPATIENT)
Dept: FAMILY MEDICINE CLINIC | Age: 67
End: 2022-07-15
Payer: MEDICARE

## 2022-07-15 VITALS
BODY MASS INDEX: 33.91 KG/M2 | TEMPERATURE: 97.9 F | RESPIRATION RATE: 12 BRPM | OXYGEN SATURATION: 95 % | SYSTOLIC BLOOD PRESSURE: 104 MMHG | WEIGHT: 223 LBS | HEART RATE: 74 BPM | DIASTOLIC BLOOD PRESSURE: 60 MMHG

## 2022-07-15 DIAGNOSIS — R10.31 RIGHT GROIN PAIN: Primary | ICD-10-CM

## 2022-07-15 PROCEDURE — 99213 OFFICE O/P EST LOW 20 MIN: CPT | Performed by: FAMILY MEDICINE

## 2022-07-15 PROCEDURE — 1123F ACP DISCUSS/DSCN MKR DOCD: CPT | Performed by: FAMILY MEDICINE

## 2022-07-15 PROCEDURE — 96372 THER/PROPH/DIAG INJ SC/IM: CPT | Performed by: FAMILY MEDICINE

## 2022-07-15 RX ORDER — HYDROCODONE BITARTRATE AND ACETAMINOPHEN 5; 325 MG/1; MG/1
1 TABLET ORAL EVERY 4 HOURS PRN
Qty: 30 TABLET | Refills: 0 | Status: SHIPPED | OUTPATIENT
Start: 2022-07-15 | End: 2022-07-20

## 2022-07-15 RX ORDER — METHYLPREDNISOLONE ACETATE 40 MG/ML
80 INJECTION, SUSPENSION INTRA-ARTICULAR; INTRALESIONAL; INTRAMUSCULAR; SOFT TISSUE ONCE
Status: COMPLETED | OUTPATIENT
Start: 2022-07-15 | End: 2022-07-15

## 2022-07-15 RX ORDER — OXYCODONE HYDROCHLORIDE AND ACETAMINOPHEN 5; 325 MG/1; MG/1
1 TABLET ORAL EVERY 4 HOURS PRN
COMMUNITY
End: 2022-09-12

## 2022-07-15 RX ORDER — KETOROLAC TROMETHAMINE 30 MG/ML
60 INJECTION, SOLUTION INTRAMUSCULAR; INTRAVENOUS ONCE
Status: COMPLETED | OUTPATIENT
Start: 2022-07-15 | End: 2022-07-15

## 2022-07-15 RX ADMIN — KETOROLAC TROMETHAMINE 60 MG: 30 INJECTION, SOLUTION INTRAMUSCULAR; INTRAVENOUS at 12:32

## 2022-07-15 RX ADMIN — METHYLPREDNISOLONE ACETATE 80 MG: 40 INJECTION, SUSPENSION INTRA-ARTICULAR; INTRALESIONAL; INTRAMUSCULAR; SOFT TISSUE at 12:33

## 2022-07-15 NOTE — TELEPHONE ENCOUNTER
Future Appointments   Date Time Provider Ju Urbina   7/15/2022 12:00 PM Saint Goodpasture, DO [facility]   8/2/2022  9:30 AM DO SHASHANK Weston Adv Surg [facility]   8/24/2022  9:00 AM Saint Goodpasture, DO [facility] [address]

## 2022-07-15 NOTE — PROGRESS NOTES
Chief Complaint   Patient presents with    Groin Pain     Rt , started yesterday , same area where he had surgery back in [address] [place] [address] . History obtained from the patient. SUBJECTIVE:  Kenneth Ugarte is a 74 y.o. male that presents today for     -R groin pain:  On going, on and off, since hernia repair  Dr. Lynnette Roca has been doing steroid injections into the R groin  Pt called his office yesterday, but Lynnette Roca is on vacation  No other surgeon could see him in office, so was told to come here  Comes and goes  Sharp  No groin swelling  Better wt rest  Worse if goes to stand of bear down  Did improve with steroid injection into area prior.    I have no expertise in that      Age/Gender Health Maintenance    Lipid -   Lab Results   Component Value Date    CHOL 166 04/23/2022    CHOL 166 05/26/2021    CHOL 174 08/17/2020     Lab Results   Component Value Date    TRIG 126 04/23/2022    TRIG 105 05/26/2021    TRIG 124 08/17/2020     Lab Results   Component Value Date    HDL 33 04/23/2022    HDL 37 05/26/2021    HDL 36 08/17/2020     Lab Results   Component Value Date    LDLCALC 108 04/23/2022    LDLCALC 108 05/26/2021    LDLCALC 113 08/17/2020       DM Screen -   Lab Results   Component Value Date/Time    GLUCOSE 133 04/23/2022 06:03 AM    GLUCOSE 214 06/21/2021 12:23 PM     Lab Results   Component Value Date/Time    LABA1C 6.4 04/23/2022 06:03 AM    LABA1C 6.3 11/20/2021 08:40 AM    LABA1C 6.2 05/26/2021 08:26 AM    LABA1C 6.0 05/21/2020 08:51 AM    LABA1C 6.4 02/21/2020 08:37 AM    LABA1C 6.1 08/12/2019 11:12 AM       113 (OCT 2015)/110 (JUNE 2016)/100 (DEC 2017)    With a1c 6.1 (SEPT 2017)    TSH - 4.090 (DEC 2017)    Lab Results   Component Value Date    TSH 2.600 05/26/2021       Colon Cancer Screening - NEG Cologuard June 2020  Lung Cancer Screening (Age 54 to [de-identified] with 30 pack year hx, current smoker or quit within past 15 years) - never smoker    Tetanus - UTD July 2015  Influenza Vaccine - UTD FALL manage pain     Dispense:  30 tablet     Refill:  0     Reduce doses taken as pain becomes manageable           All medications reviewed and reconciled, including OTC and herbal medications. Updated list given to patient. Patient Active Problem List   Diagnosis    Dermatitis    Hypertension    Erectile dysfunction    BPH with elevated PSA    Prediabetes    Obesity (BMI 30-39. 9)    Chest pain    History of chest pain       Past Medical History:   Diagnosis Date    BPH with elevated PSA 07/17/2015    Follows with urology for this and elevated PSA    Dermatitis     follows with Dr. Christine trevino    Erectile dysfunction     Hypertension     Marisol    Obesity (BMI 30-39. 9)     Prediabetes 07/29/2016       Past Surgical History:   Procedure Laterality Date    CARDIOVASCULAR STRESS TEST  2022    COLONOSCOPY  2007    Dr. Marti Silverio N/A 02/17/2022    RIGHT INGUINAL HERNIA REPAIR WITH MESH performed by Bill Mosqueda DO at [address]  2005    Dr Tahir Fernandez ablation of prostate    PROSTATECTOMY N/A 07/01/2021    Robotic-assisted laparoscopic suprapubic prostatectomy @ [facility]    TRANSTHORACIC ECHOCARDIOGRAM  2022         No Known Allergies      Social History     Tobacco Use    Smoking status: Never    Smokeless tobacco: Never   Substance Use Topics    Alcohol use: No       Family History   Problem Relation Age of Onset    High Blood Pressure Mother     Cancer Father         Lung Cancer    Colon Cancer Neg Hx     Prostate Cancer Neg Hx          I have reviewed the patient's past medical history, past surgical history, allergies, medications, social and family history and I have made updates where appropriate.       Review of Systems  Positive responses are highlighted in bold    Constitutional:  Fever, Chills, Night Sweats, Fatigue, Unexpected changes in weight  HENT:  Ear pain, Tinnitus, Nosebleeds, Trouble swallowing, Hearing loss, Sore throat  Cardiovascular:  Chest Pain, Palpitations, Orthopnea, Paroxysmal Nocturnal Dyspnea  Respiratory:  Cough, Wheezing, Shortness of breath, Chest tightness, Apnea  Gastrointestinal:  Nausea, Vomiting, Diarrhea, Constipation, Heartburn, Blood in stool  Genitourinary:  Difficulty or painful urination, Flank pain, Change in frequency, Urgency  Skin:  Color change, Rash, Itching, Wound  Musculoskeletal:  Joint pain, Back pain, Gait problems, Joint swelling, Myalgias  Neurological:  Dizziness, Headaches, Presyncope, Numbness, Seizures, Tremors  Endocrine:  Heat Intolerance, Cold Intolerance, Polydipsia, Polyphagia, Polyuria      PHYSICAL EXAM:  Vitals:    07/15/22 1157   BP: 104/60   Pulse: 74   Resp: 12   Temp: 97.9 °F (36.6 °C)   TempSrc: Oral   SpO2: 95%   Weight: 223 lb (101.2 kg)       Body mass index is 33.91 kg/m². VS Reviewed  General Appearance: A&O x 3, No acute distress,well developed and well- nourished  Eyes: pupils equal, round, and reactive to light, extraocular eye movements intact, conjunctivae and eye lids without erythema  ENT: external ear and ear canal clear bilaterally, TMs intact and regular, nose without deformity, nasal mucosa and turbinates normal without polyps, oropharynx normal, dentition is normal for age  Neck: supple and non-tender without mass, no thyromegaly or thyroid nodules, no cervical lymphadenopathy  Pulmonary/Chest: clear to auscultation bilaterally- no wheezes, rales or rhonchi, normal air movement, no respiratory distress or retractions  Cardiovascular: S1 and S2 auscultated w/ RRR. No murmurs, rubs, clicks, or gallops, distal pulses intact. Abdomen: soft, non-tender, non-distended, bowel sounds physiologic,  no rebound or guarding, no masses or hernias noted. Liver and spleen without enlargement. Extremities: no cyanosis, clubbing or edema of the lower extremities. Skin: warm and dry, no rash or erythema  : mild TTP R inguinal area. No hernia sac appreciated. No scrotal masses.        ASSESSMENT & PLAN  1. Right groin pain    Nerve entrapment vs recurrent hernia  No alarm features    I don't have any expertise into the type of injection Dr. Cruz James did    Will do toradol and depomedrol today  Short course norco  Updated US  F/u surgeon as planned  Reviewed ER precautions, pt understands. OAARS reviewed and appropriate. Controlled Substances Monitoring:     Periodic Controlled Substance Monitoring: Possible medication side effects, risk of tolerance/dependence & alternative treatments discussed., No signs of potential drug abuse or diversion identified. Cameron Mann DO)    - methylPREDNISolone acetate (DEPO-MEDROL) injection 80 mg  - ketorolac (TORADOL) injection 60 mg  - HYDROcodone-acetaminophen (NORCO) 5-325 MG per tablet; Take 1 tablet by mouth every 4 hours as needed for Pain for up to 5 days. Intended supply: 5 days. Take lowest dose possible to manage pain  Dispense: 30 tablet; Refill: 0  - US EXTREMITY RIGHT NON VASC LIMITED; Future      DISPOSITION    Return if symptoms worsen or fail to improve. Sherron Ross released without restrictions. Future Appointments   Date Time Provider Ju Urbina   8/2/2022  9:30 AM Lex Blair DO [facility] [address] [place] [address]   8/24/2022  9:00 AM Cameron Mann DO [address] [facility]     PATIENT COUNSELING    Barriers to learning and self management: none    Discussed use, benefit, and side effects of prescribed medications. Barriers to medication compliance addressed. All patient questions answered. Pt voiced understanding.        Electronically signed by Cameron Mann DO on 7/15/2022 at 6:05 PM

## 2022-07-15 NOTE — TELEPHONE ENCOUNTER
From: Irma Rosario  To: Dr. Governor Arshad: 7/14/2022 8:33 PM EDT  Subject: Groin Pain    Hi Dr Andrew Carreon been working with Dr Jeff Keys following hernia surgery. About a month ago I had sharp pain in the right groin area where the surgery took place & he gave me a steroid shot that helped greatly. Today I was in my home & stood up from sitting in a chair around 1:30 & had sharp pain in the same area the surgery was on. The sharp pain is not going away. I cant walk without pain. I called Dr Regina Joiner office & talked to [place]. She told me to take Motrin & use ice on the area for 20 minute segments. Nothing is helping. They suggested I call you because Dr Jeff Keys is out & I cant see him until 8-2. I just called off work tomorrow because I cant stand or walk. Is there anything I can take to do anything for the pain? I am available anytime if I need to come to your office. Thank you, in advance, for anything that can be done to help.     Gerardo Szymanski

## 2022-07-15 NOTE — PROGRESS NOTES
Medication(s) given during visit:    Administrations This Visit       ketorolac (TORADOL) injection 60 mg       Admin Date  07/15/2022  12:32 Action  Given Dose  60 mg Route  IntraMUSCular Site  Ventrogluteal Right Administered By  Anne Marie Steele LPN    Ordering Provider: Harry Villalba DO    NDC: 8465-0501-48    Lot#: [id number]    : HOSPIRA    Patient Supplied?: No              methylPREDNISolone acetate (DEPO-MEDROL) injection 80 mg       Admin Date  07/15/2022  12:33 Action  Given Dose  80 mg Route  IntraMUSCular Site  Ventrogluteal Left  Administered By  Anne Marie Steele LPN    Ordering Provider: Harry Villalba DO    [address]: [address]-9    Lot#: [id number]    : [address] [place], [place] [address]    Patient Supplied?: No                    Patient instructed to remain in clinic for 20 minutes after injection and was advised to report any adverse reaction to me immediately.

## 2022-07-19 DIAGNOSIS — I10 ESSENTIAL HYPERTENSION: Chronic | ICD-10-CM

## 2022-07-19 RX ORDER — LOSARTAN POTASSIUM 100 MG/1
TABLET ORAL
Qty: 90 TABLET | Refills: 3 | Status: SHIPPED | OUTPATIENT
Start: 2022-07-19

## 2022-07-20 ENCOUNTER — TELEPHONE (OUTPATIENT)
Dept: FAMILY MEDICINE CLINIC | Age: 67
End: 2022-07-20

## 2022-07-20 ENCOUNTER — PATIENT MESSAGE (OUTPATIENT)
Dept: FAMILY MEDICINE CLINIC | Age: 67
End: 2022-07-20

## 2022-07-20 NOTE — TELEPHONE ENCOUNTER
Scheduling  US rt jeniin 07/29/22 @ [address] @ 10:15 to 1st floor radiology   Wear mask ,bring Id and Insurance cards

## 2022-07-21 ENCOUNTER — TELEPHONE (OUTPATIENT)
Dept: FAMILY MEDICINE CLINIC | Age: 67
End: 2022-07-21

## 2022-07-21 DIAGNOSIS — R10.31 RIGHT GROIN PAIN: Primary | ICD-10-CM

## 2022-07-21 NOTE — TELEPHONE ENCOUNTER
Needs a new order Changed to  ABD limited . Because looking for the hernia .      Pt has appt @ 11:20 today at [facility]     Fax new order to [phone]

## 2022-07-22 ENCOUNTER — TELEPHONE (OUTPATIENT)
Dept: FAMILY MEDICINE CLINIC | Age: 67
End: 2022-07-22

## 2022-07-22 NOTE — TELEPHONE ENCOUNTER
----- Message from HAROLDO Carballo CNP sent at 7/22/2022  7:32 AM EDT -----  Let pt know his U/S did not identify any abnormality, no hernia or extra fluid collection

## 2022-07-22 NOTE — TELEPHONE ENCOUNTER
----- Message from Renell Osler, APRN - CNP sent at 7/22/2022  7:32 AM EDT -----  Let pt know his U/S did not identify any abnormality, no hernia or extra fluid collection

## 2022-07-27 ENCOUNTER — TELEPHONE (OUTPATIENT)
Dept: FAMILY MEDICINE CLINIC | Age: 67
End: 2022-07-27

## 2022-07-27 NOTE — TELEPHONE ENCOUNTER
Pt states he is still having pain in the right groin area close to the incision site of hernia repair  Pt has an appt with Dr Ryland Carmona on 8/2/22    The pain is not lasting as long as before, the pain comes and goes away in 1-2 minutes after sitting down    Pt was wondering if a CT would be helpful

## 2022-07-27 NOTE — TELEPHONE ENCOUNTER
It may, but I would recommend that we let Dr. Fabiola Morgan determine if imaging is needed and if so, what would be the best at this point. Let me know if questions, thanks!

## 2022-07-28 NOTE — TELEPHONE ENCOUNTER
From: TIMOTHY CARPIO  To: Vin Delong  Sent: 7/20/2022 8:24 AM EDT  Subject: Scheduling US rt groin 07/29/22 - [address] rt groin 07/29/22 @ [address] @ 10:15 to 1st floor radiology   Wear mask ,bring Id and Insurance cards

## 2022-08-02 ENCOUNTER — OFFICE VISIT (OUTPATIENT)
Dept: SURGERY | Age: 67
End: 2022-08-02
Payer: MEDICARE

## 2022-08-02 VITALS
DIASTOLIC BLOOD PRESSURE: 74 MMHG | BODY MASS INDEX: 29.63 KG/M2 | TEMPERATURE: 97.3 F | OXYGEN SATURATION: 96 % | HEART RATE: 66 BPM | WEIGHT: 207 LBS | HEIGHT: 70 IN | SYSTOLIC BLOOD PRESSURE: 138 MMHG

## 2022-08-02 DIAGNOSIS — Z98.890 S/P RIGHT INGUINAL HERNIORRHAPHY: ICD-10-CM

## 2022-08-02 DIAGNOSIS — R10.31 RIGHT GROIN PAIN: Primary | ICD-10-CM

## 2022-08-02 DIAGNOSIS — Z87.19 S/P RIGHT INGUINAL HERNIORRHAPHY: ICD-10-CM

## 2022-08-02 PROCEDURE — G8417 CALC BMI ABV UP PARAM F/U: HCPCS | Performed by: SURGERY

## 2022-08-02 PROCEDURE — 1036F TOBACCO NON-USER: CPT | Performed by: SURGERY

## 2022-08-02 PROCEDURE — 3017F COLORECTAL CA SCREEN DOC REV: CPT | Performed by: SURGERY

## 2022-08-02 PROCEDURE — 1123F ACP DISCUSS/DSCN MKR DOCD: CPT | Performed by: SURGERY

## 2022-08-02 PROCEDURE — G8427 DOCREV CUR MEDS BY ELIG CLIN: HCPCS | Performed by: SURGERY

## 2022-08-02 PROCEDURE — 99212 OFFICE O/P EST SF 10 MIN: CPT | Performed by: SURGERY

## 2022-08-02 NOTE — LETTER
Racheal Kat,   [address] [facility]. [address]  [place] [address]  [phone]     Pt Name: Beck Harden Record Number: [id number]  Date of Birth 1955   Today's Date: 8/2/2022    Libia Clark was evaluated in the office today. My assessment and plans are listed below. ASSESSMENT      Diagnosis Orders   1. Right groin pain  Magalys Olguin MD, Pain Medicine, [facility] [place]      2. S/P right inguinal herniorrhaphy  Latrice Bermudez MD, Pain Medicine, [address]:   Pt returned despite trigger point injection. Options of formal nerve block discussed with pt. Follow up: Return for Referral to pain clinic for possible formal block will be scheduled. If I can provide any additional assistance or you have any concerns, please feel free to contact me. Thank you for allowing to participate in the care of your patients. Sincerely,      Yeyo Rocha.  Arya Zimmerman

## 2022-08-02 NOTE — PROGRESS NOTES
José Miguel Santos. [facility], [address] [place] [address]  [phone]  Established Patient Evaluation in Office    Pt Name: Quin Hemphill  Date of Birth 1955   Today's Date: 8/2/2022  Medical Record Number: [id number]  Referring Provider: No ref. provider found  Primary Care Provider: Maurice Crawford DO  Chief Complaint   Patient presents with    Follow-up     s/p right inguinal hernia repair 2/17/22, last office visit 4/19/22. Pain in the right groin area close to the incision site of hernia repair-[facility] 7/21/22     ASSESSMENT       Diagnosis Orders   1. Right groin pain  Liborio Meza MD, Pain Medicine, [facility] [place]      2. S/P right inguinal herniorrhaphy  [address] [facility], Saundra Anthony MD, Pain Medicine, [address] returned despite trigger point injection. Options of formal nerve block discussed with pt. Follow up: Return for Referral to pain clinic for possible formal block will be scheduled. Tello Davila is seen today for post-op follow-up. He is S/P right inguinal hernia repair 2/17/22. He is tolerating a regular diet, having regular bowel movements. Symptoms and activity have gradually improved compared to preoperative. The surgical site is clean and has no drainage. About a dozen times over the past month, he gets a very short duration sting that occurs when going from sitting to a standing position. It is located in the medial thigh. Trigger poin injection performed last office visit had provided 100% relief, but pain has since returned. Past Medical History  Past Medical History:   Diagnosis Date    BPH with elevated PSA 07/17/2015    Follows with urology for this and elevated PSA    Dermatitis     follows with Dr. Kerwin trevino    Erectile dysfunction     Hypertension     Marisol    Obesity (BMI 30-39. 9)     Prediabetes 07/29/2016     Past Surgical History  Past Surgical History: Procedure Laterality Date    CARDIOVASCULAR STRESS TEST  2022    COLONOSCOPY  2007    Dr. Enriqueta Sanchez N/A 02/17/2022    RIGHT INGUINAL HERNIA REPAIR WITH MESH performed by Lnai Davis DO at [address]  2005    Dr Rohit Bailey ablation of prostate    PROSTATECTOMY N/A 07/01/2021    Robotic-assisted laparoscopic suprapubic prostatectomy @ [facility]    TRANSTHORACIC ECHOCARDIOGRAM  2022     Medications  Current Outpatient Medications   Medication Sig Dispense Refill    losartan (COZAAR) 100 MG tablet TAKE 1 TABLET DAILY 90 tablet 3    oxyCODONE-acetaminophen (PERCOCET) 5-325 MG per tablet Take 1 tablet by mouth every 4 hours as needed for Pain. tiZANidine (ZANAFLEX) 4 MG tablet Take 1 tablet by mouth 3 times daily as needed (back pain/spasm) 45 tablet 0    amLODIPine (NORVASC) 10 MG tablet Take 1 tablet by mouth once daily 90 tablet 3    aspirin 81 MG chewable tablet Take 1 tablet by mouth daily 30 tablet 3    Calcium Carbonate (CALCIUM 600 PO) Take 1 tablet by mouth in the morning and at bedtime      vitamin D (CHOLECALCIFEROL) 25 MCG (1000 UT) TABS tablet Take 1,000 Units by mouth daily      ascorbic acid (VITAMIN C) 500 MG tablet Take 500 mg by mouth daily      zinc 50 MG CAPS Take by mouth      lidocaine (XYLOCAINE) 5 % ointment Apply topically as needed. 50 g 1    nystatin (MYCOSTATIN) 587174 UNIT/GM cream Apply topically as needed Apply topically 2 times daily. As needed      VIAGRA 100 MG TABS        No current facility-administered medications for this visit. Allergies  has No Known Allergies. Social History   reports that he has never smoked. He has never used smokeless tobacco. He reports that he does not drink alcohol and does not use drugs.   Health Screening Exams  Health Maintenance   Topic Date Due    COVID-19 Vaccine (4 - Booster for Pfizer series) 02/08/2022    Annual Wellness Visit (AWV)  05/27/2022    Flu vaccine (1) 09/01/2022    Prostate Specific Antigen (PSA) Screening or Monitoring  04/02/2023    A1C test (Diabetic or Prediabetic)  04/23/2023    Lipids  04/23/2023    Depression Screen  06/16/2023    Colorectal Cancer Screen  06/17/2023    DTaP/Tdap/Td vaccine (2 - Td or Tdap) 07/17/2025    Shingles vaccine  Completed    Pneumococcal 65+ years Vaccine  Completed    Hepatitis C screen  Completed    Hepatitis A vaccine  Aged Out    Hepatitis B vaccine  Aged Out    Hib vaccine  Aged Out    Meningococcal (ACWY) vaccine  Aged Out     Review of Systems  History obtained from the patient. Constitutional: Denies any fever, chills, fatigue. Wound: Denies any rash, skin color changes or wound problems. Resp: Denies any cough, shortness of breath. CV: Denies any chest pain, orthopnea or syncope. GI: Denies any nausea, vomiting, blood in the stool, constipation or diarrhea. : Denies any hematuria, hesitancy or dysuria. OBJECTIVE    VITALS:  height is 5' 10\" (1.778 m) and weight is 207 lb (93.9 kg). His temperature is 97.3 °F (36.3 °C). His blood pressure is 138/74 and his pulse is 66. His oxygen saturation is 96%. CONSTITUTIONAL: Alert and oriented times 3, no acute distress and cooperative to examination. SKIN: Skin color, texture, turgor normal. No rashes or lesions. INCISION: wound margins intact and healing well. No signs of infection. No drainage. ABDOMEN: Soft, nondistended, no masses or organomegaly. Bowel sounds normal. right groin scar(s) present. No sign of recurrence, hematoma or seroma. Pin in the distribution of ileo inguinal nerve. Thank you for the interesting evaluation. Further recommendations as listed above.        Electronically signed by Katherine Mcdonald DO on 8/2/2022 at 12:25 PM ambulatory

## 2022-08-04 ENCOUNTER — TELEPHONE (OUTPATIENT)
Dept: FAMILY MEDICINE CLINIC | Age: 67
End: 2022-08-04

## 2022-08-04 DIAGNOSIS — R73.9 HYPERGLYCEMIA: Primary | ICD-10-CM

## 2022-08-04 NOTE — TELEPHONE ENCOUNTER
Left detailed message. Okay per HIPAA.  Requested call back if any further questions      Lab slip mailed to pt

## 2022-08-04 NOTE — TELEPHONE ENCOUNTER
Pt due for fasting labs prior to next apt on 8/24/2022. Please call to have pt complete this. Thanks! ASSESSMENT & PLAN   Diagnosis Orders   1.  Hyperglycemia  Hemoglobin A1C    Glucose, Random        Future Appointments   Date Time Provider Ju Urbina   8/24/2022  9:00 AM Sal Khoury, [address] [place] [address]

## 2022-08-07 ENCOUNTER — APPOINTMENT (OUTPATIENT)
Dept: CT IMAGING | Age: 67
End: 2022-08-07
Payer: MEDICARE

## 2022-08-07 ENCOUNTER — HOSPITAL ENCOUNTER (EMERGENCY)
Age: 67
Discharge: HOME | End: 2022-08-07
Attending: EMERGENCY MEDICINE
Payer: MEDICARE

## 2022-08-07 VITALS
WEIGHT: 207 LBS | OXYGEN SATURATION: 97 % | RESPIRATION RATE: 16 BRPM | HEART RATE: 79 BPM | SYSTOLIC BLOOD PRESSURE: 132 MMHG | HEIGHT: 70 IN | DIASTOLIC BLOOD PRESSURE: 76 MMHG | TEMPERATURE: 98.3 F | BODY MASS INDEX: 29.63 KG/M2

## 2022-08-07 DIAGNOSIS — R10.31 RIGHT INGUINAL PAIN: Primary | ICD-10-CM

## 2022-08-07 DIAGNOSIS — K59.03 DRUG-INDUCED CONSTIPATION: ICD-10-CM

## 2022-08-07 LAB
ANION GAP SERPL CALCULATED.3IONS-SCNC: 12 MEQ/L (ref 8–16)
BASOPHILS # BLD: 0.4 %
BASOPHILS ABSOLUTE: 0 THOU/MM3 (ref 0–0.1)
BILIRUBIN URINE: NEGATIVE
BLOOD, URINE: NEGATIVE
BUN BLDV-MCNC: 17 MG/DL (ref 7–22)
CALCIUM SERPL-MCNC: 8.8 MG/DL (ref 8.5–10.5)
CHARACTER, URINE: CLEAR
CHLORIDE BLD-SCNC: 101 MEQ/L (ref 98–111)
CO2: 26 MEQ/L (ref 23–33)
COLOR: YELLOW
CREAT SERPL-MCNC: 0.7 MG/DL (ref 0.4–1.2)
EOSINOPHIL # BLD: 1.5 %
EOSINOPHILS ABSOLUTE: 0.1 THOU/MM3 (ref 0–0.4)
ERYTHROCYTE [DISTWIDTH] IN BLOOD BY AUTOMATED COUNT: 13.1 % (ref 11.5–14.5)
ERYTHROCYTE [DISTWIDTH] IN BLOOD BY AUTOMATED COUNT: 43.5 FL (ref 35–45)
GFR SERPL CREATININE-BSD FRML MDRD: > 90 ML/MIN/1.73M2
GLUCOSE BLD-MCNC: 165 MG/DL (ref 70–108)
GLUCOSE URINE: NEGATIVE MG/DL
HCT VFR BLD CALC: 42.9 % (ref 42–52)
HEMOGLOBIN: 14.8 GM/DL (ref 14–18)
IMMATURE GRANS (ABS): 0.02 THOU/MM3 (ref 0–0.07)
IMMATURE GRANULOCYTES: 0.3 %
KETONES, URINE: NEGATIVE
LEUKOCYTE ESTERASE, URINE: NEGATIVE
LYMPHOCYTES # BLD: 25.5 %
LYMPHOCYTES ABSOLUTE: 1.7 THOU/MM3 (ref 1–4.8)
MCH RBC QN AUTO: 31.9 PG (ref 26–33)
MCHC RBC AUTO-ENTMCNC: 34.5 GM/DL (ref 32.2–35.5)
MCV RBC AUTO: 92.5 FL (ref 80–94)
MONOCYTES # BLD: 10.7 %
MONOCYTES ABSOLUTE: 0.7 THOU/MM3 (ref 0.4–1.3)
NITRITE, URINE: NEGATIVE
NUCLEATED RED BLOOD CELLS: 0 /100 WBC
OSMOLALITY CALCULATION: 282.8 MOSMOL/KG (ref 275–300)
PH UA: 6 (ref 5–9)
PLATELET # BLD: 232 THOU/MM3 (ref 130–400)
PMV BLD AUTO: 10.2 FL (ref 9.4–12.4)
POTASSIUM REFLEX MAGNESIUM: 3.8 MEQ/L (ref 3.5–5.2)
PROTEIN UA: NEGATIVE
RBC # BLD: 4.64 MILL/MM3 (ref 4.7–6.1)
SEG NEUTROPHILS: 61.6 %
SEGMENTED NEUTROPHILS ABSOLUTE COUNT: 4.1 THOU/MM3 (ref 1.8–7.7)
SODIUM BLD-SCNC: 139 MEQ/L (ref 135–145)
SPECIFIC GRAVITY, URINE: 1.02 (ref 1–1.03)
UROBILINOGEN, URINE: 0.2 EU/DL (ref 0–1)
WBC # BLD: 6.7 THOU/MM3 (ref 4.8–10.8)

## 2022-08-07 PROCEDURE — 6370000000 HC RX 637 (ALT 250 FOR IP): Performed by: STUDENT IN AN ORGANIZED HEALTH CARE EDUCATION/TRAINING PROGRAM

## 2022-08-07 PROCEDURE — 6360000004 HC RX CONTRAST MEDICATION: Performed by: EMERGENCY MEDICINE

## 2022-08-07 PROCEDURE — 74177 CT ABD & PELVIS W/CONTRAST: CPT

## 2022-08-07 PROCEDURE — 36415 COLL VENOUS BLD VENIPUNCTURE: CPT

## 2022-08-07 PROCEDURE — 81003 URINALYSIS AUTO W/O SCOPE: CPT

## 2022-08-07 PROCEDURE — 99285 EMERGENCY DEPT VISIT HI MDM: CPT

## 2022-08-07 PROCEDURE — 85025 COMPLETE CBC W/AUTO DIFF WBC: CPT

## 2022-08-07 PROCEDURE — 80048 BASIC METABOLIC PNL TOTAL CA: CPT

## 2022-08-07 RX ORDER — HYDROCODONE BITARTRATE AND ACETAMINOPHEN 5; 325 MG/1; MG/1
1 TABLET ORAL ONCE
Status: DISCONTINUED | OUTPATIENT
Start: 2022-08-07 | End: 2022-08-07

## 2022-08-07 RX ORDER — IBUPROFEN 200 MG
600 TABLET ORAL EVERY 6 HOURS PRN
COMMUNITY

## 2022-08-07 RX ORDER — POLYETHYLENE GLYCOL 3350 17 G/17G
17 POWDER, FOR SOLUTION ORAL DAILY
Qty: 3 EACH | Refills: 0 | Status: SHIPPED | OUTPATIENT
Start: 2022-08-07 | End: 2022-08-10

## 2022-08-07 RX ORDER — OXYCODONE HYDROCHLORIDE AND ACETAMINOPHEN 5; 325 MG/1; MG/1
1 TABLET ORAL ONCE
Status: COMPLETED | OUTPATIENT
Start: 2022-08-07 | End: 2022-08-07

## 2022-08-07 RX ADMIN — IOPAMIDOL 80 ML: 755 INJECTION, SOLUTION INTRAVENOUS at 17:04

## 2022-08-07 RX ADMIN — OXYCODONE AND ACETAMINOPHEN 1 TABLET: 5; 325 TABLET ORAL at 15:36

## 2022-08-07 ASSESSMENT — PAIN DESCRIPTION - LOCATION
LOCATION: GROIN
LOCATION: GROIN

## 2022-08-07 ASSESSMENT — ENCOUNTER SYMPTOMS
BACK PAIN: 0
DIARRHEA: 0
SHORTNESS OF BREATH: 0
COUGH: 0
SINUS PAIN: 0
EYE REDNESS: 0
RHINORRHEA: 0
SORE THROAT: 0
NAUSEA: 0
ABDOMINAL PAIN: 0
VOMITING: 0

## 2022-08-07 ASSESSMENT — PAIN - FUNCTIONAL ASSESSMENT: PAIN_FUNCTIONAL_ASSESSMENT: 0-10

## 2022-08-07 ASSESSMENT — PAIN SCALES - GENERAL
PAINLEVEL_OUTOF10: 6
PAINLEVEL_OUTOF10: 5

## 2022-08-07 NOTE — ED NOTES
Pt to ER with complaints of groin pain. He states he had surgery with Dr. Rocio Schwarz in February and that the pain he is experiencing is the same as before the surgery. Pt followed up with Dr. Rocio Schwarz last week and he sent pt to pain management. Pt states they aren't able to get him in for two weeks. He states when he resting his pain is 6/10 put when he is walking and moving his pain goes up to a 10/10.      John Faria RN  08/07/22 [id number]

## 2022-08-07 NOTE — ED PROVIDER NOTES
[address] [address]          Pt Name: Arnoldo Espinal  MRN: [id number]  [place] 1955  Date of evaluation: 8/7/2022  Treating Resident Physician: Medhat Jennings MD  Supervising Physician: Dr Sarah Peterson       Chief Complaint   Patient presents with    Groin Pain     History obtained from the patient. HISTORY OF PRESENT ILLNESS    HPI  Arnoldo Espinal is a 74 y.o. male with past medical history of hypertension, BPH, obesity who presents to the emergency department for evaluation of right inguinal pain has been waxing waning last few days. Patient was seen in office with a general surgeon on 8/2/2022 for right groin pain status post right inguinal hernia repair on 2/17/2022. At this time his pain was very minimal however he was going to schedule outpatient with a pain specialist for possible formal nerve block unsure of exactly what nerve they were blocking within this vicinity. However they were unable to get an appointment at this time. His pain worsened over the last few days and he came in for the evaluation. Denies any penile discharge, hematuria, dysuria, abdominal pain, nausea, vomiting, diarrhea or decreased appetite. The patient has no other acute complaints at this time. REVIEW OF SYSTEMS   Review of Systems   Constitutional:  Negative for chills and fever. HENT:  Negative for rhinorrhea, sinus pain and sore throat. Eyes:  Negative for redness. Respiratory:  Negative for cough and shortness of breath. Cardiovascular:  Negative for chest pain. Gastrointestinal:  Negative for abdominal pain, diarrhea, nausea and vomiting. Genitourinary:  Positive for testicular pain. Negative for dysuria, flank pain and hematuria. Right inguinal pain     Musculoskeletal:  Negative for back pain. Skin:  Negative for rash. Neurological:  Negative for light-headedness and headaches. Psychiatric/Behavioral:  Negative for agitation. PAST MEDICAL AND SURGICAL HISTORY     Past Medical History:   Diagnosis Date    BPH with elevated PSA 07/17/2015    Follows with urology for this and elevated PSA    Dermatitis     follows with derm, Dr. Galindo Luciano    Erectile dysfunction     Hypertension     Marisol    Obesity (BMI 30-39. 9)     Prediabetes 07/29/2016     Past Surgical History:   Procedure Laterality Date    CARDIOVASCULAR STRESS TEST  2022    COLONOSCOPY  2007    Dr. Emani Ross N/A 02/17/2022    RIGHT INGUINAL HERNIA REPAIR WITH MESH performed by Racheal Kat DO at [address]  2005    Dr Chong Record ablation of prostate    PROSTATECTOMY N/A 07/01/2021    Robotic-assisted laparoscopic suprapubic prostatectomy @ [facility]    TRANSTHORACIC ECHOCARDIOGRAM  2022         MEDICATIONS   No current facility-administered medications for this encounter.     Current Outpatient Medications:     ibuprofen (ADVIL;MOTRIN) 200 MG tablet, Take 600 mg by mouth every 6 hours as needed for Pain, Disp: , Rfl:     losartan (COZAAR) 100 MG tablet, TAKE 1 TABLET DAILY, Disp: 90 tablet, Rfl: 3    oxyCODONE-acetaminophen (PERCOCET) 5-325 MG per tablet, Take 1 tablet by mouth every 4 hours as needed for Pain., Disp: , Rfl:     tiZANidine (ZANAFLEX) 4 MG tablet, Take 1 tablet by mouth 3 times daily as needed (back pain/spasm), Disp: 45 tablet, Rfl: 0    amLODIPine (NORVASC) 10 MG tablet, Take 1 tablet by mouth once daily, Disp: 90 tablet, Rfl: 3    aspirin 81 MG chewable tablet, Take 1 tablet by mouth daily, Disp: 30 tablet, Rfl: 3    Calcium Carbonate (CALCIUM 600 PO), Take 1 tablet by mouth in the morning and at bedtime, Disp: , Rfl:     vitamin D (CHOLECALCIFEROL) 25 MCG (1000 UT) TABS tablet, Take 1,000 Units by mouth daily, Disp: , Rfl:     ascorbic acid (VITAMIN C) 500 MG tablet, Take 500 mg by mouth daily, Disp: , Rfl:     zinc 50 MG CAPS, Take by mouth, Disp: , Rfl:     lidocaine respiratory distress. Breath sounds: Normal breath sounds. Abdominal:      General: Abdomen is flat. There is no distension. Palpations: Abdomen is soft. Tenderness: There is no abdominal tenderness. There is no guarding or rebound. Genitourinary:     Penis: Normal and uncircumcised. Testes: Cremasteric reflex is present. Right: Mass, tenderness or testicular hydrocele not present. Right testis is descended. Cremasteric reflex is present. Left: Mass, tenderness or testicular hydrocele not present. Left testis is descended. Cremasteric reflex is present. Epididymis:      Right: Normal.      Left: Normal.      Comments: Right inguinal crease tenderness palpation. ,  Right inguinal hernia scar well-appearing no surrounding erythema fluctuance or drainage. Musculoskeletal:         General: Normal range of motion. Cervical back: Normal range of motion and neck supple. No rigidity. No muscular tenderness. Lymphadenopathy:      Cervical: No cervical adenopathy. Skin:     General: Skin is warm and dry. Capillary Refill: Capillary refill takes less than 2 seconds. Coloration: Skin is not jaundiced. Neurological:      General: No focal deficit present. Mental Status: He is alert and oriented to person, place, and time. Psychiatric:         Mood and Affect: Mood normal.         Behavior: Behavior normal.         MEDICAL DECISION MAKING      Differential Diagnosis Considered but not limited to:   Testicular torsion, post hernia repair complication, inguinal pain, chronic inguinal pain, chronic hernia pain, musculoskeletal pain incarcerated hernia, strangulated hernia    Work up performed: CBC, BMP, UA, CT abdomen pelvis with IV contrast    Assessment:   Patient is pending CT abdomen pelvis with IV contrast for evaluation of his hernia for further complaints. He is medically stable this time and his care will be signed out to Dr. Helene Lay.         ED RESULTS Laboratory results:  Labs Reviewed   CBC WITH AUTO DIFFERENTIAL - Abnormal; Notable for the following components:       Result Value    RBC 4.64 (*)     All other components within normal limits   BASIC METABOLIC PANEL W/ REFLEX TO MG FOR LOW K - Abnormal; Notable for the following components:    Glucose 165 (*)     All other components within normal limits   URINALYSIS WITH REFLEX TO CULTURE   ANION GAP   GLOMERULAR FILTRATION RATE, ESTIMATED   OSMOLALITY       Radiologic studies results:  CT ABDOMEN PELVIS W IV CONTRAST Additional Contrast? None   Final Result   1. Questionable cystitis. Mild postop edema/hematoma/scarring left inguinal region. 2. Findings of constipation. Cholelithiasis. **This report has been created using voice recognition software. It may contain minor errors which are inherent in voice recognition technology. **      Final report electronically signed by Dr. Ludivina Holt on 8/7/2022 5:38 PM          ED Medications administered this visit:   Medications   oxyCODONE-acetaminophen (PERCOCET) 5-325 MG per tablet 1 tablet (1 tablet Oral Given 8/7/22 1536)   iopamidol (ISOVUE-370) 76 % injection 80 mL (80 mLs IntraVENous Given 8/7/22 1704)         ED COURSE     ED Course as of 08/25/22 1943   Sun Aug 07, 2022   1512 Urinalysis with Reflex to Culture:    Glucose, UA NEGATIVE   Bilirubin, Urine NEGATIVE   Ketones, Urine NEGATIVE   Specific Gravity, Urine 1.019   Blood, Urine NEGATIVE   pH, UA 6.0   Protein, UA NEGATIVE   Urobilinogen, Urine 0.2   Nitrite, Urine NEGATIVE   Leukocyte Esterase, Urine NEGATIVE   Color, UA YELLOW   Character, Urine CLEAR  Signs of infection or hematuria. [AL]      ED Course User Index  [AL] Luzmaria Rocha MD         MEDICATION CHANGES     Discharge Medication List as of 8/7/2022  5:48 PM        START taking these medications    Details   polyethylene glycol (MIRALAX) 17 g packet Take 17 g by mouth in the morning for 3 days. , Disp-3 each, R-0Normal FINAL DISPOSITION     Final diagnoses:   Right inguinal pain   Drug-induced constipation     Condition: condition: stable  Dispo: Care transferred to Dr. Viviana Quesada. This transcription was electronically signed. Parts of this transcriptions may have been dictated by use of voice recognition software and electronically transcribed, and parts may have been transcribed with the assistance of an ED scribe. The transcription may contain errors not detected in proofreading. Please refer to my supervising physician's documentation if my documentation differs.     Electronically Signed: Darlene Ramirez MD, 08/25/22, 7:43 PM         Darlene Ramirez MD  Resident  08/07/22 MD Michael  Resident  08/25/22 [id number]

## 2022-08-07 NOTE — Clinical Note
Aixa Trujillo was seen and treated in our emergency department on 8/7/2022. He may return to work on 08/10/2022. If you have any questions or concerns, please don't hesitate to call.       Kady Ser, DO

## 2022-08-07 NOTE — Clinical Note
Ike Morrow was seen and treated in our emergency department on 8/7/2022. He may return to work on 08/10/2022. If you have any questions or concerns, please don't hesitate to call.       Reynaldo Restrepo, DO

## 2022-08-08 DIAGNOSIS — R10.31 CHRONIC PAIN OF RIGHT INGUINAL REGION: Primary | ICD-10-CM

## 2022-08-08 DIAGNOSIS — G89.29 CHRONIC PAIN OF RIGHT INGUINAL REGION: Primary | ICD-10-CM

## 2022-08-09 ENCOUNTER — HOSPITAL ENCOUNTER (OUTPATIENT)
Dept: INTERVENTIONAL RADIOLOGY/VASCULAR | Age: 67
Discharge: HOME | End: 2022-08-09
Payer: MEDICARE

## 2022-08-09 DIAGNOSIS — R10.31 CHRONIC PAIN OF RIGHT INGUINAL REGION: ICD-10-CM

## 2022-08-09 DIAGNOSIS — G89.29 CHRONIC PAIN OF RIGHT INGUINAL REGION: ICD-10-CM

## 2022-08-09 PROCEDURE — 76942 ECHO GUIDE FOR BIOPSY: CPT

## 2022-08-09 PROCEDURE — 64425 NJX AA&/STRD II IH NERVES: CPT

## 2022-08-09 PROCEDURE — 2709999900 IR NERVE BLOCK PROCEDURE

## 2022-08-09 PROCEDURE — 2500000003 HC RX 250 WO HCPCS: Performed by: RADIOLOGY

## 2022-08-09 PROCEDURE — 6360000002 HC RX W HCPCS: Performed by: RADIOLOGY

## 2022-08-09 RX ORDER — METHYLPREDNISOLONE ACETATE 80 MG/ML
INJECTION, SUSPENSION INTRA-ARTICULAR; INTRALESIONAL; INTRAMUSCULAR; SOFT TISSUE
Status: COMPLETED | OUTPATIENT
Start: 2022-08-09 | End: 2022-08-09

## 2022-08-09 RX ADMIN — METHYLPREDNISOLONE ACETATE 80 MG: 80 INJECTION, SUSPENSION INTRA-ARTICULAR; INTRALESIONAL; INTRAMUSCULAR; SOFT TISSUE at 11:26

## 2022-08-09 RX ADMIN — Medication 5 ML: at 11:25

## 2022-08-09 NOTE — OP NOTE
Department of Radiology  Post Procedure Progress Note      Pre-Procedure Diagnosis:  right inguinal pain     Procedure Performed:  ilioinguinal nerve blk    Anesthesia: local     Findings: successful    Immediate Complications:  None    Estimated Blood Loss: minimal    SEE DICTATED PROCEDURE NOTE FOR COMPLETE DETAILS.     Kirit Wolfe MD   8/9/2022 11:31 AM

## 2022-08-09 NOTE — H&P
Formulation and discussion of sedation / procedure plans, risks, benefits, side effects and alternatives with patient and/or responsible adult completed.     Electronically signed by Omar Ag MD on 8/9/22 at 11:31 AM EDT

## 2022-08-09 NOTE — PROGRESS NOTES
1115 Pt arrived in IR for right ileoinguinal nerve block. Procedure reviewed with pt and pt agreed to site of procedure and procedure to be performed today. 1119 Pt positioned supine on table,   1123 Right groin prepped for procedure with betadine  1125 Procedure started with Dr. Sophie Hoyos  8776 Procedure complete. Pt tolerated well. Bandaid to right groin puncture site. 1130 Pt released in satisfactory condition to self, gait steady. Skin natural for race, warm, dry.  Respirations even and unlabored at rest. No complaints voiced at this time

## 2022-08-17 DIAGNOSIS — G89.29 CHRONIC PAIN OF RIGHT INGUINAL REGION: Primary | ICD-10-CM

## 2022-08-17 DIAGNOSIS — R10.31 CHRONIC PAIN OF RIGHT INGUINAL REGION: Primary | ICD-10-CM

## 2022-08-22 RX ORDER — BUPIVACAINE HYDROCHLORIDE 2.5 MG/ML
5 INJECTION, SOLUTION EPIDURAL; INFILTRATION; INTRACAUDAL ONCE
Status: CANCELLED | OUTPATIENT
Start: 2022-08-22 | End: 2022-08-22

## 2022-08-22 RX ORDER — METHYLPREDNISOLONE ACETATE 80 MG/ML
80 INJECTION, SUSPENSION INTRA-ARTICULAR; INTRALESIONAL; INTRAMUSCULAR; SOFT TISSUE ONCE
Status: CANCELLED | OUTPATIENT
Start: 2022-08-22 | End: 2022-08-22

## 2022-08-23 ENCOUNTER — HOSPITAL ENCOUNTER (OUTPATIENT)
Dept: INTERVENTIONAL RADIOLOGY/VASCULAR | Age: 67
Discharge: HOME | End: 2022-08-23
Payer: MEDICARE

## 2022-08-23 DIAGNOSIS — R10.31 CHRONIC PAIN OF RIGHT INGUINAL REGION: ICD-10-CM

## 2022-08-23 DIAGNOSIS — G89.29 CHRONIC PAIN OF RIGHT INGUINAL REGION: ICD-10-CM

## 2022-08-23 PROCEDURE — 76942 ECHO GUIDE FOR BIOPSY: CPT

## 2022-08-23 PROCEDURE — 2500000003 HC RX 250 WO HCPCS: Performed by: RADIOLOGY

## 2022-08-23 PROCEDURE — 2709999900 IR NERVE BLOCK PROCEDURE

## 2022-08-23 PROCEDURE — 6360000002 HC RX W HCPCS: Performed by: RADIOLOGY

## 2022-08-23 PROCEDURE — 64425 NJX AA&/STRD II IH NERVES: CPT

## 2022-08-23 RX ORDER — METHYLPREDNISOLONE ACETATE 40 MG/ML
INJECTION, SUSPENSION INTRA-ARTICULAR; INTRALESIONAL; INTRAMUSCULAR; SOFT TISSUE
Status: COMPLETED | OUTPATIENT
Start: 2022-08-23 | End: 2022-08-23

## 2022-08-23 RX ADMIN — Medication 5 ML: at 16:00

## 2022-08-23 RX ADMIN — METHYLPREDNISOLONE ACETATE 80 MG: 40 INJECTION, SUSPENSION INTRA-ARTICULAR; INTRALESIONAL; INTRAMUSCULAR; SOFT TISSUE at 16:00

## 2022-08-23 NOTE — OP NOTE
Department of Radiology  Post Procedure Progress Note    Pre-Procedure Diagnosis:  post hernia repair pain      Procedure Performed:  ilioinguinal nerve block right side       Anesthesia: local     Findings: successful    Immediate Complications:  None    Estimated Blood Loss: minimal    SEE DICTATED PROCEDURE NOTE FOR COMPLETE DETAILS.       Omar Ag MD   8/23/2022 4:15 PM

## 2022-08-23 NOTE — PROGRESS NOTES
1546 Pt arrived in IR for right iliinguinal nerve block. Procedure reviewed with pt and pt agreed to site of procedure and procedure to be performed today. Pt c/o right groin pain, 8/1-0  1559 Procedure started with Dr. Thien Gregory  1601 Procedure complete. Pt tolerated well. Bandaid to left groin puncture site. 1611 Pt released in satisfactory condition to self. Ambulatory. Skin natural for race, warm, dry.  Respirations even and unlabored at rest. No complaints voiced at this time

## 2022-08-23 NOTE — H&P
Formulation and discussion of sedation / procedure plans, risks, benefits, side effects and alternatives with patient and/or responsible adult completed.     Electronically signed by Bandar Goss MD on 8/23/22 at 4:15 PM EDT

## 2022-08-24 ENCOUNTER — TELEPHONE (OUTPATIENT)
Dept: SURGERY | Age: 67
End: 2022-08-24

## 2022-08-24 DIAGNOSIS — Z98.890 S/P RIGHT INGUINAL HERNIORRHAPHY: ICD-10-CM

## 2022-08-24 DIAGNOSIS — G89.29 CHRONIC PAIN OF RIGHT INGUINAL REGION: Primary | ICD-10-CM

## 2022-08-24 DIAGNOSIS — Z87.19 S/P RIGHT INGUINAL HERNIORRHAPHY: ICD-10-CM

## 2022-08-24 DIAGNOSIS — R10.31 CHRONIC PAIN OF RIGHT INGUINAL REGION: Primary | ICD-10-CM

## 2022-08-24 NOTE — TELEPHONE ENCOUNTER
Pt is s/p right inguinal hernia repair 2/17/22 with continued right groin pain. He has had ilioinguinal nerve blocks x2 with Dr. Allen Villafuerte, most recent yesterday in which Dr. Allen Villafuerte mentioned to patient an oral steroid may also help with his pain. Pt is wondering if you think this could be worth a try and if so can you prescribe. He will be scheduled for a third nerve block with Dr. Allen Villafuerte in the near future.     Referral to Dr. Marlen Hurtado was placed on 8/5 however Dr. Marlen Hurtado says he wouldn't do anything different than Dr. Allen Villafuerte

## 2022-08-26 ENCOUNTER — HOSPITAL ENCOUNTER (OUTPATIENT)
Dept: CT IMAGING | Age: 67
Discharge: HOME | End: 2022-08-26
Payer: MEDICARE

## 2022-08-26 ENCOUNTER — HOSPITAL ENCOUNTER (OUTPATIENT)
Dept: INTERVENTIONAL RADIOLOGY/VASCULAR | Age: 67
Discharge: HOME | End: 2022-08-26
Payer: MEDICARE

## 2022-08-26 DIAGNOSIS — R10.31 CHRONIC PAIN OF RIGHT INGUINAL REGION: ICD-10-CM

## 2022-08-26 DIAGNOSIS — Z87.19 S/P RIGHT INGUINAL HERNIORRHAPHY: ICD-10-CM

## 2022-08-26 DIAGNOSIS — G89.29 CHRONIC PAIN OF RIGHT INGUINAL REGION: ICD-10-CM

## 2022-08-26 DIAGNOSIS — Z98.890 S/P RIGHT INGUINAL HERNIORRHAPHY: ICD-10-CM

## 2022-08-26 PROCEDURE — 2709999900 IR FLUORO GUIDED NEEDLE PLACEMENT

## 2022-08-26 PROCEDURE — 77012 CT SCAN FOR NEEDLE BIOPSY: CPT

## 2022-08-26 PROCEDURE — 6360000002 HC RX W HCPCS: Performed by: RADIOLOGY

## 2022-08-26 PROCEDURE — 2500000003 HC RX 250 WO HCPCS: Performed by: RADIOLOGY

## 2022-08-26 PROCEDURE — 64425 NJX AA&/STRD II IH NERVES: CPT

## 2022-08-26 RX ORDER — METHYLPREDNISOLONE ACETATE 80 MG/ML
80 INJECTION, SUSPENSION INTRA-ARTICULAR; INTRALESIONAL; INTRAMUSCULAR; SOFT TISSUE ONCE
Status: COMPLETED | OUTPATIENT
Start: 2022-08-26 | End: 2022-08-26

## 2022-08-26 RX ORDER — BUPIVACAINE HYDROCHLORIDE 2.5 MG/ML
5 INJECTION, SOLUTION EPIDURAL; INFILTRATION; INTRACAUDAL ONCE
Status: COMPLETED | OUTPATIENT
Start: 2022-08-26 | End: 2022-08-26

## 2022-08-26 RX ADMIN — BUPIVACAINE HYDROCHLORIDE 9 ML: 2.5 INJECTION, SOLUTION EPIDURAL; INFILTRATION; INTRACAUDAL; PERINEURAL at 15:32

## 2022-08-26 RX ADMIN — METHYLPREDNISOLONE ACETATE 80 MG: 80 INJECTION, SUSPENSION INTRA-ARTICULAR; INTRALESIONAL; INTRAMUSCULAR; SOFT TISSUE at 15:32

## 2022-08-26 NOTE — PROGRESS NOTES
[phone] Patient received in CT for Right groin nerve block. 1510 This procedure has been fully reviewed with the patient and written informed consent has been obtained. 1521 Procedure started with Dr. Roseline Garrido. 1533 Procedure completed; patient tolerated well. Band aid to right groin; no bleeding noted. [phone] Patient taken to lobby via ambulation.

## 2022-09-10 ENCOUNTER — NURSE ONLY (OUTPATIENT)
Dept: LAB | Age: 67
End: 2022-09-10

## 2022-09-10 DIAGNOSIS — R73.9 HYPERGLYCEMIA: ICD-10-CM

## 2022-09-10 LAB
AVERAGE GLUCOSE: 132 MG/DL (ref 70–126)
GLUCOSE BLD-MCNC: 109 MG/DL (ref 70–108)
HBA1C MFR BLD: 6.4 % (ref 4.4–6.4)

## 2022-09-10 SDOH — HEALTH STABILITY: PHYSICAL HEALTH: ON AVERAGE, HOW MANY DAYS PER WEEK DO YOU ENGAGE IN MODERATE TO STRENUOUS EXERCISE (LIKE A BRISK WALK)?: 2 DAYS

## 2022-09-10 SDOH — HEALTH STABILITY: PHYSICAL HEALTH: ON AVERAGE, HOW MANY MINUTES DO YOU ENGAGE IN EXERCISE AT THIS LEVEL?: 30 MIN

## 2022-09-10 ASSESSMENT — LIFESTYLE VARIABLES
HOW MANY STANDARD DRINKS CONTAINING ALCOHOL DO YOU HAVE ON A TYPICAL DAY: 0
HOW MANY STANDARD DRINKS CONTAINING ALCOHOL DO YOU HAVE ON A TYPICAL DAY: PATIENT DOES NOT DRINK
HOW OFTEN DO YOU HAVE A DRINK CONTAINING ALCOHOL: NEVER
HOW OFTEN DO YOU HAVE SIX OR MORE DRINKS ON ONE OCCASION: 1
HOW OFTEN DO YOU HAVE A DRINK CONTAINING ALCOHOL: 1

## 2022-09-10 ASSESSMENT — PATIENT HEALTH QUESTIONNAIRE - PHQ9
SUM OF ALL RESPONSES TO PHQ QUESTIONS 1-9: 0
1. LITTLE INTEREST OR PLEASURE IN DOING THINGS: 0
SUM OF ALL RESPONSES TO PHQ QUESTIONS 1-9: 0
2. FEELING DOWN, DEPRESSED OR HOPELESS: 0
SUM OF ALL RESPONSES TO PHQ9 QUESTIONS 1 & 2: 0
SUM OF ALL RESPONSES TO PHQ QUESTIONS 1-9: 0
SUM OF ALL RESPONSES TO PHQ QUESTIONS 1-9: 0

## 2022-09-12 ENCOUNTER — TELEPHONE (OUTPATIENT)
Dept: FAMILY MEDICINE CLINIC | Age: 67
End: 2022-09-12

## 2022-09-12 PROBLEM — R07.9 CHEST PAIN: Status: RESOLVED | Noted: 2022-04-22 | Resolved: 2022-09-12

## 2022-09-12 PROBLEM — Z87.898 HISTORY OF CHEST PAIN: Status: RESOLVED | Noted: 2022-05-10 | Resolved: 2022-09-12

## 2022-09-12 NOTE — TELEPHONE ENCOUNTER
----- Message from Tiffanie Lynn, DO sent at 9/11/2022  9:00 AM EDT -----  Please let pt know that blood sugar remains in the prediabetic range  No better, no worse  Will discuss at f/u apt on Tuesday. Let me know if questions, thanks!
yes

## 2022-09-12 NOTE — PROGRESS NOTES
Chief Complaint   Patient presents with    Medicare AWV    Follow-up     F/U for chronic conditions     History obtained from the patient. SUBJECTIVE:  Olaf Steve is a 69 y.o. male that presents today for     -HTN:    HPI:    Taking meds as prescribed ?: yes  Tolerating well ?: yes  Side Effects ?: denies  BP at home ?: <140/90  Working on TLCS ?: yes  Chest Pain/SOB/Palpitations? denies    BP Readings from Last 3 Encounters:   09/13/22 102/62   08/07/22 132/76   08/02/22 138/74       -PreDM/Obesity: working on wt loss. wts up some though d/t some hip issues he was dealing with that are now better. Wt Readings from Last 3 Encounters:   09/13/22 218 lb 12.8 oz (99.2 kg)   08/07/22 207 lb (93.9 kg)   08/02/22 207 lb (93.9 kg)     Lab Results   Component Value Date/Time    LABA1C 6.4 09/10/2022 08:16 AM    LABA1C 6.4 04/23/2022 06:03 AM    LABA1C 6.3 11/20/2021 08:40 AM    LABA1C 6.2 05/26/2021 08:26 AM    LABA1C 6.0 05/21/2020 08:51 AM    LABA1C 6.4 02/21/2020 08:37 AM         -Vit D def: hx of vit d def, repeat labs WNL previsously. Doing well      -BPH/Elevated PSA:  Follows with urology at [facility]  S/p robotic assisted prostatectomy July 2021  Doing well overall  Still having some nocturia on and off  Overall doing better and happy with results.        Age/Gender Health Maintenance    Lipid -   Lab Results   Component Value Date    CHOL 166 04/23/2022    CHOL 166 05/26/2021    CHOL 174 08/17/2020     Lab Results   Component Value Date    TRIG 126 04/23/2022    TRIG 105 05/26/2021    TRIG 124 08/17/2020     Lab Results   Component Value Date    HDL 33 04/23/2022    HDL 37 05/26/2021    HDL 36 08/17/2020     Lab Results   Component Value Date    LDLCALC 108 04/23/2022    LDLCALC 108 05/26/2021    LDLCALC 113 08/17/2020       DM Screen -   Lab Results   Component Value Date/Time    GLUCOSE 109 09/10/2022 08:16 AM    GLUCOSE 214 06/21/2021 12:23 PM     Lab Results   Component Value Date/Time    LABA1C 6.4 09/10/2022 08:16 AM    LABA1C 6.4 04/23/2022 06:03 AM    LABA1C 6.3 11/20/2021 08:40 AM    LABA1C 6.2 05/26/2021 08:26 AM    LABA1C 6.0 05/21/2020 08:51 AM    LABA1C 6.4 02/21/2020 08:37 AM       TSH - 4.090 (DEC 2017)    Lab Results   Component Value Date    TSH 2.600 05/26/2021       Colon Cancer Screening - NEG Cologuard June 2020  Lung Cancer Screening - never smoker    Tetanus - UTD July 2015  Influenza Vaccine - Candidate FALL 2022  Pneumonia Vaccine - UTD PCV 13 in AUG 2019/PPV 23 in AUG 2020  Zoster - UTD shingrix x 2    PSA testing discussion - Follows with urology at [facility]    Lab Results   Component Value Date    PSA 1.38 (H) 04/02/2022    PSA 6.53 (H) 10/07/2020    PSA 5.84 (H) 08/02/2019     AAA Screening - never smoker       Current Outpatient Medications   Medication Sig Dispense Refill    ibuprofen (ADVIL;MOTRIN) 200 MG tablet Take 600 mg by mouth every 6 hours as needed for Pain      losartan (COZAAR) 100 MG tablet TAKE 1 TABLET DAILY 90 tablet 3    tiZANidine (ZANAFLEX) 4 MG tablet Take 1 tablet by mouth 3 times daily as needed (back pain/spasm) 45 tablet 0    amLODIPine (NORVASC) 10 MG tablet Take 1 tablet by mouth once daily 90 tablet 3    aspirin 81 MG chewable tablet Take 1 tablet by mouth daily 30 tablet 3    Calcium Carbonate (CALCIUM 600 PO) Take 1 tablet by mouth in the morning and at bedtime      vitamin D (CHOLECALCIFEROL) 25 MCG (1000 UT) TABS tablet Take 1,000 Units by mouth daily      ascorbic acid (VITAMIN C) 500 MG tablet Take 500 mg by mouth daily      zinc 50 MG CAPS Take by mouth      lidocaine (XYLOCAINE) 5 % ointment Apply topically as needed. 50 g 1    nystatin (MYCOSTATIN) 694263 UNIT/GM cream Apply topically as needed Apply topically 2 times daily. As needed      VIAGRA 100 MG TABS        No current facility-administered medications for this visit. No orders of the defined types were placed in this encounter.         All medications reviewed and reconciled, including OTC and herbal medications. Updated list given to patient. Patient Active Problem List   Diagnosis    Dermatitis    Hypertension    Erectile dysfunction    BPH with elevated PSA    Prediabetes    Obesity (BMI 30-39. 9)       Past Medical History:   Diagnosis Date    BPH with elevated PSA 07/17/2015    Follows with urology for this and elevated PSA    Dermatitis     follows with Dr. Juliet trevino    Erectile dysfunction     Hypertension     Marisol    Obesity (BMI 30-39. 9)     Prediabetes 07/29/2016         Past Surgical History:   Procedure Laterality Date    CARDIOVASCULAR STRESS TEST  2022    COLONOSCOPY  2007    Dr. Marline Hameed N/A 02/17/2022    RIGHT INGUINAL HERNIA REPAIR WITH MESH performed by Luis Wesley DO at [address]  2005    Dr Joe Preciado ablation of prostate    PROSTATECTOMY N/A 07/01/2021    Robotic-assisted laparoscopic [facility]rapubic prostate[facility] @ [facility]    TRANSTHORACIC ECHOCARDIOGRAM  2022         No Known Allergies      Social History     Tobacco Use    Smoking status: Never    Smokeless tobacco: Never   Substance Use Topics    Alcohol use: No       Family History   Problem Relation Age of Onset    High Blood Pressure Mother     Cancer Father         Lung Cancer    Colon Cancer Neg Hx     Prostate Cancer Neg Hx          I have reviewed the patient's past medical history, past surgical history, allergies, medications, social and family history and I have made updates where appropriate.       Review of Systems  Positive responses are highlighted in bold    Constitutional:  Fever, Chills, Night Sweats, Fatigue, Unexpected changes in weight  HENT:  Ear pain, Tinnitus, Nosebleeds, Trouble swallowing, Hearing loss, Sore throat  Cardiovascular:  Chest Pain, Palpitations, Orthopnea, Paroxysmal Nocturnal Dyspnea  Respiratory:  Cough, Wheezing, Shortness of breath, Chest tightness, Apnea  Gastrointestinal:  Nausea, Vomiting, Diarrhea, Constipation, Heartburn, Blood in stool  Genitourinary:  Difficulty or painful urination, Flank pain, Change in frequency, Urgency  Skin:  Color change, Rash, Itching, Wound  Musculoskeletal:  Joint pain, Back pain, Gait problems, Joint swelling, Myalgias  Neurological:  Dizziness, Headaches, Presyncope, Numbness, Seizures, Tremors  Endocrine:  Heat Intolerance, Cold Intolerance, Polydipsia, Polyphagia, Polyuria      PHYSICAL EXAM:  Vitals:    09/13/22 0946   BP: 102/62   Pulse: 80   Resp: 12   Temp: 97.9 °F (36.6 °C)   TempSrc: Oral   SpO2: 96%   Weight: 218 lb 12.8 oz (99.2 kg)   Height: 5' 10\" (1.778 m)     Body mass index is 31.39 kg/m². VS Reviewed  General Appearance: A&O x 3, No acute distress,well developed and well- nourished  Eyes: pupils equal, round, and reactive to light, extraocular eye movements intact, conjunctivae and eye lids without erythema  ENT: external ear and ear canal clear bilaterally, TMs intact and regular, nose without deformity, nasal mucosa and turbinates normal without polyps, oropharynx normal, dentition is normal for age  Neck: supple and non-tender without mass, no thyromegaly or thyroid nodules, no cervical lymphadenopathy  Pulmonary/Chest: clear to auscultation bilaterally- no wheezes, rales or rhonchi, normal air movement, no respiratory distress or retractions  Cardiovascular: S1 and S2 auscultated w/ RRR. No murmurs, rubs, clicks, or gallops, distal pulses intact. Abdomen: soft, non-tender, non-distended, bowel sounds physiologic,  no rebound or guarding, no masses or hernias noted. Liver and spleen without enlargement. Extremities: no cyanosis, clubbing or edema of the lower extremities. Skin: warm and dry, no rash or erythema      ASSESSMENT & PLAN  1. Essential hypertension    Stable  At goal  con't norvasc and cozaar  Labs UTD    2. Prediabetes    Con't TLC's  Labs in 6 months    3. Obesity (BMI 30-39. 9)    Con't TLC's    4. Vitamin D deficiency    Stable  Con't supplementation    5.  BPH with elevated PSA    Stable  Monitor sxs  F/u uro      DISPOSITION    Return in about 6 months (around 3/13/2023) for follow-up on chronic medical conditions, sooner as needed. Rodney Matias released without restrictions. No future appointments. PATIENT COUNSELING    Barriers to learning and self management: none    Discussed use, benefit, and side effects of prescribed medications. Barriers to medication compliance addressed. All patient questions answered. Pt voiced understanding. Electronically signed by Concepción Hurley DO on 9/13/2022 at 10:01 AM      Medicare Annual Wellness Visit    Leigh Stevenson is here for Medicare AWV and Follow-up (F/U for chronic conditions)    Assessment & Plan   Medicare annual wellness visit, subsequent  A    Recommendations for Preventive Services Due: see orders and patient instructions/AVS.  Recommended screening schedule for the next 5-10 years is provided to the patient in written form: see Patient Instructions/AVS.     Return in about 6 months (around 3/13/2023) for follow-up on chronic medical conditions, sooner as needed. Subjective       Patient's complete Health Risk Assessment and screening values have been reviewed and are found in Flowsheets. The following problems were reviewed today and where indicated follow up appointments were made and/or referrals ordered.     Positive Risk Factor Screenings with Interventions:             General Health and ACP:  General  In general, how would you say your health is?: Good  In the past 7 days, have you experienced any of the following: New or Increased Pain, New or Increased Fatigue, Loneliness, Social Isolation, Stress or Anger?: No  Do you get the social and emotional support that you need?: Yes  Do you have a Living Will?: Yes    Advance Directives       Power of  Living Will ACP-Advance Directive ACP-Power of     Not on File Not on File Not on File Not on 3 [facility] Interventions:  No Living Will: ACP documents already completed- patient asked to provide copy to the office    Health Habits/Nutrition:  Physical Activity: Insufficiently Active    Days of Exercise per Week: 2 days    Minutes of Exercise per Session: 30 min     Have you lost any weight without trying in the past 3 months?: No  Body mass index: (!) 31.39  Have you seen the dentist within the past year?: Yes  Health Habits/Nutrition Interventions:  Nutritional issues:  educational materials for healthy, well-balanced diet provided             Objective   Vitals:    09/13/22 0946   BP: 102/62   Pulse: 80   Resp: 12   Temp: 97.9 °F (36.6 °C)   TempSrc: Oral   SpO2: 96%   Weight: 218 lb 12.8 oz (99.2 kg)   Height: 5' 10\" (1.778 m)      Body mass index is 31.39 kg/m². No Known Allergies  Prior to Visit Medications    Medication Sig Taking? Authorizing Provider   ibuprofen (ADVIL;MOTRIN) 200 MG tablet Take 600 mg by mouth every 6 hours as needed for Pain Yes Historical Provider, MD   losartan (COZAAR) 100 MG tablet TAKE 1 TABLET DAILY Yes HAROLDO Rios CNP   tiZANidine (ZANAFLEX) 4 MG tablet Take 1 tablet by mouth 3 times daily as needed (back pain/spasm) Yes Patricio Almeida, DO   amLODIPine (NORVASC) 10 MG tablet Take 1 tablet by mouth once daily Yes Navin Baumann, DO   aspirin 81 MG chewable tablet Take 1 tablet by mouth daily Yes HAROLDO Weiner CNP   Calcium Carbonate (CALCIUM 600 PO) Take 1 tablet by mouth in the morning and at bedtime Yes Historical Provider, MD   vitamin D (CHOLECALCIFEROL) 25 MCG (1000 UT) TABS tablet Take 1,000 Units by mouth daily Yes Historical Provider, MD   ascorbic acid (VITAMIN C) 500 MG tablet Take 500 mg by mouth daily Yes Historical Provider, MD   zinc 50 MG CAPS Take by mouth Yes Historical Provider, MD   lidocaine (XYLOCAINE) 5 % ointment Apply topically as needed.  Yes HAROLDO Baker CNP   nystatin (MYCOSTATIN) [id number] UNIT/GM cream Apply topically as needed Apply topically 2 times daily. As needed Yes Historical Provider, MD   VIAGRA 100 MG TABS  Yes Historical Provider, MD Humphrey (Including outside providers/suppliers regularly involved in providing care):   Patient Care Team:  Yvette Cunningham DO as PCP - General (Family Medicine)  Yvette Cunningham DO as PCP - [facility] Kim Mei Provider  Yovany Trevizo MD as Cardiologist (Cardiology)     Reviewed and updated this visit:  Tobacco  Allergies  Meds  Problems  Med Hx  Surg Hx  Soc Hx  Fam Hx                 Care plan reviewed with and given to patient.        Electronically signed by Yvette Cunningham DO on 9/13/2022 at 10:01 AM

## 2022-09-13 ENCOUNTER — OFFICE VISIT (OUTPATIENT)
Dept: FAMILY MEDICINE CLINIC | Age: 67
End: 2022-09-13
Payer: MEDICARE

## 2022-09-13 VITALS
RESPIRATION RATE: 12 BRPM | HEART RATE: 80 BPM | OXYGEN SATURATION: 96 % | BODY MASS INDEX: 31.32 KG/M2 | TEMPERATURE: 97.9 F | WEIGHT: 218.8 LBS | HEIGHT: 70 IN | DIASTOLIC BLOOD PRESSURE: 62 MMHG | SYSTOLIC BLOOD PRESSURE: 102 MMHG

## 2022-09-13 DIAGNOSIS — R73.03 PREDIABETES: ICD-10-CM

## 2022-09-13 DIAGNOSIS — Z00.00 MEDICARE ANNUAL WELLNESS VISIT, SUBSEQUENT: Primary | ICD-10-CM

## 2022-09-13 DIAGNOSIS — E66.9 OBESITY (BMI 30-39.9): ICD-10-CM

## 2022-09-13 DIAGNOSIS — E55.9 VITAMIN D DEFICIENCY: ICD-10-CM

## 2022-09-13 DIAGNOSIS — N40.0 BPH WITH ELEVATED PSA: ICD-10-CM

## 2022-09-13 DIAGNOSIS — I10 ESSENTIAL HYPERTENSION: ICD-10-CM

## 2022-09-13 DIAGNOSIS — R97.20 BPH WITH ELEVATED PSA: ICD-10-CM

## 2022-09-13 PROCEDURE — 3017F COLORECTAL CA SCREEN DOC REV: CPT | Performed by: FAMILY MEDICINE

## 2022-09-13 PROCEDURE — G0439 PPPS, SUBSEQ VISIT: HCPCS | Performed by: FAMILY MEDICINE

## 2022-09-13 PROCEDURE — 1123F ACP DISCUSS/DSCN MKR DOCD: CPT | Performed by: FAMILY MEDICINE

## 2022-09-13 ASSESSMENT — LIFESTYLE VARIABLES
HOW MANY STANDARD DRINKS CONTAINING ALCOHOL DO YOU HAVE ON A TYPICAL DAY: PATIENT DOES NOT DRINK
HOW OFTEN DO YOU HAVE A DRINK CONTAINING ALCOHOL: NEVER

## 2022-09-13 NOTE — PATIENT INSTRUCTIONS
Personalized Preventive Plan for Campos Oropeza - 9/13/2022  Medicare offers a range of preventive health benefits. Some of the tests and screenings are paid in full while other may be subject to a deductible, co-insurance, and/or copay. Some of these benefits include a comprehensive review of your medical history including lifestyle, illnesses that may run in your family, and various assessments and screenings as appropriate. After reviewing your medical record and screening and assessments performed today your provider may have ordered immunizations, labs, imaging, and/or referrals for you. A list of these orders (if applicable) as well as your Preventive Care list are included within your After Visit Summary for your review. Other Preventive Recommendations:    A preventive eye exam performed by an eye specialist is recommended every 1-2 years to screen for glaucoma; cataracts, macular degeneration, and other eye disorders. A preventive dental visit is recommended every 6 months. Try to get at least 150 minutes of exercise per week or 10,000 steps per day on a pedometer . Order or download the FREE \"Exercise & Physical Activity: Your Everyday Guide\" from [employer]. Call 2-822.253.5534 or search [employer] online. You need 9229-9314 mg of calcium and 4809-9021 IU of vitamin D per day. It is possible to meet your calcium requirement with diet alone, but a vitamin D supplement is usually necessary to meet this goal.  When exposed to the sun, use a sunscreen that protects against both UVA and UVB radiation with an SPF of 30 or greater. Reapply every 2 to 3 hours or after sweating, drying off with a towel, or swimming. Always wear a seat belt when traveling in a car. Always wear a helmet when riding a bicycle or motorcycle.

## 2022-10-17 ENCOUNTER — TELEPHONE (OUTPATIENT)
Dept: FAMILY MEDICINE CLINIC | Age: 67
End: 2022-10-17

## 2022-10-17 ENCOUNTER — NURSE ONLY (OUTPATIENT)
Dept: FAMILY MEDICINE CLINIC | Age: 67
End: 2022-10-17
Payer: MEDICARE

## 2022-10-17 DIAGNOSIS — Z23 FLU VACCINE NEED: Primary | ICD-10-CM

## 2022-10-17 PROCEDURE — 90694 VACC AIIV4 NO PRSRV 0.5ML IM: CPT | Performed by: FAMILY MEDICINE

## 2022-10-17 PROCEDURE — G0008 ADMIN INFLUENZA VIRUS VAC: HCPCS | Performed by: FAMILY MEDICINE

## 2022-10-17 NOTE — PROGRESS NOTES
Immunization(s) given during visit:    Immunizations Administered       Name Date Dose Route    Influenza, FLUAD, (age 72 y+), Adjuvanted, 0.5mL 10/17/2022 0.5 mL Intramuscular    Site: Deltoid- Left    Lot: [id number]    NDC: 78335-995-33            Most recent Vaccine Information Sheet dated 8/6/21 given to pt  Vaccine Information Sheet, \"Influenza - Inactivated\"  given to Thao Curiel, or parent/legal guardian of  Thao Curiel and verbalized understanding. Patient responses:    Have you ever had a reaction to a flu vaccine? No  Do you have an allergy to eggs, neomycin or polymixin? No  Do you have an allergy to Thimerosal, contact lens solution, or Merthiolate? No  Have you ever had Guillian Beaver Syndrome? No  Do you have any current illness? No  Do you have a temperature above 100 degrees? No  Do you have an active neurological disorder? No      Flu vaccine given per order. Please see immunization tab.

## 2022-10-17 NOTE — TELEPHONE ENCOUNTER
----- Message from Jacey 143 sent at 10/17/2022  9:11 AM EDT -----  Subject: Appointment Request    Reason for Call: Established Patient Appointment needed: Flu Shot    QUESTIONS    Reason for appointment request? No appointments available during search     Additional Information for Provider? [facility] can't book flu shot.  Please call   patient back to schedule his flu shot  ---------------------------------------------------------------------------  --------------  Simone Mitchell INFO  [phone]; OK to leave message on voicemail  ---------------------------------------------------------------------------  --------------  SCRIPT ANSWERS  COVID Screen: Randy Harrell

## 2022-10-17 NOTE — TELEPHONE ENCOUNTER
Future Appointments   Date Time Provider Ju Urbina   10/17/2022  1:20 PM SCHEDULE, NURSE Jennifer Olvera [facility] [place]   3/14/2023  9:40 AM Patricio DELGADO [address] [place] [address]

## 2023-01-16 ENCOUNTER — OFFICE VISIT (OUTPATIENT)
Dept: FAMILY MEDICINE CLINIC | Age: 68
End: 2023-01-16
Payer: MEDICARE

## 2023-01-16 VITALS
WEIGHT: 227 LBS | SYSTOLIC BLOOD PRESSURE: 138 MMHG | HEART RATE: 100 BPM | OXYGEN SATURATION: 98 % | RESPIRATION RATE: 18 BRPM | DIASTOLIC BLOOD PRESSURE: 80 MMHG | HEIGHT: 70 IN | TEMPERATURE: 97.4 F | BODY MASS INDEX: 32.5 KG/M2

## 2023-01-16 DIAGNOSIS — H81.11 BENIGN PAROXYSMAL POSITIONAL VERTIGO OF RIGHT EAR: Primary | ICD-10-CM

## 2023-01-16 DIAGNOSIS — M54.50 ACUTE LEFT-SIDED LOW BACK PAIN WITHOUT SCIATICA: ICD-10-CM

## 2023-01-16 PROCEDURE — 3017F COLORECTAL CA SCREEN DOC REV: CPT | Performed by: FAMILY MEDICINE

## 2023-01-16 PROCEDURE — G8484 FLU IMMUNIZE NO ADMIN: HCPCS | Performed by: FAMILY MEDICINE

## 2023-01-16 PROCEDURE — 3075F SYST BP GE 130 - 139MM HG: CPT | Performed by: FAMILY MEDICINE

## 2023-01-16 PROCEDURE — 3079F DIAST BP 80-89 MM HG: CPT | Performed by: FAMILY MEDICINE

## 2023-01-16 PROCEDURE — 1036F TOBACCO NON-USER: CPT | Performed by: FAMILY MEDICINE

## 2023-01-16 PROCEDURE — 99213 OFFICE O/P EST LOW 20 MIN: CPT | Performed by: FAMILY MEDICINE

## 2023-01-16 PROCEDURE — G8427 DOCREV CUR MEDS BY ELIG CLIN: HCPCS | Performed by: FAMILY MEDICINE

## 2023-01-16 PROCEDURE — 1123F ACP DISCUSS/DSCN MKR DOCD: CPT | Performed by: FAMILY MEDICINE

## 2023-01-16 PROCEDURE — G8417 CALC BMI ABV UP PARAM F/U: HCPCS | Performed by: FAMILY MEDICINE

## 2023-01-16 RX ORDER — TIZANIDINE 4 MG/1
4 TABLET ORAL EVERY 8 HOURS PRN
Qty: 30 TABLET | Refills: 0 | Status: SHIPPED | OUTPATIENT
Start: 2023-01-16

## 2023-01-16 SDOH — ECONOMIC STABILITY: FOOD INSECURITY: WITHIN THE PAST 12 MONTHS, YOU WORRIED THAT YOUR FOOD WOULD RUN OUT BEFORE YOU GOT MONEY TO BUY MORE.: NEVER TRUE

## 2023-01-16 SDOH — ECONOMIC STABILITY: FOOD INSECURITY: WITHIN THE PAST 12 MONTHS, THE FOOD YOU BOUGHT JUST DIDN'T LAST AND YOU DIDN'T HAVE MONEY TO GET MORE.: NEVER TRUE

## 2023-01-16 ASSESSMENT — PATIENT HEALTH QUESTIONNAIRE - PHQ9
1. LITTLE INTEREST OR PLEASURE IN DOING THINGS: 0
SUM OF ALL RESPONSES TO PHQ QUESTIONS 1-9: 0
SUM OF ALL RESPONSES TO PHQ9 QUESTIONS 1 & 2: 0
2. FEELING DOWN, DEPRESSED OR HOPELESS: 0
SUM OF ALL RESPONSES TO PHQ QUESTIONS 1-9: 0

## 2023-01-16 ASSESSMENT — SOCIAL DETERMINANTS OF HEALTH (SDOH): HOW HARD IS IT FOR YOU TO PAY FOR THE VERY BASICS LIKE FOOD, HOUSING, MEDICAL CARE, AND HEATING?: NOT HARD AT ALL

## 2023-01-16 NOTE — PROGRESS NOTES
Chief Complaint   Patient presents with    Dizziness    Back Pain       History obtained from the patient. SUBJECTIVE:  Sofie Hill is a 69 y.o. male that presents today for     -Vertigo:  Started Friday  Comes and goes  When moves, lays on R ear or roll over, will feel like moving  Has had before  No tinnitus or hearing loss  No HA's  No trauma.       -Low Back Pain:    HPI:   Low   Stared Thursday  On L Side  No injury  Does not radiate  No radicular sxs  Better with rest  Worse with up, walking and bending  Pain is 3/10    Inciting injury or history of trauma? No  Pain is relieved by - rest  Pain is aggravated by -  as above  Radiation of the pain? No  Paresthesias of the extremities?  no  Saddle anesthesia? No  Bowel or bladder incontinence?  No  Treatments tried - tylenol      Age/Gender Health Maintenance    Lipid -   Lab Results   Component Value Date    CHOL 166 04/23/2022    CHOL 166 05/26/2021    CHOL 174 08/17/2020     Lab Results   Component Value Date    TRIG 126 04/23/2022    TRIG 105 05/26/2021    TRIG 124 08/17/2020     Lab Results   Component Value Date    HDL 33 04/23/2022    HDL 37 05/26/2021    HDL 36 08/17/2020     Lab Results   Component Value Date    LDLCALC 108 04/23/2022    LDLCALC 108 05/26/2021    LDLCALC 113 08/17/2020       DM Screen -   Lab Results   Component Value Date/Time    GLUCOSE 109 09/10/2022 08:16 AM    GLUCOSE 214 06/21/2021 12:23 PM     Lab Results   Component Value Date/Time    LABA1C 6.4 09/10/2022 08:16 AM    LABA1C 6.4 04/23/2022 06:03 AM    LABA1C 6.3 11/20/2021 08:40 AM    LABA1C 6.2 05/26/2021 08:26 AM    LABA1C 6.0 05/21/2020 08:51 AM    LABA1C 6.4 02/21/2020 08:37 AM       TSH - 4.090 (DEC 2017)    Lab Results   Component Value Date    TSH 2.600 05/26/2021       Colon Cancer Screening - NEG Cologuard June 2020  Lung Cancer Screening - never smoker    Tetanus - UTD July 2015  Influenza Vaccine - UTD FALL 2022  Pneumonia Vaccine - UTD PCV 13 in AUG 2019/PPV 23 in AUG 2020  Zoster - UTD shingrix x 2    PSA testing discussion - Follows with urology at [facility]    Lab Results   Component Value Date    PSA 1.38 (H) 04/02/2022    PSA 6.53 (H) 10/07/2020    PSA 5.84 (H) 08/02/2019     AAA Screening - never smoker       Current Outpatient Medications   Medication Sig Dispense Refill    tiZANidine (ZANAFLEX) 4 MG tablet Take 1 tablet by mouth every 8 hours as needed (Back pain/spasm) 30 tablet 0    ibuprofen (ADVIL;MOTRIN) 200 MG tablet Take 600 mg by mouth every 6 hours as needed for Pain      losartan (COZAAR) 100 MG tablet TAKE 1 TABLET DAILY 90 tablet 3    amLODIPine (NORVASC) 10 MG tablet Take 1 tablet by mouth once daily 90 tablet 3    aspirin 81 MG chewable tablet Take 1 tablet by mouth daily 30 tablet 3    Calcium Carbonate (CALCIUM 600 PO) Take 1 tablet by mouth in the morning and at bedtime      vitamin D (CHOLECALCIFEROL) 25 MCG (1000 UT) TABS tablet Take 1,000 Units by mouth daily      ascorbic acid (VITAMIN C) 500 MG tablet Take 500 mg by mouth daily      zinc 50 MG CAPS Take by mouth      lidocaine (XYLOCAINE) 5 % ointment Apply topically as needed. 50 g 1    nystatin (MYCOSTATIN) 575531 UNIT/GM cream Apply topically as needed Apply topically 2 times daily. As needed      VIAGRA 100 MG TABS        No current facility-administered medications for this visit. Orders Placed This Encounter   Medications    tiZANidine (ZANAFLEX) 4 MG tablet     Sig: Take 1 tablet by mouth every 8 hours as needed (Back pain/spasm)     Dispense:  30 tablet     Refill:  0         All medications reviewed and reconciled, including OTC and herbal medications. Updated list given to patient. Patient Active Problem List   Diagnosis    Dermatitis    Hypertension    Erectile dysfunction    BPH with elevated PSA    Prediabetes    Obesity (BMI 30-39. 9)       Past Medical History:   Diagnosis Date    BPH with elevated PSA 07/17/2015    Follows with urology for this and elevated PSA Dermatitis     follows with Dr. Maggi trevino    Erectile dysfunction     Hypertension     Marisol    Obesity (BMI 30-39. 9)     Prediabetes 07/29/2016         Past Surgical History:   Procedure Laterality Date    CARDIOVASCULAR STRESS TEST  2022    COLONOSCOPY  2007    Dr. Alesia Schwab N/A 02/17/2022    RIGHT INGUINAL HERNIA REPAIR WITH MESH performed by Ottoniel Mercado DO at [address] [facility]  2005    Dr Leanna Hopkins ablation of prostate    PROSTATECTOMY N/A 07/01/2021    Robotic-assisted laparoscopic suprapubic prostatectomy @ [facility]    TRANSTHORACIC ECHOCARDIOGRAM  2022       No Known Allergies      Social History     Tobacco Use    Smoking status: Never    Smokeless tobacco: Never   Substance Use Topics    Alcohol use: No       Family History   Problem Relation Age of Onset    High Blood Pressure Mother     Cancer Father         Lung Cancer    Colon Cancer Neg Hx     Prostate Cancer Neg Hx          I have reviewed the patient's past medical history, past surgical history, allergies, medications, social and family history and I have made updates where appropriate.       Review of Systems  Positive responses are highlighted in bold    Constitutional:  Fever, Chills, Night Sweats, Fatigue, Unexpected changes in weight  HENT:  Ear pain, Tinnitus, Nosebleeds, Trouble swallowing, Hearing loss, Sore throat  Cardiovascular:  Chest Pain, Palpitations, Orthopnea, Paroxysmal Nocturnal Dyspnea  Respiratory:  Cough, Wheezing, Shortness of breath, Chest tightness, Apnea  Gastrointestinal:  Nausea, Vomiting, Diarrhea, Constipation, Heartburn, Blood in stool  Genitourinary:  Difficulty or painful urination, Flank pain, Change in frequency, Urgency  Skin:  Color change, Rash, Itching, Wound  Musculoskeletal:  Joint pain, Back pain, Gait problems, Joint swelling, Myalgias  Neurological:  Dizziness, Headaches, Presyncope, Numbness, Seizures, Tremors  Endocrine:  Heat Intolerance, Cold Intolerance, Polydipsia, Polyphagia, Polyuria      PHYSICAL EXAM:  Vitals:    01/16/23 1037   BP: 138/80   Pulse: 100   Resp: 18   Temp: 97.4 °F (36.3 °C)   SpO2: 98%   Weight: 227 lb (103 kg)   Height: 5' 10\" (1.778 m)     Body mass index is 32.57 kg/m². VS Reviewed  General Appearance: A&O x 3, No acute distress,well developed and well- nourished  Eyes: pupils equal, round, and reactive to light, extraocular eye movements intact, conjunctivae and eye lids without erythema  ENT: external ear and ear canal clear bilaterally, TMs intact and regular, nose without deformity, nasal mucosa and turbinates normal without polyps, oropharynx normal, dentition is normal for age  Neck: supple and non-tender without mass, no thyromegaly or thyroid nodules, no cervical lymphadenopathy  Pulmonary/Chest: clear to auscultation bilaterally- no wheezes, rales or rhonchi, normal air movement, no respiratory distress or retractions  Cardiovascular: S1 and S2 auscultated w/ RRR. No murmurs, rubs, clicks, or gallops, distal pulses intact. Abdomen: soft, non-tender, non-distended, bowel sounds physiologic,  no rebound or guarding, no masses or hernias noted. Liver and spleen without enlargement. Extremities: no cyanosis, clubbing or edema of the lower extremities. Skin: warm and dry, no rash or erythema  Neuro Exam:   Cranial [id number] Intact.    Cranial nerve II: Normal Visual Acuity   Cranial nerve III: Pupils: equal, round, reactive to light   Cranial nerves III, IV, VI: Extraocular Movements: intact   Cranial nerve V: Facial sensation: intact   Cranial nerve VII:Facial strength: intact   Cranial nerve VIII: Hearing: intact   Cranial nerve IX: Palate Elevation intact bilaterally  Cranial nerve XI: Shoulder shrug intact bilaterally  Cranial nerve XII: Tongue movement: normal     Muscle Strength Testing    Deltoid Right 5/5  Left 5/5  Biceps Right 5/5  Left 5/5  Triceps Right 5/5  Left 5/5  Wrist Extensors Right  5/5  Left 5/5   Flexor Digitorum Profundus Right 5/5  Left 5/5  Hand Intrinsics Right 5/5  Left 5/5  Hip Flexors Right 5/5  Left 5/5  Quadriceps Right 5/5  Left 5/5  Anterior Tibialis Right 5/5  Left 5/5  Extensor Hallucis Longus Right 5/5  Left 5/5  Gastrocnemius/Soleus Right 5/5  Left 5/5     Sensory Testing    C5 Right 0=Normal; no sensory loss Left 0=Normal; no sensory loss  C6 Right 0=Normal; no sensory loss Left 0=Normal; no sensory loss  C7 Right 0=Normal; no sensory loss Left 0=Normal; no sensory loss  C8 Right 0=Normal; no sensory loss Left 0=Normal; no sensory loss  T1 Right 0=Normal; no sensory loss Left 0=Normal; no sensory loss    L2 Right 0=Normal; no sensory loss Left 0=Normal; no sensory loss  L3 Right 0=Normal; no sensory loss Left 0=Normal; no sensory loss  L4 Right 0=Normal; no sensory loss Left 0=Normal; no sensory loss  L5 Right 0=Normal; no sensory loss Left 0=Normal; no sensory loss  S1 Right 0=Normal; no sensory loss Left 0=Normal; no sensory loss     Cerebellar Testing    Finger to Nose:  Right  WNL Yes;  Left WNL  Yes  Heel to Krishna WNL: Right  WNL  Yes;  Left WNL  Yes     Deep Tendon Reflexes 2/4 equal and symmetric throughout   Clonus:  No  Decreased arm swing No   Shuffling gait No  Narrow base of support No  Able to stand without using arms  Yes  Bradykinesia  No  Tremor No  Increased tone at wrist especially with opening/closing opposite hand  No  Nisula hallpike: + to the R. Negative to the L  LUMBAR SPINE EXAM:  Examination of the Lumbar spine shows:  Deformity Absent . Soft Tissue Swelling Absent . Soft Tissue Tenderness Present. Midline Bone Tenderness Absent . Paraspinal Muscular Spasm Present. Previous Incisions Absent . Erythema Absent . Lumbar Flexion does not produce pain. Lumbar Extension does not produce pain. NEUROLOGICAL EXAM:  SLR     Left: Negative. Right Negative. DTR 2+ bilaterally  Parish's Sign Absent   Gait normal Heel/ Toe.   Sensation to Touch normal    LE strength    Right Left   Hip Flexors 5/5 5/5   Hip Extensors 5/5 5/5   Knee Flexors 5/5 5/5   Knee Extensors 5/5 5/5   Ankle Flexors 5/5 5/5   Ankle Extensors 5/5 5/5   Extensor Hallicus Longus 5/5 5/5   Dorsiflexors 5/5 5/5     Sensation:  T12 100% 100%   L1 100% 100%   L2 100% 100%   L3 100% 100%   L4 100% 100%   L5 100% 100%   S1 100% 100%   S2 100% 100%   S3-S5 100% 100%       ASSESSMENT & PLAN  1. Benign paroxysmal positional vertigo of right ear    Mild sxs  Reassuring exam  Trial HEP, given today  If no better in a few days, call, and will get setup with vestibular rehab    2. Acute left-sided low back pain without sciatica    HEP  Tylenol  Add Zanaflex  Heating pad  F/u if no better  Reviewed ER precautions, pt understands. - tiZANidine (ZANAFLEX) 4 MG tablet; Take 1 tablet by mouth every 8 hours as needed (Back pain/spasm)  Dispense: 30 tablet; Refill: 0      DISPOSITION    Return if symptoms worsen or fail to improve. Stephen Meng released without restrictions. Future Appointments   Date Time Provider Ju Urbina   3/14/2023  9:40 AM [id number]     PATIENT COUNSELING    Barriers to learning and self management: none    Discussed use, benefit, and side effects of prescribed medications. Barriers to medication compliance addressed. All patient questions answered. Pt voiced understanding.        Electronically signed by Candis Orellana DO on 1/16/2023 at 11:10 AM

## 2023-01-22 ENCOUNTER — PATIENT MESSAGE (OUTPATIENT)
Dept: FAMILY MEDICINE CLINIC | Age: 68
End: 2023-01-22

## 2023-01-23 ENCOUNTER — OFFICE VISIT (OUTPATIENT)
Dept: FAMILY MEDICINE CLINIC | Age: 68
End: 2023-01-23

## 2023-01-23 VITALS
HEIGHT: 70 IN | BODY MASS INDEX: 32.93 KG/M2 | DIASTOLIC BLOOD PRESSURE: 70 MMHG | SYSTOLIC BLOOD PRESSURE: 144 MMHG | HEART RATE: 80 BPM | TEMPERATURE: 98 F | WEIGHT: 230 LBS | RESPIRATION RATE: 16 BRPM | OXYGEN SATURATION: 98 %

## 2023-01-23 DIAGNOSIS — H81.11 BENIGN PAROXYSMAL POSITIONAL VERTIGO OF RIGHT EAR: Primary | ICD-10-CM

## 2023-01-23 DIAGNOSIS — M54.50 ACUTE LEFT-SIDED LOW BACK PAIN WITHOUT SCIATICA: ICD-10-CM

## 2023-01-23 DIAGNOSIS — G47.00 INSOMNIA, UNSPECIFIED TYPE: ICD-10-CM

## 2023-01-23 RX ORDER — KETOROLAC TROMETHAMINE 30 MG/ML
30 INJECTION, SOLUTION INTRAMUSCULAR; INTRAVENOUS ONCE
Status: COMPLETED | OUTPATIENT
Start: 2023-01-23 | End: 2023-01-23

## 2023-01-23 RX ORDER — PREDNISONE 20 MG/1
40 TABLET ORAL DAILY
Qty: 10 TABLET | Refills: 0 | Status: SHIPPED | OUTPATIENT
Start: 2023-01-24 | End: 2023-01-29

## 2023-01-23 RX ORDER — MELOXICAM 7.5 MG/1
7.5 TABLET ORAL DAILY
Qty: 30 TABLET | Refills: 0 | Status: SHIPPED | OUTPATIENT
Start: 2023-01-24 | End: 2023-02-23

## 2023-01-23 RX ORDER — METHYLPREDNISOLONE ACETATE 80 MG/ML
80 INJECTION, SUSPENSION INTRA-ARTICULAR; INTRALESIONAL; INTRAMUSCULAR; SOFT TISSUE ONCE
Status: COMPLETED | OUTPATIENT
Start: 2023-01-23 | End: 2023-01-23

## 2023-01-23 RX ORDER — DOXEPIN HYDROCHLORIDE 6 MG/1
1 TABLET ORAL NIGHTLY PRN
Qty: 30 TABLET | Refills: 2 | Status: SHIPPED | OUTPATIENT
Start: 2023-01-23 | End: 2023-01-25

## 2023-01-23 RX ADMIN — METHYLPREDNISOLONE ACETATE 80 MG: 80 INJECTION, SUSPENSION INTRA-ARTICULAR; INTRALESIONAL; INTRAMUSCULAR; SOFT TISSUE at 14:14

## 2023-01-23 RX ADMIN — KETOROLAC TROMETHAMINE 30 MG: 30 INJECTION, SOLUTION INTRAMUSCULAR; INTRAVENOUS at 14:12

## 2023-01-23 NOTE — TELEPHONE ENCOUNTER
Future Appointments   Date Time Provider Ju Urbina   1/23/2023  1:40 PM Sal [facility] DO [facility] [place]   3/14/2023  9:40 AM Sal Khoury DO [facility] [address] [facility]

## 2023-01-23 NOTE — PROGRESS NOTES
Chief Complaint   Patient presents with    Back Pain    Dizziness     Improving    Insomnia     Worse       History obtained from the patient. SUBJECTIVE:  Rosa Maria Hoang is a 69 y.o. male that presents today for     -Vertigo LAST VISIT:  Started Friday  Comes and goes  When moves, lays on R ear or roll over, will feel like moving  Has had before  No tinnitus or hearing loss  No HA's  No trauma. UPDATE TODAY:   Vertigo doing better  Still present but improving  Is doing HEP  No tinnitus or hearing loss  No HA's  No trauma.       -Low Back Pain LAST VISIT:    HPI:   Low   Stared Thursday  On L Side  No injury  Does not radiate  No radicular sxs  Better with rest  Worse with up, walking and bending  Pain is 3/10    Inciting injury or history of trauma? No  Pain is relieved by - rest  Pain is aggravated by -  as above  Radiation of the pain? No  Paresthesias of the extremities?  no  Saddle anesthesia? No  Bowel or bladder incontinence?  No  Treatments tried - tylenol    UPDATE TODAY:   Saw a wk ago for above  Given HEP and Zanaflex  Feels back is getting worse  L Low back  No radiation  No radicular sxs  No bowel/bladder issues  No fevers, chills or night sweats.        -Insomnia:  Chronic issue for him  Tried on trazodone OCT 2021  Did not help and had side effects  Doing various OTC meds, ineffective  Trouble falling and staying asleep  Denies depression/anxiety  Sleep hygiene ok      Age/Gender Health Maintenance    Lipid -   Lab Results   Component Value Date    CHOL 166 04/23/2022    CHOL 166 05/26/2021    CHOL 174 08/17/2020     Lab Results   Component Value Date    TRIG 126 04/23/2022    TRIG 105 05/26/2021    TRIG 124 08/17/2020     Lab Results   Component Value Date    HDL 33 04/23/2022    HDL 37 05/26/2021    HDL 36 08/17/2020     Lab Results   Component Value Date    LDLCALC 108 04/23/2022    LDLCALC 108 05/26/2021    LDLCALC 113 08/17/2020       DM Screen -   Lab Results   Component Value Date/Time    GLUCOSE 109 09/10/2022 08:16 AM    GLUCOSE 214 06/21/2021 12:23 PM     Lab Results   Component Value Date/Time    LABA1C 6.4 09/10/2022 08:16 AM    LABA1C 6.4 04/23/2022 06:03 AM    LABA1C 6.3 11/20/2021 08:40 AM    LABA1C 6.2 05/26/2021 08:26 AM    LABA1C 6.0 05/21/2020 08:51 AM    LABA1C 6.4 02/21/2020 08:37 AM       TSH - 4.090 (DEC 2017)    Lab Results   Component Value Date    TSH 2.600 05/26/2021       Colon Cancer Screening - NEG Cologuard June 2020  Lung Cancer Screening - never smoker    Tetanus - UTD July 2015  Influenza Vaccine - UTD FALL 2022  Pneumonia Vaccine - UTD PCV 13 in AUG 2019/PPV 23 in AUG 2020  Zoster - UTD shingrix x 2    PSA testing discussion - Follows with urology at [facility]    Lab Results   Component Value Date    PSA 1.38 (H) 04/02/2022    PSA 6.53 (H) 10/07/2020    PSA 5.84 (H) 08/02/2019     AAA Screening - never smoker       Current Outpatient Medications   Medication Sig Dispense Refill    [START ON 1/24/2023] predniSONE (DELTASONE) 20 MG tablet Take 2 tablets by mouth daily for 5 days 10 tablet 0    [START ON 1/24/2023] meloxicam (MOBIC) 7.5 MG tablet Take 1 tablet by mouth daily 30 tablet 0    Doxepin HCl 6 MG TABS Take 1 tablet by mouth nightly as needed (for sleep) 30 tablet 2    tiZANidine (ZANAFLEX) 4 MG tablet Take 1 tablet by mouth every 8 hours as needed (Back pain/spasm) 30 tablet 0    losartan (COZAAR) 100 MG tablet TAKE 1 TABLET DAILY 90 tablet 3    amLODIPine (NORVASC) 10 MG tablet Take 1 tablet by mouth once daily 90 tablet 3    aspirin 81 MG chewable tablet Take 1 tablet by mouth daily 30 tablet 3    Calcium Carbonate (CALCIUM 600 PO) Take 1 tablet by mouth in the morning and at bedtime      vitamin D (CHOLECALCIFEROL) 25 MCG (1000 UT) TABS tablet Take 1,000 Units by mouth daily      ascorbic acid (VITAMIN C) 500 MG tablet Take 500 mg by mouth daily      zinc 50 MG CAPS Take by mouth      nystatin (MYCOSTATIN) 800068 UNIT/GM cream Apply topically as needed Apply topically 2 times daily. As needed      VIAGRA 100 MG TABS        No current facility-administered medications for this visit. Orders Placed This Encounter   Medications    predniSONE (DELTASONE) 20 MG tablet     Sig: Take 2 tablets by mouth daily for 5 days     Dispense:  10 tablet     Refill:  0    methylPREDNISolone acetate (DEPO-MEDROL) injection 80 mg    ketorolac (TORADOL) injection 30 mg    meloxicam (MOBIC) 7.5 MG tablet     Sig: Take 1 tablet by mouth daily     Dispense:  30 tablet     Refill:  0    Doxepin HCl 6 MG TABS     Sig: Take 1 tablet by mouth nightly as needed (for sleep)     Dispense:  30 tablet     Refill:  2           All medications reviewed and reconciled, including OTC and herbal medications. Updated list given to patient. Patient Active Problem List   Diagnosis    Dermatitis    Hypertension    Erectile dysfunction    BPH with elevated PSA    Prediabetes    Obesity (BMI 30-39. 9)    Insomnia       Past Medical History:   Diagnosis Date    BPH with elevated PSA 07/17/2015    Follows with urology for this and elevated PSA    Dermatitis     follows with Dr. Juan trevino    Erectile dysfunction     Hypertension     Marisol    Obesity (BMI 30-39. 9)     Prediabetes 07/29/2016         Past Surgical History:   Procedure Laterality Date    CARDIOVASCULAR STRESS TEST  2022    COLONOSCOPY  2007    Dr. Lily Jacome N/A 02/17/2022    RIGHT INGUINAL HERNIA REPAIR WITH MESH performed by Christina Rizvi DO at [address]  2005    Dr Sridhar Arriola laser ablation of prostate    PROSTATECTOMY N/A 07/01/2021    Robotic-assisted laparoscopic suprapubic prostatectomy @ [facility]    TRANSTHORACIC ECHOCARDIOGRAM  2022       No Known Allergies      Social History     Tobacco Use    Smoking status: Never    Smokeless tobacco: Never   Substance Use Topics    Alcohol use: No       Family History   Problem Relation Age of Onset    High Blood Pressure Mother     Cancer Father Lung Cancer    Colon Cancer Neg Hx     Prostate Cancer Neg Hx          I have reviewed the patient's past medical history, past surgical history, allergies, medications, social and family history and I have made updates where appropriate. Review of Systems  Positive responses are highlighted in bold    Constitutional:  Fever, Chills, Night Sweats, Fatigue, Unexpected changes in weight  HENT:  Ear pain, Tinnitus, Nosebleeds, Trouble swallowing, Hearing loss, Sore throat  Cardiovascular:  Chest Pain, Palpitations, Orthopnea, Paroxysmal Nocturnal Dyspnea  Respiratory:  Cough, Wheezing, Shortness of breath, Chest tightness, Apnea  Gastrointestinal:  Nausea, Vomiting, Diarrhea, Constipation, Heartburn, Blood in stool  Genitourinary:  Difficulty or painful urination, Flank pain, Change in frequency, Urgency  Skin:  Color change, Rash, Itching, Wound  Musculoskeletal:  Joint pain, Back pain, Gait problems, Joint swelling, Myalgias  Neurological:  Dizziness, Headaches, Presyncope, Numbness, Seizures, Tremors  Endocrine:  Heat Intolerance, Cold Intolerance, Polydipsia, Polyphagia, Polyuria      PHYSICAL EXAM:  Vitals:    01/23/23 1335   BP: (!) 144/70   Pulse: 80   Resp: 16   Temp: 98 °F (36.7 °C)   SpO2: 98%   Weight: 230 lb (104.3 kg)   Height: 5' 10\" (1.778 m)     Body mass index is 33 kg/m².        VS Reviewed  General Appearance: A&O x 3, No acute distress,well developed and well- nourished  Eyes: pupils equal, round, and reactive to light, extraocular eye movements intact, conjunctivae and eye lids without erythema  ENT: external ear and ear canal clear bilaterally, TMs intact and regular, nose without deformity, nasal mucosa and turbinates normal without polyps, oropharynx normal, dentition is normal for age  Neck: supple and non-tender without mass, no thyromegaly or thyroid nodules, no cervical lymphadenopathy  Pulmonary/Chest: clear to auscultation bilaterally- no wheezes, rales or rhonchi, normal air movement, no respiratory distress or retractions  Cardiovascular: S1 and S2 auscultated w/ RRR. No murmurs, rubs, clicks, or gallops, distal pulses intact. Abdomen: soft, non-tender, non-distended, bowel sounds physiologic,  no rebound or guarding, no masses or hernias noted. Liver and spleen without enlargement. Extremities: no cyanosis, clubbing or edema of the lower extremities. Skin: warm and dry, no rash or erythema  Neuro Exam:   Cranial [id number] Intact.    Cranial nerve II: Normal Visual Acuity   Cranial nerve III: Pupils: equal, round, reactive to light   Cranial nerves III, IV, VI: Extraocular Movements: intact   Cranial nerve V: Facial sensation: intact   Cranial nerve VII:Facial strength: intact   Cranial nerve VIII: Hearing: intact   Cranial nerve IX: Palate Elevation intact bilaterally  Cranial nerve XI: Shoulder shrug intact bilaterally  Cranial nerve XII: Tongue movement: normal     Muscle Strength Testing    Deltoid Right 5/5  Left 5/5  Biceps Right 5/5  Left 5/5  Triceps Right 5/5  Left 5/5  Wrist Extensors Right  5/5  Left 5/5   Flexor Digitorum Profundus Right 5/5  Left 5/5  Hand Intrinsics Right 5/5  Left 5/5  Hip Flexors Right 5/5  Left 5/5  Quadriceps Right 5/5  Left 5/5  Anterior Tibialis Right 5/5  Left 5/5  Extensor Hallucis Longus Right 5/5  Left 5/5  Gastrocnemius/Soleus Right 5/5  Left 5/5     Sensory Testing    C5 Right 0=Normal; no sensory loss Left 0=Normal; no sensory loss  C6 Right 0=Normal; no sensory loss Left 0=Normal; no sensory loss  C7 Right 0=Normal; no sensory loss Left 0=Normal; no sensory loss  C8 Right 0=Normal; no sensory loss Left 0=Normal; no sensory loss  T1 Right 0=Normal; no sensory loss Left 0=Normal; no sensory loss    L2 Right 0=Normal; no sensory loss Left 0=Normal; no sensory loss  L3 Right 0=Normal; no sensory loss Left 0=Normal; no sensory loss  L4 Right 0=Normal; no sensory loss Left 0=Normal; no sensory loss  L5 Right 0=Normal; no sensory loss Left 0=Normal; no sensory loss  S1 Right 0=Normal; no sensory loss Left 0=Normal; no sensory loss     Cerebellar Testing    Finger to Nose:  Right  WNL Yes;  Left WNL  Yes  Heel to Krishna WNL: Right  WNL  Yes;  Left WNL  Yes     Deep Tendon Reflexes 2/4 equal and symmetric throughout   Clonus:  No  Decreased arm swing No   Shuffling gait No  Narrow base of support No  Able to stand without using arms  Yes  Bradykinesia  No  Tremor No  Increased tone at wrist especially with opening/closing opposite hand  No  [place] hallpike: + to the R. Negative to the L  LUMBAR SPINE EXAM:  Examination of the Lumbar spine shows:  Deformity Absent . Soft Tissue Swelling Absent . Soft Tissue Tenderness Present. Midline Bone Tenderness Absent . Paraspinal Muscular Spasm Present. Previous Incisions Absent . Erythema Absent . Lumbar Flexion does not produce pain. Lumbar Extension does not produce pain. NEUROLOGICAL EXAM:  SLR     Left: Negative. Right Negative. DTR 2+ bilaterally  Parish's Sign Absent   Gait normal Heel/ Toe. Sensation to Touch normal    LE strength    Right Left   Hip Flexors 5/5 5/5   Hip Extensors 5/5 5/5   Knee Flexors 5/5 5/5   Knee Extensors 5/5 5/5   Ankle Flexors 5/5 5/5   Ankle Extensors 5/5 5/5   Extensor Hallicus Longus 5/5 5/5   Dorsiflexors 5/5 5/5     Sensation:  T12 100% 100%   L1 100% 100%   L2 100% 100%   L3 100% 100%   L4 100% 100%   L5 100% 100%   S1 100% 100%   S2 100% 100%   S3-S5 100% 100%       Narrative   PROCEDURE: XR LUMBAR SPINE (2-3 VIEWS)       CLINICAL INFORMATION: Chronic left-sided lower back pain. COMPARISON: No prior study. TECHNIQUE: AP and lateral views performed. FINDINGS:    POSTOPERATIVE FINDINGS: None. ALIGNMENT:    1. There is slight levoscoliosis of the lumbar spine. MINERALIZATION:    1. The bony structures are osteopenic. FRACTURE:    1.  There is mild anterior wedging of the T12 and L1 vertebral bodies with less than 15% loss of superior height. There is no retropulsion of the posterior vertebral body line at either level. 2. There is a compression fracture of the L2 vertebral body with approximately 15% loss of superior height. There is no retropulsion of the posterior vertebral body line. 3. There is a compression fracture of the L4 vertebral body with approximately 20% loss of superior height, most prominent centrally. There is no retropulsion of the posterior vertebral body line. DISC SPACES/FACET JOINTS:    1. There are paravertebral ossifications at T11-12, T12-L1 and L1-2 with preservation of the disc space heights. 2. There are small endplate spurs at O4-1 and L3-4 with preservation of the disc space heights. 3. The facet joints are unremarkable. PEDICLES/SACROILIAC JOINTS: Unremarkable. OBLIQUE PROJECTIONS: Not performed. FLEXION/EXTENSION LATERAL: Not performed. OTHER: None. Impression   1. Slight levoscoliosis of the lumbar spine. 2. The bony structures are osteopenic. 3. Multilevel compression fractures and mild at T12 and L1, 15% at L2 and 20% at L4. The age of these compression fractures is indeterminate however most likely chronic. MRI of the lumbar spine could be performed for additional evaluation if this would    benefit the clinical management of the patient. 4. Mild degenerative changes as described in the body the report. **This report has been created using voice recognition software. It may contain minor errors which are inherent in voice recognition technology. **       Final report electronically signed by Dr. Jyotsna Negron on 8/25/2020 3:47 PM       Narrative   #[id number] - DEXA BONE DENSITY AXIAL SKELETON   # MALE BONE DENSITY EVALUATION: 09/16/2020       CLINICAL DATA: Clinical risk for osteoporosis. RISK FACTORS:  race and history of previous fracture. Osteopenia. History of compression fx.          FINDINGS:    Bone density evaluation was performed 09/16/2020 on the right    femur neck using a Hologic unit. The BMD average for the exam is    0.939  g/cm2. The T-score is 0.10 and the Z-score is 1.20. This    matches the [address] [employer]'s criteria for normal bone    density and places the patient within normal limits of fracture    risk. An additional bone density evaluation was performed 09/16/2020 on    the left femur neck using a Hologic unit. The BMD average for    the exam is 0.912  g/cm2. The T-score is -0.10 and the Z-score is    1.00. This matches the [address] [employer]'s criteria for    normal bone density and places the patient within normal limits    of fracture risk. An additional bone density evaluation was performed 09/16/2020 on    the AP L1-L4 region of spine using a Hologic unit. The BMD    average for the exam is 1.178  g/cm2. The T-score is 1.00 and the    Z-score is 1.80. This matches the [address] [employer]'s    criteria for normal bone density and places the patient within    normal limits of fracture risk. This patient does not qualify for a FRAX assessment because T    scores are at or above -1.0. IMPRESSION: BONE DENSITY WITHIN NORMAL LIMITS   Patient is at normal risk for fracture. Dasha Yarbrough M.D.     rd/joe:9/16/2020 09:33:05         Imaging Technologist: Judith Leon RT(R)(M), [facility]  1. Benign paroxysmal positional vertigo of right ear    Improving  Con't HEP  F/u if not resolved and can send to vestibular rehab.      2. Acute left-sided low back pain without sciatica    Persistent if a bit worse  No alarm features, pt is concerned however  Exam reassuring    Plan:  Depomedrol IM x 1 and Toradol IM x 1 with good relief  5 days of prednisone starting tomorrow  Mobic x 30 days  Con't HEP refer to PT  Get updated xray  F/u after PT if no better and will likely move fwd with MRI    - predniSONE (DELTASONE) 20 MG tablet; Take 2 tablets by mouth daily for 5 days  Dispense: 10 tablet; Refill: 0  - XR LUMBAR SPINE (2-3 VIEWS); Future  - methylPREDNISolone acetate (DEPO-MEDROL) injection 80 mg  - ketorolac (TORADOL) injection 30 mg  - meloxicam (MOBIC) 7.5 MG tablet; Take 1 tablet by mouth daily  Dispense: 30 tablet; Refill: 0  - External Referral To Physical Therapy    3. Insomnia, unspecified type    Recurrent issue, not controlled at present  Sleep hygiene not bad, but could be better. Work to improve that. Trial Doxepin 6mg qhs prn  Pt to update me in 3 to 4 wks if not working well. - Doxepin HCl 6 MG TABS; Take 1 tablet by mouth nightly as needed (for sleep)  Dispense: 30 tablet; Refill: 2      DISPOSITION    Return if symptoms worsen or fail to improve. Gib Pick released without restrictions. Future Appointments   Date Time Provider Ju Urbina   3/14/2023  9:40 AM [address]     PATIENT COUNSELING    Barriers to learning and self management: none    Discussed use, benefit, and side effects of prescribed medications. Barriers to medication compliance addressed. All patient questions answered. Pt voiced understanding.        Electronically signed by Katlyn West DO on 1/23/2023 at 2:40 PM

## 2023-01-24 ENCOUNTER — HOSPITAL ENCOUNTER (OUTPATIENT)
Age: 68
Discharge: HOME | End: 2023-01-24
Payer: MEDICARE

## 2023-01-24 ENCOUNTER — HOSPITAL ENCOUNTER (OUTPATIENT)
Dept: GENERAL RADIOLOGY | Age: 68
Discharge: HOME | End: 2023-01-24
Payer: MEDICARE

## 2023-01-24 DIAGNOSIS — M54.50 ACUTE LEFT-SIDED LOW BACK PAIN WITHOUT SCIATICA: ICD-10-CM

## 2023-01-24 PROCEDURE — 72100 X-RAY EXAM L-S SPINE 2/3 VWS: CPT

## 2023-01-25 ENCOUNTER — TELEPHONE (OUTPATIENT)
Dept: FAMILY MEDICINE CLINIC | Age: 68
End: 2023-01-25

## 2023-01-25 DIAGNOSIS — G47.00 INSOMNIA, UNSPECIFIED TYPE: Primary | ICD-10-CM

## 2023-01-25 RX ORDER — DOXEPIN HYDROCHLORIDE 10 MG/1
10 CAPSULE ORAL NIGHTLY
Qty: 30 CAPSULE | Refills: 2 | Status: SHIPPED | OUTPATIENT
Start: 2023-01-25

## 2023-01-25 NOTE — TELEPHONE ENCOUNTER
----- Message from Mona Ruelas, DO sent at 1/24/2023  8:38 PM EST -----  Please pt know that xray shows chronic arthritic changes and old compression fracture that is unchanged  Con't with plan from office  Let me know if questions, thanks!

## 2023-02-01 ENCOUNTER — TELEPHONE (OUTPATIENT)
Dept: FAMILY MEDICINE CLINIC | Age: 68
End: 2023-02-01

## 2023-02-01 DIAGNOSIS — H81.11 BENIGN PAROXYSMAL POSITIONAL VERTIGO OF RIGHT EAR: Primary | ICD-10-CM

## 2023-02-01 NOTE — TELEPHONE ENCOUNTER
Not sure if they do vestibular rehab    Can you check with [facility] and see if they offer PT for vertigo? Let me know, thanks!

## 2023-02-01 NOTE — TELEPHONE ENCOUNTER
Diagnosis Orders   1.  Benign paroxysmal positional vertigo of right ear  External Referral To Physical Therapy        Future Appointments   Date Time Provider Ju Urbina   3/14/2023  9:40 AM [address] [facility]

## 2023-02-16 ENCOUNTER — TELEPHONE (OUTPATIENT)
Dept: FAMILY MEDICINE CLINIC | Age: 68
End: 2023-02-16

## 2023-02-16 DIAGNOSIS — R73.9 HYPERGLYCEMIA: ICD-10-CM

## 2023-02-16 DIAGNOSIS — R73.03 PREDIABETES: ICD-10-CM

## 2023-02-16 DIAGNOSIS — I10 PRIMARY HYPERTENSION: Primary | Chronic | ICD-10-CM

## 2023-02-16 NOTE — TELEPHONE ENCOUNTER
Pt due for fasting labs prior to next apt on 3/14/2023. Please call to have pt complete this. Thanks! ASSESSMENT & PLAN   Diagnosis Orders   1. Primary hypertension  CBC with Auto Differential    Comprehensive Metabolic Panel    Hemoglobin A1C    Lipid Panel    TSH with Reflex    Microalbumin / Creatinine Urine Ratio      2. Prediabetes  CBC with Auto Differential    Comprehensive Metabolic Panel    Hemoglobin A1C    Lipid Panel    TSH with Reflex    Microalbumin / Creatinine Urine Ratio      3.  Hyperglycemia  CBC with Auto Differential    Comprehensive Metabolic Panel    Hemoglobin A1C    Lipid Panel    TSH with Reflex    Microalbumin / Creatinine Urine Ratio        Future Appointments   Date Time Provider Ju Urbina   3/14/2023  9:40 AM [address] [facility]

## 2023-02-24 NOTE — TELEPHONE ENCOUNTER
Got note from pharmacy  They are asking to change the version of doxepin we were going to try d/t cost    I've made that change     Diagnosis Orders   1.  Insomnia, unspecified type  doxepin (SINEQUAN) 10 MG capsule        Medications Discontinued During This Encounter   Medication Reason    Doxepin HCl 6 MG TABS Cost of medication       Future Appointments   Date Time Provider Ju Urbina   3/14/2023  9:40 AM [address] [facility]
Left detailed msg on machine
normal...

## 2023-03-11 ENCOUNTER — NURSE ONLY (OUTPATIENT)
Dept: LAB | Age: 68
End: 2023-03-11

## 2023-03-11 DIAGNOSIS — I10 PRIMARY HYPERTENSION: Chronic | ICD-10-CM

## 2023-03-11 DIAGNOSIS — R73.9 HYPERGLYCEMIA: ICD-10-CM

## 2023-03-11 DIAGNOSIS — R73.03 PREDIABETES: ICD-10-CM

## 2023-03-11 LAB
ALBUMIN SERPL BCG-MCNC: 4.3 G/DL (ref 3.5–5.1)
ALP SERPL-CCNC: 72 U/L (ref 38–126)
ALT SERPL W/O P-5'-P-CCNC: 18 U/L (ref 11–66)
ANION GAP SERPL CALC-SCNC: 10 MEQ/L (ref 8–16)
AST SERPL-CCNC: 17 U/L (ref 5–40)
BASOPHILS ABSOLUTE: 0 THOU/MM3 (ref 0–0.1)
BASOPHILS NFR BLD AUTO: 0.6 %
BILIRUB SERPL-MCNC: 0.5 MG/DL (ref 0.3–1.2)
BUN SERPL-MCNC: 15 MG/DL (ref 7–22)
CALCIUM SERPL-MCNC: 9 MG/DL (ref 8.5–10.5)
CHLORIDE SERPL-SCNC: 100 MEQ/L (ref 98–111)
CHOLEST SERPL-MCNC: 172 MG/DL (ref 100–199)
CO2 SERPL-SCNC: 27 MEQ/L (ref 23–33)
CREAT SERPL-MCNC: 0.7 MG/DL (ref 0.4–1.2)
CREAT UR-MCNC: 213.2 MG/DL
DEPRECATED MEAN GLUCOSE BLD GHB EST-ACNC: 141 MG/DL (ref 70–126)
DEPRECATED RDW RBC AUTO: 42.8 FL (ref 35–45)
EOSINOPHIL NFR BLD AUTO: 1.5 %
EOSINOPHILS ABSOLUTE: 0.1 THOU/MM3 (ref 0–0.4)
ERYTHROCYTE [DISTWIDTH] IN BLOOD BY AUTOMATED COUNT: 13.1 % (ref 11.5–14.5)
GFR SERPL CREATININE-BSD FRML MDRD: > 60 ML/MIN/1.73M2
GLUCOSE SERPL-MCNC: 118 MG/DL (ref 70–108)
HBA1C MFR BLD HPLC: 6.7 % (ref 4.4–6.4)
HCT VFR BLD AUTO: 46.2 % (ref 42–52)
HDLC SERPL-MCNC: 37 MG/DL
HGB BLD-MCNC: 15.9 GM/DL (ref 14–18)
IMM GRANULOCYTES # BLD AUTO: 0.02 THOU/MM3 (ref 0–0.07)
IMM GRANULOCYTES NFR BLD AUTO: 0.4 %
LDLC SERPL CALC-MCNC: 117 MG/DL
LYMPHOCYTES ABSOLUTE: 1.5 THOU/MM3 (ref 1–4.8)
LYMPHOCYTES NFR BLD AUTO: 28.4 %
MCH RBC QN AUTO: 31.5 PG (ref 26–33)
MCHC RBC AUTO-ENTMCNC: 34.4 GM/DL (ref 32.2–35.5)
MCV RBC AUTO: 91.7 FL (ref 80–94)
MICROALBUMIN UR-MCNC: < 1.2 MG/DL
MICROALBUMIN/CREAT RATIO PNL UR: 6 MG/G (ref 0–30)
MONOCYTES ABSOLUTE: 0.6 THOU/MM3 (ref 0.4–1.3)
MONOCYTES NFR BLD AUTO: 11.7 %
NEUTROPHILS NFR BLD AUTO: 57.4 %
NRBC BLD AUTO-RTO: 0 /100 WBC
PLATELET # BLD AUTO: 289 THOU/MM3 (ref 130–400)
PMV BLD AUTO: 10 FL (ref 9.4–12.4)
POTASSIUM SERPL-SCNC: 4.4 MEQ/L (ref 3.5–5.2)
PROT SERPL-MCNC: 7.2 G/DL (ref 6.1–8)
RBC # BLD AUTO: 5.04 MILL/MM3 (ref 4.7–6.1)
SEGMENTED NEUTROPHILS ABSOLUTE COUNT: 3.1 THOU/MM3 (ref 1.8–7.7)
SODIUM SERPL-SCNC: 137 MEQ/L (ref 135–145)
TRIGL SERPL-MCNC: 91 MG/DL (ref 0–199)
TSH SERPL DL<=0.005 MIU/L-ACNC: 2.25 UIU/ML (ref 0.4–4.2)
WBC # BLD AUTO: 5.4 THOU/MM3 (ref 4.8–10.8)

## 2023-03-13 ENCOUNTER — TELEPHONE (OUTPATIENT)
Dept: FAMILY MEDICINE CLINIC | Age: 68
End: 2023-03-13

## 2023-03-13 NOTE — PROGRESS NOTES
Chief Complaint   Patient presents with    Follow-up     Chronic issues noted below       History obtained from the patient. SUBJECTIVE:  Trista Del Cid is a 69 y.o. male that presents today for     -PreDM/Obesity:   A1c 6.7  Prior were <6.5  Wts the same  Diet poor yet  Working on changes    Wt Readings from Last 3 Encounters:   03/14/23 229 lb 3.2 oz (104 kg)   01/23/23 230 lb (104.3 kg)   01/16/23 227 lb (103 kg)     Lab Results   Component Value Date/Time    LABA1C 6.7 03/11/2023 08:21 AM    LABA1C 6.4 09/10/2022 08:16 AM    LABA1C 6.4 04/23/2022 06:03 AM    LABA1C 6.3 11/20/2021 08:40 AM    LABA1C 6.2 05/26/2021 08:26 AM    LABA1C 6.0 05/21/2020 08:51 AM       -HTN:    HPI:    Taking meds as prescribed ?: yes  Tolerating well ?: yes  Side Effects ?: denies  BP at home ?: <140/90  Working on TLCS ?: yes  Chest Pain/SOB/Palpitations? denies      -Vit D def: hx of vit d def, repeat labs WNL previsously. Doing well      -BPH/Elevated PSA:  Follows with urology at [facility]  S/p robotic assisted prostatectomy July 2021  Doing well overall  Still having some nocturia on and off  Overall doing better and happy with results. Will him again in OCT      -Vertigo PRIOR VISIT:  Started Friday  Comes and goes  When moves, lays on R ear or roll over, will feel like moving  Has had before  No tinnitus or hearing loss  No HA's  No trauma. UPDATE LAST VISIT:   Vertigo doing better  Still present but improving  Is doing HEP  No tinnitus or hearing loss  No HA's  No trauma. UPDATE TODAY:   Doing vestibular rehab  Vertigo 90% better  Has a few more sessions   No tinnitus or hearing loss  No HA's  No trauma.       -Low Back Pain PRIOR VISIT:     HPI:   Low   Stared Thursday  On L Side  No injury  Does not radiate  No radicular sxs  Better with rest  Worse with up, walking and bending  Pain is 3/10     Inciting injury or history of trauma?  No  Pain is relieved by - rest  Pain is aggravated by -  as above  Radiation of the pain? No  Paresthesias of the extremities?  no  Saddle anesthesia? No  Bowel or bladder incontinence? No  Treatments tried - tylenol     UPDATE LAST VISIT:   Saw a wk ago for above  Given HEP and Zanaflex  Feels back is getting worse  L Low back  No radiation  No radicular sxs  No bowel/bladder issues  No fevers, chills or night sweats.      UPDATE TODAY:   In PT  Working well  Pain improving  Happy with results  Denies bowel/bladder issues       -Insomnia LAST VISIT:  Chronic issue for him  Tried on trazodone OCT 2021  Did not help and had side effects  Doing various OTC meds, ineffective  Trouble falling and staying asleep  Denies depression/anxiety  Sleep hygiene ok    UPDATE TODAY:   Tried on doxepin 10mg qhs  Not helpful  Took 20mg, helped a little  Asking what else can do       Age/Gender Health Maintenance    Lipid -   Lab Results   Component Value Date    CHOL 172 03/11/2023    CHOL 166 04/23/2022    CHOL 166 05/26/2021     Lab Results   Component Value Date    TRIG 91 03/11/2023    TRIG 126 04/23/2022    TRIG 105 05/26/2021     Lab Results   Component Value Date    HDL 37 03/11/2023    HDL 33 04/23/2022    HDL 37 05/26/2021     Lab Results   Component Value Date    LDLCALC 117 03/11/2023    LDLCALC 108 04/23/2022    [address] 108 05/26/2021       DM Screen -   Lab Results   Component Value Date/Time    GLUCOSE 118 03/11/2023 08:21 AM    GLUCOSE 214 06/21/2021 12:23 PM     Lab Results   Component Value Date/Time    LABA1C 6.7 03/11/2023 08:21 AM    LABA1C 6.4 09/10/2022 08:16 AM    LABA1C 6.4 04/23/2022 06:03 AM    LABA1C 6.3 11/20/2021 08:40 AM    LABA1C 6.2 05/26/2021 08:26 AM    LABA1C 6.0 05/21/2020 08:51 AM       TSH -   Lab Results   Component Value Date    TSH 2.250 03/11/2023       Colon Cancer Screening - NEG Cologuard June 2020  Lung Cancer Screening - never smoker    Tetanus - UTD July 2015  Influenza Vaccine - UTD FALL 2022  Pneumonia Vaccine - UTD PCV 13 in AUG 2019/PPV 23 in AUG 2020  Zoster - UTD shingrix x 2    PSA testing discussion - Follows with urology at [facility]    Lab Results   Component Value Date    PSA 1.38 (H) 04/02/2022    PSA 6.53 (H) 10/07/2020    PSA 5.84 (H) 08/02/2019     AAA Screening - never smoker       Current Outpatient Medications   Medication Sig Dispense Refill    doxepin (SINEQUAN) 25 MG capsule Take 1-2 capsules by mouth nightly 60 capsule 2    tiZANidine (ZANAFLEX) 4 MG tablet Take 1 tablet by mouth every 8 hours as needed (Back pain/spasm) 30 tablet 0    losartan (COZAAR) 100 MG tablet TAKE 1 TABLET DAILY 90 tablet 3    amLODIPine (NORVASC) 10 MG tablet Take 1 tablet by mouth once daily 90 tablet 3    aspirin 81 MG chewable tablet Take 1 tablet by mouth daily 30 tablet 3    Calcium Carbonate (CALCIUM 600 PO) Take 1 tablet by mouth in the morning and at bedtime      vitamin D (CHOLECALCIFEROL) 25 MCG (1000 UT) TABS tablet Take 1,000 Units by mouth daily      ascorbic acid (VITAMIN C) 500 MG tablet Take 500 mg by mouth daily      zinc 50 MG CAPS Take by mouth      nystatin (MYCOSTATIN) 007265 UNIT/GM cream Apply topically as needed Apply topically 2 times daily. As needed      VIAGRA 100 MG TABS        No current facility-administered medications for this visit. Orders Placed This Encounter   Medications    doxepin (SINEQUAN) 25 MG capsule     Sig: Take 1-2 capsules by mouth nightly     Dispense:  60 capsule     Refill:  2         All medications reviewed and reconciled, including OTC and herbal medications. Updated list given to patient. Patient Active Problem List   Diagnosis    Dermatitis    Hypertension    Erectile dysfunction    BPH with elevated PSA    Prediabetes    Obesity (BMI 30-39. 9)    Insomnia       Past Medical History:   Diagnosis Date    BPH with elevated PSA 07/17/2015    Follows with urology for this and elevated PSA    Dermatitis     follows with dermDr. Jm    Erectile dysfunction     Hypertension     Marisol    Obesity (BMI 30-39. 9)     Prediabetes 07/29/2016       Past Surgical History:   Procedure Laterality Date    CARDIOVASCULAR STRESS TEST  2022    COLONOSCOPY  2007    Dr. Smith Manual N/A 02/17/2022    RIGHT INGUINAL HERNIA REPAIR WITH MESH performed by Gifford Scheuermann, DO at [address]  2005    Dr Garret Dalal laser ablation of prostate    PROSTATECTOMY N/A 07/01/2021    Robotic-assisted laparoscopic suprapubic prostatectomy @ [facility]    TRANSTHORACIC ECHOCARDIOGRAM  2022       No Known Allergies      Social History     Tobacco Use    Smoking status: Never    Smokeless tobacco: Never   Substance Use Topics    Alcohol use: No       Family History   Problem Relation Age of Onset    High Blood Pressure Mother     Cancer Father         Lung Cancer    Colon Cancer Neg Hx     Prostate Cancer Neg Hx          I have reviewed the patient's past medical history, past surgical history, allergies, medications, social and family history and I have made updates where appropriate.       Review of Systems  Positive responses are highlighted in bold    Constitutional:  Fever, Chills, Night Sweats, Fatigue, Unexpected changes in weight  HENT:  Ear pain, Tinnitus, Nosebleeds, Trouble swallowing, Hearing loss, Sore throat  Cardiovascular:  Chest Pain, Palpitations, Orthopnea, Paroxysmal Nocturnal Dyspnea  Respiratory:  Cough, Wheezing, Shortness of breath, Chest tightness, Apnea  Gastrointestinal:  Nausea, Vomiting, Diarrhea, Constipation, Heartburn, Blood in stool  Genitourinary:  Difficulty or painful urination, Flank pain, Change in frequency, Urgency  Skin:  Color change, Rash, Itching, Wound  Musculoskeletal:  Joint pain, Back pain, Gait problems, Joint swelling, Myalgias  Neurological:  Dizziness, Headaches, Presyncope, Numbness, Seizures, Tremors  Endocrine:  Heat Intolerance, Cold Intolerance, Polydipsia, Polyphagia, Polyuria      PHYSICAL EXAM:  Vitals:    03/14/23 0939   BP: 130/82   Pulse: 78   Resp: 16   Temp: 97.6 °F (36.4 °C) SpO2: 98%   Weight: 229 lb 3.2 oz (104 kg)   Height: 5' 10\" (1.778 m)     Body mass index is 32.89 kg/m². VS Reviewed  General Appearance: A&O x 3, No acute distress,well developed and well- nourished  Eyes: pupils equal, round, and reactive to light, extraocular eye movements intact, conjunctivae and eye lids without erythema  ENT: external ear and ear canal clear bilaterally, TMs intact and regular, nose without deformity, nasal mucosa and turbinates normal without polyps, oropharynx normal, dentition is normal for age  Neck: supple and non-tender without mass, no thyromegaly or thyroid nodules, no cervical lymphadenopathy  Pulmonary/Chest: clear to auscultation bilaterally- no wheezes, rales or rhonchi, normal air movement, no respiratory distress or retractions  Cardiovascular: S1 and S2 auscultated w/ RRR. No murmurs, rubs, clicks, or gallops, distal pulses intact. Abdomen: soft, non-tender, non-distended, bowel sounds physiologic,  no rebound or guarding, no masses or hernias noted. Liver and spleen without enlargement. Extremities: no cyanosis, clubbing or edema of the lower extremities. Skin: warm and dry, no rash or erythema  Neuro Exam:   Cranial [id number] Intact.    Cranial nerve II: Normal Visual Acuity   Cranial nerve III: Pupils: equal, round, reactive to light   Cranial nerves III, IV, VI: Extraocular Movements: intact   Cranial nerve V: Facial sensation: intact   Cranial nerve VII:Facial strength: intact   Cranial nerve VIII: Hearing: intact   Cranial nerve IX: Palate Elevation intact bilaterally  Cranial nerve XI: Shoulder shrug intact bilaterally  Cranial nerve XII: Tongue movement: normal      Muscle Strength Testing     Deltoid Right 5/5  Left 5/5  Biceps Right 5/5  Left 5/5  Triceps Right 5/5  Left 5/5  Wrist Extensors Right  5/5  Left 5/5   Flexor Digitorum Profundus Right 5/5  Left 5/5  Hand Intrinsics Right 5/5  Left 5/5  Hip Flexors Right 5/5  Left 5/5  Quadriceps Right 5/5 Left 5/5  Anterior Tibialis Right 5/5  Left 5/5  Extensor Hallucis Longus Right 5/5  Left 5/5  Gastrocnemius/Soleus Right 5/5  Left 5/5      Sensory Testing     C5 Right 0=Normal; no sensory loss Left 0=Normal; no sensory loss  C6 Right 0=Normal; no sensory loss Left 0=Normal; no sensory loss  C7 Right 0=Normal; no sensory loss Left 0=Normal; no sensory loss  C8 Right 0=Normal; no sensory loss Left 0=Normal; no sensory loss  T1 Right 0=Normal; no sensory loss Left 0=Normal; no sensory loss     L2 Right 0=Normal; no sensory loss Left 0=Normal; no sensory loss  L3 Right 0=Normal; no sensory loss Left 0=Normal; no sensory loss  L4 Right 0=Normal; no sensory loss Left 0=Normal; no sensory loss  L5 Right 0=Normal; no sensory loss Left 0=Normal; no sensory loss  S1 Right 0=Normal; no sensory loss Left 0=Normal; no sensory loss      Cerebellar Testing     Finger to Nose:  Right  WNL Yes;  Left WNL  Yes  Heel to Krishna WNL: Right  WNL  Yes;  Left WNL  Yes      Deep Tendon Reflexes 2/4 equal and symmetric throughout   Clonus:  No  Decreased arm swing No         Shuffling gait No  Narrow base of support No  Able to stand without using arms  Yes  Bradykinesia  No  Tremor No  Increased tone at wrist especially with opening/closing opposite hand  No  Mio hallpike: + to the R. Negative to the L  LUMBAR SPINE EXAM:  Examination of the Lumbar spine shows:  Deformity Absent . Soft Tissue Swelling Absent . Soft Tissue Tenderness Present. Midline Bone Tenderness Absent . Paraspinal Muscular Spasm Present. Previous Incisions Absent . Erythema Absent . Lumbar Flexion does not produce pain. Lumbar Extension does not produce pain. NEUROLOGICAL EXAM:  SLR     Left: Negative. Right Negative. DTR 2+ bilaterally  Parish's Sign Absent   Gait normal Heel/ Toe.   Sensation to Touch normal     LE strength     Right Left   Hip Flexors 5/5 5/5   Hip Extensors 5/5 5/5   Knee Flexors 5/5 5/5   Knee Extensors 5/5 5/5   Ankle Flexors 5/5 5/5   Ankle Extensors 5/5 5/5   Extensor Hallicus Longus 5/5 5/5   Dorsiflexors 5/5 5/5      Sensation:  T12 100% 100%   L1 100% 100%   L2 100% 100%   L3 100% 100%   L4 100% 100%   L5 100% 100%   S1 100% 100%   S2 100% 100%   S3-S5 100% 100%       Nurse Only on 03/11/2023   Component Date Value Ref Range Status    Microalbumin, Random Urine 03/11/2023 < 1.20  mg/dL Final    Creatinine, Urine 03/11/2023 213.2  mg/dL Final    Microalb/Creat Ratio 03/11/2023 6  0 - 30 mg/g Final    TSH 03/11/2023 2.250  0.400 - 4.200 uIU/mL Final    Cholesterol, Total 03/11/2023 172  100 - 199 mg/dL Final    Triglycerides 03/11/2023 91  0 - 199 mg/dL Final    HDL 03/11/2023 37  mg/dL Final    LDL Calculated 03/11/2023 117  mg/dL Final    Hemoglobin A1C 03/11/2023 6.7 (A)  4.4 - 6.4 % Final    AVERAGE GLUCOSE 03/11/2023 141 (A)  70 - 126 mg/dL Final    Glucose 03/11/2023 118 (A)  70 - 108 mg/dL Final    Creatinine 03/11/2023 0.7  0.4 - 1.2 mg/dL Final    BUN 03/11/2023 15  7 - 22 mg/dL Final    Sodium 03/11/2023 137  135 - 145 meq/L Final    Potassium 03/11/2023 4.4  3.5 - 5.2 meq/L Final    Chloride 03/11/2023 100  98 - 111 meq/L Final    CO2 03/11/2023 27  23 - 33 meq/L Final    Calcium 03/11/2023 9.0  8.5 - 10.5 mg/dL Final    AST 03/11/2023 17  5 - 40 U/L Final    Alkaline Phosphatase 03/11/2023 72  38 - 126 U/L Final    Total Protein 03/11/2023 7.2  6.1 - 8.0 g/dL Final    Albumin 03/11/2023 4.3  3.5 - 5.1 g/dL Final    Total Bilirubin 03/11/2023 0.5  0.3 - 1.2 mg/dL Final    ALT 03/11/2023 18  11 - 66 U/L Final    WBC 03/11/2023 5.4  4.8 - 10.8 thou/mm3 Final    RBC 03/11/2023 5.04  4.70 - 6.10 mill/mm3 Final    Hemoglobin 03/11/2023 15.9  14.0 - 18.0 gm/dl Final    Hematocrit 03/11/2023 46.2  42.0 - 52.0 % Final    MCV 03/11/2023 91.7  80.0 - 94.0 fL Final    MCH 03/11/2023 31.5  26.0 - 33.0 pg Final    MCHC 03/11/2023 34.4  32.2 - 35.5 gm/dl Final    RDW-CV 03/11/2023 13.1  11.5 - 14.5 % Final    RDW-SD 03/11/2023 42.8  35.0 -  45.0 fL Final    Platelets 57/46/2790 289  130 - 400 thou/mm3 Final    MPV 03/11/2023 10.0  9.4 - 12.4 fL Final    Seg Neutrophils 03/11/2023 57.4  % Final    Lymphocytes 03/11/2023 28.4  % Final    Monocytes 03/11/2023 11.7  % Final    Eosinophils 03/11/2023 1.5  % Final    Basophils 03/11/2023 0.6  % Final    Immature Granulocytes 03/11/2023 0.4  % Final    Segs Absolute 03/11/2023 3.1  1.8 - 7.7 thou/mm3 Final    Lymphocytes Absolute 03/11/2023 1.5  1.0 - 4.8 thou/mm3 Final    Monocytes Absolute 03/11/2023 0.6  0.4 - 1.3 thou/mm3 Final    Eosinophils Absolute 03/11/2023 0.1  0.0 - 0.4 thou/mm3 Final    Basophils Absolute 03/11/2023 0.0  0.0 - 0.1 thou/mm3 Final    Immature Grans (Abs) 03/11/2023 0.02  0.00 - 0.07 thou/mm3 Final    nRBC 03/11/2023 0  /100 wbc Final    Anion Gap 03/11/2023 10.0  8.0 - 16.0 meq/L Final    Est, Glom Filt Rate 03/11/2023 >60  >60 ml/min/1.73m2 Final       Narrative   PROCEDURE: XR LUMBAR SPINE (2-3 VIEWS)       CLINICAL INFORMATION: Chronic left-sided lower back pain. COMPARISON: No prior study. TECHNIQUE: AP and lateral views performed. FINDINGS:    POSTOPERATIVE FINDINGS: None. ALIGNMENT:    1. There is slight levoscoliosis of the lumbar spine. MINERALIZATION:    1. The bony structures are osteopenic. FRACTURE:    1. There is mild anterior wedging of the T12 and L1 vertebral bodies with less than 15% loss of superior height. There is no retropulsion of the posterior vertebral body line at either level. 2. There is a compression fracture of the L2 vertebral body with approximately 15% loss of superior height. There is no retropulsion of the posterior vertebral body line. 3. There is a compression fracture of the L4 vertebral body with approximately 20% loss of superior height, most prominent centrally. There is no retropulsion of the posterior vertebral body line. DISC SPACES/FACET JOINTS:    1.  There are paravertebral ossifications at T11-12, T12-L1 and L1-2 with preservation of the disc space heights. 2. There are small endplate spurs at W0-8 and L3-4 with preservation of the disc space heights. 3. The facet joints are unremarkable. PEDICLES/SACROILIAC JOINTS: Unremarkable. OBLIQUE PROJECTIONS: Not performed. FLEXION/EXTENSION LATERAL: Not performed. OTHER: None. Impression   1. Slight levoscoliosis of the lumbar spine. 2. The bony structures are osteopenic. 3. Multilevel compression fractures and mild at T12 and L1, 15% at L2 and 20% at L4. The age of these compression fractures is indeterminate however most likely chronic. MRI of the lumbar spine could be performed for additional evaluation if this would    benefit the clinical management of the patient. 4. Mild degenerative changes as described in the body the report. **This report has been created using voice recognition software. It may contain minor errors which are inherent in voice recognition technology. **       Final report electronically signed by Dr. Renetta Ortega on 8/25/2020 3:47 PM         Narrative   #[id number] - DEXA BONE DENSITY AXIAL SKELETON   # MALE BONE DENSITY EVALUATION: 09/16/2020       CLINICAL DATA: Clinical risk for osteoporosis. RISK FACTORS:  race and history of previous fracture. Osteopenia. History of compression fx. FINDINGS:    Bone density evaluation was performed 09/16/2020 on the right    femur neck using a Hologic unit. The BMD average for the exam is    0.939  g/cm2. The T-score is 0.10 and the Z-score is 1.20. This    matches the [address] [facility]'s criteria for normal bone    density and places the patient within normal limits of fracture    risk. An additional bone density evaluation was performed 09/16/2020 on    the left femur neck using a Hologic unit. The BMD average for    the exam is 0.912  g/cm2.  The T-score is -0.10 and the Z-score is 1.00.  This matches the [address] [facility] [facility]'s criteria for    normal bone density and places the patient within normal limits    of fracture risk. An additional bone density evaluation was performed 09/16/2020 on    the AP L1-L4 region of spine using a Hologic unit. The BMD    average for the exam is 1.178  g/cm2. The T-score is 1.00 and the    Z-score is 1.80. This matches the [address] [facility] [facility]'s    criteria for normal bone density and places the patient within    normal limits of fracture risk. This patient does not qualify for a FRAX assessment because T    scores are at or above -1.0. IMPRESSION: BONE DENSITY WITHIN NORMAL LIMITS   Patient is at normal risk for fracture. Adamaris Cosby M.D., rd/joe:9/16/2020 09:33:05         Imaging Technologist: Telma RUBI(BALAJI)(M), [address]  1. Prediabetes    Borderline type 2  Work on TLC/wt loss  FBS/a1c in 3 months. If still above 6.5, will pull back in office at 3 months and re-classify as DM2    - Hemoglobin A1C; Future  - Glucose, Random; Future    2. Hyperglycemia    - Hemoglobin A1C; Future  - Glucose, Random; Future    3. Obesity (BMI 30-39. 9)    Discussed wt loss strategies. 4. Essential hypertension    Stable  At goal  con't norvasc and cozaar  Labs UTD    5. Vitamin D deficiency    Stable  Con't supplementation    6. BPH with elevated PSA    Stable  Monitor sxs  F/u uro    7. Benign paroxysmal positional vertigo of right ear    Improving  Finish Vestibular rehab  If still having issues once done, f/u in office    8. Acute left-sided low back pain without sciatica    Improving  Finish PT  If still having issues once done, f/u in office    9. Insomnia, unspecified type    Trial Dozepin 25-50mg  If ineffective, call  Consider ambien 5mg at that point  Con't good sleep hygiene.     - doxepin (SINEQUAN) 25 MG capsule;  Take 1-2 capsules by mouth nightly  Dispense: 60 capsule; Refill: 2      DISPOSITION    Return in about 6 months (around 9/14/2023) for AWV, follow-up on chronic medical conditions, sooner as needed. Haja Ibarra released without restrictions. Future Appointments   Date Time Provider [place]   9/14/2023  9:40 AM Scott Favre, DO [facility]     PATIENT COUNSELING    Barriers to learning and self management: none    Discussed use, benefit, and side effects of prescribed medications. Barriers to medication compliance addressed. All patient questions answered. Pt voiced understanding.        Electronically signed by Scott Favre, DO on 3/14/2023 at 10:10 AM

## 2023-03-13 NOTE — TELEPHONE ENCOUNTER
----- Message from Pedro Rasmussen, DO sent at 3/12/2023  8:49 AM EDT -----  Please let pt know that labs are back:  Overall stable and appropriate  Blood sugar/a1c, though, concerning for transition from prediabetes to type 2 DM, though very well controlled. Will discuss further at f/u visit on 3/14  Let me know if questions, thanks!

## 2023-03-14 ENCOUNTER — OFFICE VISIT (OUTPATIENT)
Dept: FAMILY MEDICINE CLINIC | Age: 68
End: 2023-03-14

## 2023-03-14 VITALS
TEMPERATURE: 97.6 F | SYSTOLIC BLOOD PRESSURE: 130 MMHG | WEIGHT: 229.2 LBS | OXYGEN SATURATION: 98 % | DIASTOLIC BLOOD PRESSURE: 82 MMHG | HEART RATE: 78 BPM | BODY MASS INDEX: 32.81 KG/M2 | HEIGHT: 70 IN | RESPIRATION RATE: 16 BRPM

## 2023-03-14 DIAGNOSIS — R73.9 HYPERGLYCEMIA: ICD-10-CM

## 2023-03-14 DIAGNOSIS — H81.11 BENIGN PAROXYSMAL POSITIONAL VERTIGO OF RIGHT EAR: ICD-10-CM

## 2023-03-14 DIAGNOSIS — M54.50 ACUTE LEFT-SIDED LOW BACK PAIN WITHOUT SCIATICA: ICD-10-CM

## 2023-03-14 DIAGNOSIS — E66.9 OBESITY (BMI 30-39.9): ICD-10-CM

## 2023-03-14 DIAGNOSIS — G47.00 INSOMNIA, UNSPECIFIED TYPE: ICD-10-CM

## 2023-03-14 DIAGNOSIS — E55.9 VITAMIN D DEFICIENCY: ICD-10-CM

## 2023-03-14 DIAGNOSIS — I10 ESSENTIAL HYPERTENSION: ICD-10-CM

## 2023-03-14 DIAGNOSIS — R73.03 PREDIABETES: Primary | ICD-10-CM

## 2023-03-14 DIAGNOSIS — N40.0 BPH WITH ELEVATED PSA: ICD-10-CM

## 2023-03-14 DIAGNOSIS — R97.20 BPH WITH ELEVATED PSA: ICD-10-CM

## 2023-03-14 RX ORDER — DOXEPIN HYDROCHLORIDE 25 MG/1
25-50 CAPSULE ORAL NIGHTLY
Qty: 60 CAPSULE | Refills: 2 | Status: SHIPPED | OUTPATIENT
Start: 2023-03-14

## 2023-03-14 SDOH — ECONOMIC STABILITY: FOOD INSECURITY: WITHIN THE PAST 12 MONTHS, THE FOOD YOU BOUGHT JUST DIDN'T LAST AND YOU DIDN'T HAVE MONEY TO GET MORE.: NEVER TRUE

## 2023-03-14 SDOH — ECONOMIC STABILITY: INCOME INSECURITY: HOW HARD IS IT FOR YOU TO PAY FOR THE VERY BASICS LIKE FOOD, HOUSING, MEDICAL CARE, AND HEATING?: NOT HARD AT ALL

## 2023-03-14 SDOH — ECONOMIC STABILITY: FOOD INSECURITY: WITHIN THE PAST 12 MONTHS, YOU WORRIED THAT YOUR FOOD WOULD RUN OUT BEFORE YOU GOT MONEY TO BUY MORE.: NEVER TRUE

## 2023-03-14 SDOH — ECONOMIC STABILITY: HOUSING INSECURITY
IN THE LAST 12 MONTHS, WAS THERE A TIME WHEN YOU DID NOT HAVE A STEADY PLACE TO SLEEP OR SLEPT IN A SHELTER (INCLUDING NOW)?: NO

## 2023-03-14 NOTE — PATIENT INSTRUCTIONS
LAB INSTRUCTIONS:    Please complete labs IN 3 month(s). Please fast for 8 hours prior to lab collection. The clinic will call you within 1 week of collection. If you have not heard from us within that amount of time, please call us at [phone].

## 2023-04-21 DIAGNOSIS — I10 ESSENTIAL HYPERTENSION: ICD-10-CM

## 2023-04-21 RX ORDER — AMLODIPINE BESYLATE 10 MG/1
TABLET ORAL
Qty: 90 TABLET | Refills: 3 | Status: SHIPPED | OUTPATIENT
Start: 2023-04-21

## 2023-04-21 NOTE — TELEPHONE ENCOUNTER
Recent Visits  Date Type Provider Dept   03/14/23 Office Visit Leanord Bidding, DO [facility]   01/23/23 Office Visit Leanord Bidding, DO [facility]   01/16/23 Office Visit Leanord Bidding, DO [facility]   09/13/22 Office Visit Leanord Bidding, DO [facility]   07/15/22 Office Visit Leanord Bidding, DO [facility]   06/16/22 Office Visit Leanord Bidding, DO [facility]   05/05/22 Office Visit Leanord Bidding, DO [facility]   04/22/22 Office Visit Leanord Bidding, DO [facility]   12/17/21 Office Visit Leanord Bidding, DO [facility]   12/07/21 Office Visit Leanord Bidding, DO [facility]   Showing recent visits within past 540 days with a meds authorizing provider and meeting all other requirements  Future Appointments  Date Type Provider Dept   09/14/23 Appointment Leanord Bidding, DO [facility]   Showing future appointments within next 150 days with a meds authorizing provider and meeting all other requirements

## 2023-07-01 ENCOUNTER — NURSE ONLY (OUTPATIENT)
Dept: LAB | Age: 68
End: 2023-07-01

## 2023-07-01 DIAGNOSIS — R73.9 HYPERGLYCEMIA: ICD-10-CM

## 2023-07-01 DIAGNOSIS — R73.03 PREDIABETES: ICD-10-CM

## 2023-07-01 LAB
DEPRECATED MEAN GLUCOSE BLD GHB EST-ACNC: 132 MG/DL (ref 70–126)
GLUCOSE SERPL-MCNC: 104 MG/DL (ref 70–108)
HBA1C MFR BLD HPLC: 6.4 % (ref 4.4–6.4)

## 2023-07-02 DIAGNOSIS — R73.03 PREDIABETES: Primary | ICD-10-CM

## 2023-07-02 DIAGNOSIS — R73.9 HYPERGLYCEMIA: ICD-10-CM

## 2023-07-03 ENCOUNTER — TELEPHONE (OUTPATIENT)
Dept: FAMILY MEDICINE CLINIC | Age: 68
End: 2023-07-03

## 2023-07-03 NOTE — TELEPHONE ENCOUNTER
----- Message from Georgie Barry, DO sent at 7/2/2023  9:08 AM EDT -----  Please let pt know that a1c is a bit better, down to 6.4 from 6.7  Will plan to repeat this again in 3 months, fasting  Lab ordered  Con't with diet changes, exercise and wt loss  Let me know if questions, thanks!

## 2023-07-07 DIAGNOSIS — I10 ESSENTIAL HYPERTENSION: Chronic | ICD-10-CM

## 2023-07-07 RX ORDER — LOSARTAN POTASSIUM 100 MG/1
TABLET ORAL
Qty: 90 TABLET | Refills: 3 | Status: SHIPPED | OUTPATIENT
Start: 2023-07-07

## 2023-07-07 NOTE — TELEPHONE ENCOUNTER
Recent Visits  Date Type Provider Dept   03/14/23 Office Visit Madeleine Tinsley, DO [facility]   01/23/23 Office Visit Madeleine Tinsley, DO [facility]   01/16/23 Office Visit Madeleine Tinsley, DO [facility]   09/13/22 Office Visit Madeleine Tinsley, DO [facility]   07/15/22 Office Visit Madeleine Tinsley, DO [facility]   06/16/22 Office Visit Madeleine Tinsley, DO [facility]   05/05/22 Office Visit Madeleine Tinsley, DO [facility]   04/22/22 Office Visit Madeleine Tinsley, DO [facility]   Showing recent visits within past 540 days with a meds authorizing provider and meeting all other requirements  Future Appointments  Date Type Provider Dept   09/14/23 Appointment Madeleine Tinsley,  [facility]   Showing future appointments within next 150 days with a meds authorizing provider and meeting all other requirements

## 2023-08-25 DIAGNOSIS — G47.00 INSOMNIA, UNSPECIFIED TYPE: ICD-10-CM

## 2023-08-28 RX ORDER — DOXEPIN HYDROCHLORIDE 25 MG/1
CAPSULE ORAL
Qty: 60 CAPSULE | Refills: 3 | Status: SHIPPED | OUTPATIENT
Start: 2023-08-28

## 2023-08-28 NOTE — TELEPHONE ENCOUNTER
Recent Visits  Date Type Provider Dept   03/14/23 Office Visit Mesha Walls, DO [facility]   01/23/23 Office Visit Mesha Walls, DO [facility]   01/16/23 Office Visit Mesha Walls, DO [facility]   09/13/22 Office Visit Mseha Walls, DO [facility]   07/15/22 Office Visit Mesha Walls, DO [facility]   06/16/22 Office Visit Mesha Walls, DO [facility]   05/05/22 Office Visit Mesha Walls, DO [facility]   04/22/22 Office Visit Mesha Walls, DO [facility]   Showing recent visits within past 540 days with a meds authorizing provider and meeting all other requirements  Future Appointments  Date Type Provider Dept   09/14/23 Appointment Mesha Walls, DO [facility]   Showing future appointments within next 150 days with a meds authorizing provider and meeting all other requirements     Future Appointments   Date Time Provider [address]   9/14/2023  9:40 AM Mesha Walls, [address] [place] [address] Dr Roberts

## 2023-09-08 SDOH — HEALTH STABILITY: PHYSICAL HEALTH: ON AVERAGE, HOW MANY DAYS PER WEEK DO YOU ENGAGE IN MODERATE TO STRENUOUS EXERCISE (LIKE A BRISK WALK)?: 5 DAYS

## 2023-09-08 SDOH — HEALTH STABILITY: PHYSICAL HEALTH: ON AVERAGE, HOW MANY MINUTES DO YOU ENGAGE IN EXERCISE AT THIS LEVEL?: 30 MIN

## 2023-09-08 ASSESSMENT — LIFESTYLE VARIABLES
HOW OFTEN DO YOU HAVE SIX OR MORE DRINKS ON ONE OCCASION: 1
HOW MANY STANDARD DRINKS CONTAINING ALCOHOL DO YOU HAVE ON A TYPICAL DAY: PATIENT DOES NOT DRINK
HOW OFTEN DO YOU HAVE A DRINK CONTAINING ALCOHOL: NEVER
HOW MANY STANDARD DRINKS CONTAINING ALCOHOL DO YOU HAVE ON A TYPICAL DAY: 0
HOW OFTEN DO YOU HAVE A DRINK CONTAINING ALCOHOL: 1

## 2023-09-08 ASSESSMENT — PATIENT HEALTH QUESTIONNAIRE - PHQ9
SUM OF ALL RESPONSES TO PHQ QUESTIONS 1-9: 0
SUM OF ALL RESPONSES TO PHQ QUESTIONS 1-9: 0
2. FEELING DOWN, DEPRESSED OR HOPELESS: 0
1. LITTLE INTEREST OR PLEASURE IN DOING THINGS: 0
SUM OF ALL RESPONSES TO PHQ QUESTIONS 1-9: 0
SUM OF ALL RESPONSES TO PHQ QUESTIONS 1-9: 0
SUM OF ALL RESPONSES TO PHQ9 QUESTIONS 1 & 2: 0

## 2023-09-09 ENCOUNTER — NURSE ONLY (OUTPATIENT)
Dept: LAB | Age: 68
End: 2023-09-09

## 2023-09-09 DIAGNOSIS — R73.9 HYPERGLYCEMIA: ICD-10-CM

## 2023-09-09 DIAGNOSIS — R73.03 PREDIABETES: ICD-10-CM

## 2023-09-09 LAB
DEPRECATED MEAN GLUCOSE BLD GHB EST-ACNC: 132 MG/DL (ref 70–126)
GLUCOSE SERPL-MCNC: 113 MG/DL (ref 70–108)
HBA1C MFR BLD HPLC: 6.4 % (ref 4.4–6.4)

## 2023-09-11 ENCOUNTER — TELEPHONE (OUTPATIENT)
Dept: FAMILY MEDICINE CLINIC | Age: 68
End: 2023-09-11

## 2023-09-11 NOTE — TELEPHONE ENCOUNTER
----- Message from Gen Eastman, DO sent at 9/10/2023  9:08 AM EDT -----  Please let pt know that labs remain stable in the pre-diabetic range  Will discuss at f/u visit 9/14  Let me know if questions, thanks!

## 2023-09-12 ENCOUNTER — TELEPHONE (OUTPATIENT)
Dept: FAMILY MEDICINE CLINIC | Age: 68
End: 2023-09-12

## 2023-09-12 DIAGNOSIS — K64.9 HEMORRHOIDS, UNSPECIFIED HEMORRHOID TYPE: Primary | ICD-10-CM

## 2023-09-12 NOTE — TELEPHONE ENCOUNTER
Spoke with Donato Swan's MA  She spoke with pt today and ordered pain relieving cream and referral for hemorrhoid surgery, and home remedies to try    ,  Phoned pt he states a rx was sent to [facility] in    He doesn't want to continue to miss work and doesn't know when he can get this rx    Pt states you sent him a medication in the past lidocaine that works good he is wanting to know if you can send this med to [facility]

## 2023-09-12 NOTE — TELEPHONE ENCOUNTER
Pt states he had a colonoscopy yesterday with Dr Mark Macdonald  The procedure went well but today his hemorrhoids are aggravated,its painful and hard to sit    Pt did call GI   Dr Mark Macdonald is out of the office and its been over an hour and nobody has returned his call

## 2023-09-12 NOTE — TELEPHONE ENCOUNTER
Diagnosis Orders   1.  Hemorrhoids, unspecified hemorrhoid type  pramoxine-hydrocortisone 1-2.5 % cream

## 2023-09-13 PROBLEM — G47.00 INSOMNIA: Chronic | Status: ACTIVE | Noted: 2021-12-06

## 2023-09-13 NOTE — PROGRESS NOTES
Surg Hx  Soc Hx  Fam Hx           Care plan reviewed with and given to patient.        Electronically signed by Melecio Sanchez DO on 9/14/2023 at 10:13 AM

## 2023-09-14 ENCOUNTER — OFFICE VISIT (OUTPATIENT)
Dept: FAMILY MEDICINE CLINIC | Age: 68
End: 2023-09-14
Payer: MEDICARE

## 2023-09-14 VITALS
RESPIRATION RATE: 16 BRPM | TEMPERATURE: 97.3 F | BODY MASS INDEX: 30.61 KG/M2 | HEIGHT: 70 IN | HEART RATE: 78 BPM | SYSTOLIC BLOOD PRESSURE: 130 MMHG | OXYGEN SATURATION: 98 % | WEIGHT: 213.8 LBS | DIASTOLIC BLOOD PRESSURE: 60 MMHG

## 2023-09-14 DIAGNOSIS — E55.9 VITAMIN D DEFICIENCY: ICD-10-CM

## 2023-09-14 DIAGNOSIS — E66.9 OBESITY (BMI 30-39.9): ICD-10-CM

## 2023-09-14 DIAGNOSIS — I10 ESSENTIAL HYPERTENSION: ICD-10-CM

## 2023-09-14 DIAGNOSIS — R97.20 BPH WITH ELEVATED PSA: ICD-10-CM

## 2023-09-14 DIAGNOSIS — R73.03 PREDIABETES: ICD-10-CM

## 2023-09-14 DIAGNOSIS — Z00.00 MEDICARE ANNUAL WELLNESS VISIT, SUBSEQUENT: Primary | ICD-10-CM

## 2023-09-14 DIAGNOSIS — N40.0 BPH WITH ELEVATED PSA: ICD-10-CM

## 2023-09-14 DIAGNOSIS — G47.00 INSOMNIA, UNSPECIFIED TYPE: ICD-10-CM

## 2023-09-14 PROCEDURE — 3078F DIAST BP <80 MM HG: CPT | Performed by: FAMILY MEDICINE

## 2023-09-14 PROCEDURE — 1123F ACP DISCUSS/DSCN MKR DOCD: CPT | Performed by: FAMILY MEDICINE

## 2023-09-14 PROCEDURE — 3075F SYST BP GE 130 - 139MM HG: CPT | Performed by: FAMILY MEDICINE

## 2023-09-14 PROCEDURE — 3017F COLORECTAL CA SCREEN DOC REV: CPT | Performed by: FAMILY MEDICINE

## 2023-09-14 PROCEDURE — G0439 PPPS, SUBSEQ VISIT: HCPCS | Performed by: FAMILY MEDICINE

## 2023-09-14 ASSESSMENT — PATIENT HEALTH QUESTIONNAIRE - PHQ9
SUM OF ALL RESPONSES TO PHQ9 QUESTIONS 1 & 2: 0
SUM OF ALL RESPONSES TO PHQ QUESTIONS 1-9: 0
SUM OF ALL RESPONSES TO PHQ QUESTIONS 1-9: 0
2. FEELING DOWN, DEPRESSED OR HOPELESS: 0
SUM OF ALL RESPONSES TO PHQ QUESTIONS 1-9: 0
1. LITTLE INTEREST OR PLEASURE IN DOING THINGS: 0
SUM OF ALL RESPONSES TO PHQ QUESTIONS 1-9: 0

## 2023-09-14 ASSESSMENT — LIFESTYLE VARIABLES
HOW OFTEN DO YOU HAVE A DRINK CONTAINING ALCOHOL: NEVER
HOW MANY STANDARD DRINKS CONTAINING ALCOHOL DO YOU HAVE ON A TYPICAL DAY: PATIENT DOES NOT DRINK

## 2023-10-12 ENCOUNTER — NURSE ONLY (OUTPATIENT)
Dept: FAMILY MEDICINE CLINIC | Age: 68
End: 2023-10-12
Payer: MEDICARE

## 2023-10-12 DIAGNOSIS — Z23 NEEDS FLU SHOT: Primary | ICD-10-CM

## 2023-10-12 PROCEDURE — G0008 ADMIN INFLUENZA VIRUS VAC: HCPCS | Performed by: FAMILY MEDICINE

## 2023-10-12 PROCEDURE — 90694 VACC AIIV4 NO PRSRV 0.5ML IM: CPT | Performed by: FAMILY MEDICINE

## 2023-10-12 NOTE — PROGRESS NOTES
Immunization(s) given during visit:    Immunizations Administered       Name Date Dose Route    Influenza, FLUAD, (age 72 y+), Adjuvanted, 0.5mL 10/12/2023 0.5 mL Intramuscular    Site: Deltoid- Left    Lot: [id number]    NDC: 33169-819-24

## 2023-10-12 NOTE — PROGRESS NOTES
Vaccine Information Sheet, \"Influenza - Inactivated\"  given to Sundeep Floss, or parent/legal guardian of  Sundeep Floss and verbalized understanding. Patient responses:    Have you ever had a reaction to a flu vaccine? No  Do you have an allergy to eggs, neomycin or polymixin? No  Do you have an allergy to Thimerosal, contact lens solution, or Merthiolate? No  Have you ever had Guillian Celina Syndrome? No  Do you have any current illness? No  Do you have a temperature above 100 degrees? No  Are you pregnant? No  If pregnant, permission obtained from physician? No  Do you have an active neurological disorder? No      Flu vaccine given per order. Please see immunization tab.

## 2023-12-07 ENCOUNTER — NURSE ONLY (OUTPATIENT)
Dept: LAB | Age: 68
End: 2023-12-07

## 2023-12-07 LAB — PSA SERPL-MCNC: 1.08 NG/ML (ref 0–1)

## 2024-01-14 DIAGNOSIS — G47.00 INSOMNIA, UNSPECIFIED TYPE: ICD-10-CM

## 2024-01-15 RX ORDER — DOXEPIN HYDROCHLORIDE 25 MG/1
CAPSULE ORAL
Qty: 60 CAPSULE | Refills: 3 | Status: SHIPPED | OUTPATIENT
Start: 2024-01-15

## 2024-01-15 NOTE — TELEPHONE ENCOUNTER
Future Appointments   Date Time Provider Department Center   3/21/2024 11:00 AM Patricio Almeida, DO [facility] [place]

## 2024-01-22 NOTE — ED NOTES
Detail Level: Simple Pt resting on cot at this time. No needs voiced. Will continue to monitor.       Kei Townsend RN  08/07/22 [id number]

## 2024-02-15 ENCOUNTER — TELEPHONE (OUTPATIENT)
Dept: FAMILY MEDICINE CLINIC | Age: 69
End: 2024-02-15

## 2024-02-15 DIAGNOSIS — R73.9 HYPERGLYCEMIA: ICD-10-CM

## 2024-02-15 DIAGNOSIS — I10 ESSENTIAL HYPERTENSION: Primary | ICD-10-CM

## 2024-02-15 DIAGNOSIS — I10 PRIMARY HYPERTENSION: ICD-10-CM

## 2024-02-15 NOTE — TELEPHONE ENCOUNTER
Pt due for fasting labs prior to next apt on 3/21/2024. Please call to have pt complete this. Thanks!    ASSESSMENT & PLAN   Diagnosis Orders   1. Essential hypertension  CBC with Auto Differential    Comprehensive Metabolic Panel    Hemoglobin A1C    Lipid Panel    TSH with Reflex    Microalbumin / Creatinine Urine Ratio      2. Hyperglycemia  Hemoglobin A1C      3. Primary hypertension  Hemoglobin A1C        Future Appointments   Date Time Provider Department Center   3/21/2024 11:00 AM [facility] [place]

## 2024-02-15 NOTE — TELEPHONE ENCOUNTER
Left message on answering machine requesting pt to call back at earliest convenience.   Lab slip mailed to pt

## 2024-02-24 DIAGNOSIS — I10 ESSENTIAL HYPERTENSION: Chronic | ICD-10-CM

## 2024-02-26 RX ORDER — LOSARTAN POTASSIUM 100 MG/1
100 TABLET ORAL DAILY
Qty: 90 TABLET | Refills: 3 | Status: SHIPPED | OUTPATIENT
Start: 2024-02-26

## 2024-02-26 NOTE — TELEPHONE ENCOUNTER
Recent Visits  Date Type Provider Dept   09/14/23 Office Visit Patricio Almeida, DO [facility]   03/14/23 Office Visit Patricio Almeida, DO [facility]   01/23/23 Office Visit Patricio Almeida, DO [facility]   01/16/23 Office Visit Patricio Almeida, DO [facility]   09/13/22 Office Visit Patricio Almeida, DO [facility]   Showing recent visits within past 540 days with a meds authorizing provider and meeting all other requirements  Future Appointments  Date Type Provider Dept   03/21/24 Appointment Patricio Almeida, DO [facility]   Showing future appointments within next 150 days with a meds authorizing provider and meeting all other requirements

## 2024-03-16 ENCOUNTER — NURSE ONLY (OUTPATIENT)
Dept: LAB | Age: 69
End: 2024-03-16

## 2024-03-16 DIAGNOSIS — R73.9 HYPERGLYCEMIA: ICD-10-CM

## 2024-03-16 DIAGNOSIS — I10 ESSENTIAL HYPERTENSION: ICD-10-CM

## 2024-03-16 DIAGNOSIS — I10 PRIMARY HYPERTENSION: ICD-10-CM

## 2024-03-16 LAB
ALBUMIN SERPL BCG-MCNC: 4.4 G/DL (ref 3.5–5.1)
ALP SERPL-CCNC: 70 U/L (ref 38–126)
ALT SERPL W/O P-5'-P-CCNC: 17 U/L (ref 11–66)
ANION GAP SERPL CALC-SCNC: 13 MEQ/L (ref 8–16)
AST SERPL-CCNC: 18 U/L (ref 5–40)
BASOPHILS ABSOLUTE: 0 THOU/MM3 (ref 0–0.1)
BASOPHILS NFR BLD AUTO: 0.4 %
BILIRUB SERPL-MCNC: 0.4 MG/DL (ref 0.3–1.2)
BUN SERPL-MCNC: 18 MG/DL (ref 7–22)
CALCIUM SERPL-MCNC: 9 MG/DL (ref 8.5–10.5)
CHLORIDE SERPL-SCNC: 101 MEQ/L (ref 98–111)
CHOLEST SERPL-MCNC: 148 MG/DL (ref 100–199)
CO2 SERPL-SCNC: 23 MEQ/L (ref 23–33)
CREAT SERPL-MCNC: 0.7 MG/DL (ref 0.4–1.2)
CREAT UR-MCNC: 141.8 MG/DL
DEPRECATED MEAN GLUCOSE BLD GHB EST-ACNC: 120 MG/DL (ref 70–126)
DEPRECATED RDW RBC AUTO: 43.5 FL (ref 35–45)
EOSINOPHIL NFR BLD AUTO: 1.3 %
EOSINOPHILS ABSOLUTE: 0.1 THOU/MM3 (ref 0–0.4)
ERYTHROCYTE [DISTWIDTH] IN BLOOD BY AUTOMATED COUNT: 13.1 % (ref 11.5–14.5)
GFR SERPL CREATININE-BSD FRML MDRD: > 60 ML/MIN/1.73M2
GLUCOSE SERPL-MCNC: 110 MG/DL (ref 70–108)
HBA1C MFR BLD HPLC: 6 % (ref 4.4–6.4)
HCT VFR BLD AUTO: 46.7 % (ref 42–52)
HDLC SERPL-MCNC: 35 MG/DL
HGB BLD-MCNC: 16.3 GM/DL (ref 14–18)
IMM GRANULOCYTES # BLD AUTO: 0.02 THOU/MM3 (ref 0–0.07)
IMM GRANULOCYTES NFR BLD AUTO: 0.4 %
LDLC SERPL CALC-MCNC: 100 MG/DL
LYMPHOCYTES ABSOLUTE: 1.3 THOU/MM3 (ref 1–4.8)
LYMPHOCYTES NFR BLD AUTO: 24.1 %
MCH RBC QN AUTO: 32 PG (ref 26–33)
MCHC RBC AUTO-ENTMCNC: 34.9 GM/DL (ref 32.2–35.5)
MCV RBC AUTO: 91.6 FL (ref 80–94)
MICROALBUMIN UR-MCNC: < 1.2 MG/DL
MICROALBUMIN/CREAT RATIO PNL UR: 8 MG/G (ref 0–30)
MONOCYTES ABSOLUTE: 0.6 THOU/MM3 (ref 0.4–1.3)
MONOCYTES NFR BLD AUTO: 11.5 %
NEUTROPHILS NFR BLD AUTO: 62.3 %
NRBC BLD AUTO-RTO: 0 /100 WBC
PLATELET # BLD AUTO: 265 THOU/MM3 (ref 130–400)
PMV BLD AUTO: 10.6 FL (ref 9.4–12.4)
POTASSIUM SERPL-SCNC: 4.3 MEQ/L (ref 3.5–5.2)
PROT SERPL-MCNC: 7.8 G/DL (ref 6.1–8)
RBC # BLD AUTO: 5.1 MILL/MM3 (ref 4.7–6.1)
SEGMENTED NEUTROPHILS ABSOLUTE COUNT: 3.4 THOU/MM3 (ref 1.8–7.7)
SODIUM SERPL-SCNC: 137 MEQ/L (ref 135–145)
TRIGL SERPL-MCNC: 64 MG/DL (ref 0–199)
TSH SERPL DL<=0.005 MIU/L-ACNC: 1.65 UIU/ML (ref 0.4–4.2)
WBC # BLD AUTO: 5.4 THOU/MM3 (ref 4.8–10.8)

## 2024-03-18 ENCOUNTER — TELEPHONE (OUTPATIENT)
Dept: FAMILY MEDICINE CLINIC | Age: 69
End: 2024-03-18

## 2024-03-18 NOTE — TELEPHONE ENCOUNTER
----- Message from Patricio Almeida DO sent at 3/17/2024  8:54 AM EDT -----  Please let pt know that labs look good  A1c down to 6.0 from 6.4  Con't lifestyle changes, they are working.   Let me know if questions, thanks!

## 2024-03-20 NOTE — PROGRESS NOTES
Chief Complaint   Patient presents with    Follow-up     Chronic issues noted below     History obtained from the patient.    SUBJECTIVE:  Raffaele Johns is a 68 y.o. male that presents today for     -PreDM/Obesity:   A1c down to 6.0 from 6.4  Wts stable  Diet better  More active.     Wt Readings from Last 3 Encounters:   03/21/24 96.6 kg (213 lb)   09/14/23 97 kg (213 lb 12.8 oz)   03/14/23 104 kg (229 lb 3.2 oz)     Lab Results   Component Value Date/Time    LABA1C 6.0 03/16/2024 09:48 AM    LABA1C 6.4 09/09/2023 09:17 AM    LABA1C 6.4 07/01/2023 09:37 AM    LABA1C 6.7 03/11/2023 08:21 AM    LABA1C 6.4 09/10/2022 08:16 AM    LABA1C 6.4 04/23/2022 06:03 AM       -HTN:    HPI:    Taking meds as prescribed ?: yes  Tolerating well ?: yes  Side Effects ?: denies  BP at home ?: <140/90  Working on TLCS ?: yes  Chest Pain/SOB/Palpitations? denies      -Vit D def: hx of vit d def, repeat labs WNL previsously. Doing well      -BPH/Elevated PSA:  Follows with urology at [facility]  S/p robotic assisted prostatectomy July 2021  Doing well overall  Still having some nocturia on and off  Overall doing better and happy with results.   Will him again in OCT      -Insomnia PRIOR VISIT:  Chronic issue for him  Tried on trazodone OCT 2021  Did not help and had side effects  Doing various OTC meds, ineffective  Trouble falling and staying asleep  Denies depression/anxiety  Sleep hygiene ok    UPDATE PRIOR VISIT:   Tried on doxepin 10mg qhs  Not helpful  Took 20mg, helped a little  Asking what else can do     UPDATE TODAY:   On Doxepin  Working better for him  Happy with resonse       -? Hearing loss:  Would like some hearing testing done      Age/Gender Health Maintenance    Lipid -   Lab Results   Component Value Date    CHOL 148 03/16/2024    CHOL 172 03/11/2023    CHOL 166 04/23/2022     Lab Results   Component Value Date    TRIG 64 03/16/2024    TRIG 91 03/11/2023    TRIG 126 04/23/2022     Lab Results   Component Value Date    HDL

## 2024-03-21 ENCOUNTER — OFFICE VISIT (OUTPATIENT)
Dept: FAMILY MEDICINE CLINIC | Age: 69
End: 2024-03-21

## 2024-03-21 VITALS
HEIGHT: 70 IN | OXYGEN SATURATION: 99 % | HEART RATE: 64 BPM | TEMPERATURE: 97.7 F | WEIGHT: 213 LBS | BODY MASS INDEX: 30.49 KG/M2 | RESPIRATION RATE: 18 BRPM | SYSTOLIC BLOOD PRESSURE: 134 MMHG | DIASTOLIC BLOOD PRESSURE: 78 MMHG

## 2024-03-21 DIAGNOSIS — N40.0 BPH WITH ELEVATED PSA: Chronic | ICD-10-CM

## 2024-03-21 DIAGNOSIS — R73.03 PREDIABETES: Primary | ICD-10-CM

## 2024-03-21 DIAGNOSIS — G47.00 INSOMNIA, UNSPECIFIED TYPE: ICD-10-CM

## 2024-03-21 DIAGNOSIS — H91.93 BILATERAL HEARING LOSS, UNSPECIFIED HEARING LOSS TYPE: ICD-10-CM

## 2024-03-21 DIAGNOSIS — I10 ESSENTIAL HYPERTENSION: ICD-10-CM

## 2024-03-21 DIAGNOSIS — E55.9 VITAMIN D DEFICIENCY: ICD-10-CM

## 2024-03-21 DIAGNOSIS — R97.20 BPH WITH ELEVATED PSA: Chronic | ICD-10-CM

## 2024-03-21 DIAGNOSIS — E66.9 OBESITY (BMI 30-39.9): Chronic | ICD-10-CM

## 2024-03-21 SDOH — ECONOMIC STABILITY: FOOD INSECURITY: WITHIN THE PAST 12 MONTHS, THE FOOD YOU BOUGHT JUST DIDN'T LAST AND YOU DIDN'T HAVE MONEY TO GET MORE.: NEVER TRUE

## 2024-03-21 SDOH — ECONOMIC STABILITY: FOOD INSECURITY: WITHIN THE PAST 12 MONTHS, YOU WORRIED THAT YOUR FOOD WOULD RUN OUT BEFORE YOU GOT MONEY TO BUY MORE.: NEVER TRUE

## 2024-03-21 SDOH — ECONOMIC STABILITY: INCOME INSECURITY: HOW HARD IS IT FOR YOU TO PAY FOR THE VERY BASICS LIKE FOOD, HOUSING, MEDICAL CARE, AND HEATING?: NOT HARD AT ALL

## 2024-03-21 ASSESSMENT — PATIENT HEALTH QUESTIONNAIRE - PHQ9
SUM OF ALL RESPONSES TO PHQ9 QUESTIONS 1 & 2: 0
SUM OF ALL RESPONSES TO PHQ QUESTIONS 1-9: 0
2. FEELING DOWN, DEPRESSED OR HOPELESS: NOT AT ALL
1. LITTLE INTEREST OR PLEASURE IN DOING THINGS: NOT AT ALL

## 2024-04-10 ENCOUNTER — HOSPITAL ENCOUNTER (OUTPATIENT)
Dept: AUDIOLOGY | Age: 69
Discharge: HOME | End: 2024-04-10
Payer: MEDICARE

## 2024-04-10 PROCEDURE — 92557 COMPREHENSIVE HEARING TEST: CPT | Performed by: AUDIOLOGIST

## 2024-04-10 PROCEDURE — 92567 TYMPANOMETRY: CPT | Performed by: AUDIOLOGIST

## 2024-04-10 NOTE — PROGRESS NOTES
AUDIOLOGICAL EVALUATION      REASON FOR TESTING:  Audiometric evaluation per the request of Dr. Almeida, due to the diagnosis of bilateral hearing loss. The patient's wife is concerned that he is experiencing hearing loss. The patient denies tinnitus, otalgia, otorrhea, dizziness, vertigo, a history of noise exposure and a previous history of ear issues. His mother did wearing hearing aids later in life.     OTOSCOPY: minimal cerumen, bilaterally     AUDIOGRAM        Reliability: Good    COMMENTS: Normal hearing at 250-1000 Hz sloping to a mild sensorineural hearing loss for the left ear. Normal hearing at 250-1000 Hz sloping to mild sensorineural hearing loss for the right ear. The right ear is poorer than the left ear at 8000 Hz. Speech reception thresholds are in good agreement with pure tone results in both ears.  Word recognition ability is excellent at 100% for the left ear and excellent at 100% for the right ear. Tympanometry revealed normal peak pressure and normal middle ear compliance for both ears.      RECOMMENDATION(S):   1- Repeat testing in 6-12 months to monitor for progression, sooner with any noticed change or new concerns.  2- Utilize hearing protection in noise.

## 2024-04-18 NOTE — TELEPHONE ENCOUNTER
No answer  Message was sent via [employer] Virtual Regular Visit    Verification of patient location: [place]    Patient is located at Home in the following state in which I hold an active license [place]      Assessment/Plan:    Problem List Items Addressed This Visit    None           Reason for visit is   Chief Complaint   Patient presents with    Virtual Regular Visit          Encounter provider Susu Husain    Provider located at [facility]  [address] [address]  [phone]      Recent Visits  Date Type Provider Dept   24 Telemedicine Susu [facility]   Showing recent visits within past 7 days and meeting all other requirements  Today's Visits  Date Type Provider Dept   [facility]   Showing today's visits and meeting all other requirements  Future Appointments  No visits were found meeting these conditions.  Showing future appointments within next 150 days and meeting all other requirements       The patient was identified by name and date of birth. Naresh Sarabia was informed that this is a telemedicine visit and that the visit is being conducted through the Epic Embedded platform. She agrees to proceed..  My office door was closed. No one else was in the room.  She acknowledged consent and understanding of privacy and security of the video platform. The patient has agreed to participate and understands they can discontinue the visit at any time.    Patient is aware this is a billable service.     Subjective  Naresh Sarabia is a 30 y.o. female pregnant patient .      HPI     Past Medical History:   Diagnosis Date    Asthma        Past Surgical History:   Procedure Laterality Date    LASIK Bilateral        Current Outpatient Medications   Medication Sig Dispense Refill    albuterol (PROVENTIL HFA,VENTOLIN HFA) 90 mcg/act inhaler       Aspirin 81 MG CAPS Take by mouth      Blood Glucose  "Monitoring Suppl (OneTouch Verio Flex System) w/Device KIT Test 4 times daily 1 kit 0    ferrous sulfate 324 (65 Fe) mg Take 1 tablet (324 mg total) by mouth daily before breakfast 90 tablet 1    FOLIC ACID PO Take by mouth      Lancets (OneTouch Delica Plus Lrxemr33A) MISC Test 4 times daily 100 each 5    Omega-3 Fatty Acids (FISH OIL CONCENTRATE PO) Take by mouth      OneTouch Verio test strip Test 4 times daily 100 strip 5    Prenat MV-Min w/Fe-Folate-DHA (PRENATAL COMPLETE PO) Take by mouth       No current facility-administered medications for this visit.        Allergies   Allergen Reactions    Other Rash, Shortness Of Breath and Wheezing       Review of Systems    Video Exam    There were no vitals filed for this visit.    Physical Exam --not performed.     Visit Time  Total Visit Duration: 60 minutes        Thank you for referring your patient to [facility] Maternal Fetal Medicine Diabetes in Pregnancy Program.     Naresh Sarabia is a  30 y.o. female who presents today for Individual  Class 2.  Patient is at 28w4d gestation, Estimated Date of Delivery: 7/7/24.     Reviewed and updated the following from patients medical record: PMH, Problem List, Allergies, and Current Medications.    Visit Diagnosis:  Diet controlled GDM    Additional Pregnancy Complications:  Obesity    Labs:    Lab Results   Component Value Date    [id number] 183 (H) 04/08/2024       No results found for: \"GLUF\", \"HOSTITW0AR\", \"XLFZNPF3YN\", \"PJIVYME0YC\"     No components found for: \"HGA1C\"    Medications:  No diabetes related medications    Anthropometrics:  Ht Readings from Last 3 Encounters:   04/15/24 4' 10.25\" (1.48 m)   03/29/24 4' 10.25\" (1.48 m)     Wt Readings from Last 3 Encounters:   04/15/24 76.2 kg (168 lb)   03/29/24 76.7 kg (169 lb)   10/11/16 59.4 kg (130 lb 15.3 oz)     Pre-gravid weight: 153 pounds  Pre-gravid BMI: 30.9  Weight Change: gained 15 pounds   Weight gain recommendations: BMI (> 30) 11-20 lbs  Comments: " may gain 5 more pounds for the remainder of the pregnancy    Recent Ultra Sound Results:  Next US date: 24    Blood Glucose Monitoring:  Reinforcement of blood glucose goals and reporting guidelines.     Glucose Meter: OneTouch Verio Flex  Testing blood sugars: 4 x per day (Fasting, 2 hour after start of each meal)  Method of reporting blood sugars:My Chart Message    Report blood sugar results weekly via:  Phone: [phone]   OR  My Chart (Message with image attachment, or Glucose Flowsheet)    Goal Blood Sugar Ranges:   Fastin-90 mg/dL  1 hour after the start of each meal: 140 mg/dL or less  2 hours after start of each meal: 120 mg/dL or less      BG Log:  Review of blood glucose log. Discussed at this Class       Meal Plan:  Calories: 1800 calorie (CHO: 45-15-45-15-45-30) (PRO:2-1-3-1-3-2)    Diet Type: Low Carbohydrate  Additional Nutrition concerns: Drinks water infusions    24 hr Diet Recall:  Provided at this class.     Diet review: Eating 3 melas & 3 snacks. States she is still hungry with this meal plan. Diet recall indicates she is under eating at both lunch & dinner & missing protein at bedtime snack.. Missing 355 calories from her meal plan. Under eating  CHO foods at both lunch & dinner--reason for her hunger. Understand how to add more foods to both & dinner.  Works middle [employer] at [employer] in a job that works for her pregnancy.     Diet Instruction:  The patient was instructed on the following:  Individualized meal plan.   Importance of consistent carbohydrate intake via 3 meals and 3 snacks per day   Importance of protein as it relates to blood glucose control.   Encouraged  patient to eat every 2.0-3.5 hours while awake  Encouraged patient to go no longer than 8-10 hours fasting overnight until first meal of the day.  Provided suggested meal/snack options to increase nutrition and maintain consistent meal and snack intakes.      Physical Activity:  Discussed benefits of physical  "activity to optimize blood glucose control, encouraged activity at patient is physically able. Always consult a physician prior to starting an exercise program. Recommend 20-30 minutes daily.  Is patient physically active? Yes walks 20-30 minutes after dinner nightly    Sick day Guidelines:   Patient advised that sickness will raise blood sugar and need to continue medication regimen as directed. If blood sugar is > 160 mg/dL twice in one day call doctor. Instructed on what to do when unable to consume normal meal plan.     Hypoglycemia & Treatment Guidelines:  Reviewed what hypoglycemia is, signs and symptoms, and how to treat via the 15:15 rule.    Post-Partum Guidelines:  Completion of 75 gm CHO 2 hr gtt at 6 weeks post-partum to check for Type 2 DM diagnosis    Breastfeeding Guidelines:  Continue GDM meal plan plus additional 350-500 calories daily. Stay hydrated by drinking 8-10 (8 oz.) fluids daily. Examples of protein and carbohydrate snacks provided.    Dining Out & Travel Guidelines:  Patient advised to be prepared with extra diabetes supplies, medications, and snacks, as well as sticking to the same time schedule and portions eaten at home for meals and snacks.    Maternal-Fetal Testing:    Ultrasounds- growth scans every 4 weeks.   NST- twice weekly starting at 32nd week GA   JEN- weekly starting at 32 weeks GA    Patient Stated Goal: \"I will involve my family in my diabetes care\"  Goal Assessment: On track    Diabetes Self Management Support Plan outside of ongoing care: Spouse/Family    Learner/s Present:Learners Present: Patient   Barriers to Learning/Change: No Barriers  Expected Compliance: good    Date to report blood sugars: Weekly  Follow up date: as Needed    Begin Time: 10:10 AM  End Time: 11:15 AM    It was a pleasure working with them today. Please feel free to call with any questions or concerns.    Susu Husain, MS, RD, [facility]  Diabetes Educator  [facility] Maternal Fetal " Medicine  Diabetes in Pregnancy Program  [address], [address]  [place] [address]

## 2024-05-02 DIAGNOSIS — I10 ESSENTIAL HYPERTENSION: ICD-10-CM

## 2024-05-03 RX ORDER — AMLODIPINE BESYLATE 10 MG/1
TABLET ORAL
Qty: 90 TABLET | Refills: 3 | Status: SHIPPED | OUTPATIENT
Start: 2024-05-03

## 2024-05-03 NOTE — TELEPHONE ENCOUNTER
Recent Visits  Date Type Provider Dept   03/21/24 Office Visit Patricio Almeida, DO [facility]   09/14/23 Office Visit Patricio Almeida, DO [facility]   03/14/23 Office Visit Patricio Almeida, DO [facility]   01/23/23 Office Visit Patricio Almeida, DO [facility]   01/16/23 Office Visit Patricio Almeida, DO [facility]   Showing recent visits within past 540 days with a meds authorizing provider and meeting all other requirements  Future Appointments  Date Type Provider Dept   09/23/24 Appointment Patricio Almeida, DO [facility]   Showing future appointments within next 150 days with a meds authorizing provider and meeting all other requirements

## 2024-06-03 DIAGNOSIS — R23.9 SKIN CHANGE: Primary | ICD-10-CM

## 2024-08-29 ENCOUNTER — TELEPHONE (OUTPATIENT)
Dept: FAMILY MEDICINE CLINIC | Age: 69
End: 2024-08-29

## 2024-08-29 DIAGNOSIS — R73.9 HYPERGLYCEMIA: Primary | ICD-10-CM

## 2024-08-29 NOTE — TELEPHONE ENCOUNTER
Pt due for fasting labs prior to next apt on 9/23/2024. Please call to have pt complete this. Thanks!    ASSESSMENT & PLAN   Diagnosis Orders   1. Hyperglycemia  Glucose, Random    Hemoglobin A1C        Future Appointments   Date Time Provider Department Center   9/23/2024  1:00 PM Patricio Almeida, DO [facility] DEP

## 2024-08-29 NOTE — TELEPHONE ENCOUNTER
Per HIPAA left detailed message informing of labs needing completed prior to upcoming appt.  New lab orders mailed to pt.

## 2024-09-19 ENCOUNTER — LAB (OUTPATIENT)
Dept: LAB | Age: 69
End: 2024-09-19

## 2024-09-19 DIAGNOSIS — R73.9 HYPERGLYCEMIA: ICD-10-CM

## 2024-09-19 LAB
DEPRECATED MEAN GLUCOSE BLD GHB EST-ACNC: 132 MG/DL (ref 70–126)
GLUCOSE SERPL-MCNC: 111 MG/DL (ref 70–108)
HBA1C MFR BLD HPLC: 6.4 % (ref 4.4–6.4)

## 2024-09-19 SDOH — HEALTH STABILITY: PHYSICAL HEALTH: ON AVERAGE, HOW MANY MINUTES DO YOU ENGAGE IN EXERCISE AT THIS LEVEL?: 80 MIN

## 2024-09-19 SDOH — HEALTH STABILITY: PHYSICAL HEALTH: ON AVERAGE, HOW MANY DAYS PER WEEK DO YOU ENGAGE IN MODERATE TO STRENUOUS EXERCISE (LIKE A BRISK WALK)?: 4 DAYS

## 2024-09-19 ASSESSMENT — LIFESTYLE VARIABLES
HOW OFTEN DO YOU HAVE A DRINK CONTAINING ALCOHOL: NEVER
HOW OFTEN DO YOU HAVE SIX OR MORE DRINKS ON ONE OCCASION: 1
HOW MANY STANDARD DRINKS CONTAINING ALCOHOL DO YOU HAVE ON A TYPICAL DAY: 0
HOW MANY STANDARD DRINKS CONTAINING ALCOHOL DO YOU HAVE ON A TYPICAL DAY: PATIENT DOES NOT DRINK
HOW OFTEN DO YOU HAVE A DRINK CONTAINING ALCOHOL: 1

## 2024-09-19 ASSESSMENT — PATIENT HEALTH QUESTIONNAIRE - PHQ9
SUM OF ALL RESPONSES TO PHQ QUESTIONS 1-9: 0
SUM OF ALL RESPONSES TO PHQ9 QUESTIONS 1 & 2: 0
1. LITTLE INTEREST OR PLEASURE IN DOING THINGS: NOT AT ALL
2. FEELING DOWN, DEPRESSED OR HOPELESS: NOT AT ALL

## 2024-09-20 ENCOUNTER — TELEPHONE (OUTPATIENT)
Dept: FAMILY MEDICINE CLINIC | Age: 69
End: 2024-09-20

## 2024-09-23 ENCOUNTER — OFFICE VISIT (OUTPATIENT)
Dept: FAMILY MEDICINE CLINIC | Age: 69
End: 2024-09-23
Payer: MEDICARE

## 2024-09-23 VITALS
TEMPERATURE: 97 F | HEART RATE: 100 BPM | RESPIRATION RATE: 18 BRPM | OXYGEN SATURATION: 99 % | SYSTOLIC BLOOD PRESSURE: 138 MMHG | HEIGHT: 70 IN | WEIGHT: 211.2 LBS | BODY MASS INDEX: 30.24 KG/M2 | DIASTOLIC BLOOD PRESSURE: 80 MMHG

## 2024-09-23 DIAGNOSIS — R97.20 BPH WITH ELEVATED PSA: Chronic | ICD-10-CM

## 2024-09-23 DIAGNOSIS — Z00.00 MEDICARE ANNUAL WELLNESS VISIT, SUBSEQUENT: Primary | ICD-10-CM

## 2024-09-23 DIAGNOSIS — R73.03 PREDIABETES: Chronic | ICD-10-CM

## 2024-09-23 DIAGNOSIS — G89.29 CHRONIC FOOT PAIN, UNSPECIFIED LATERALITY: Primary | ICD-10-CM

## 2024-09-23 DIAGNOSIS — M79.673 CHRONIC FOOT PAIN, UNSPECIFIED LATERALITY: Primary | ICD-10-CM

## 2024-09-23 DIAGNOSIS — Z23 NEEDS FLU SHOT: ICD-10-CM

## 2024-09-23 DIAGNOSIS — E66.9 OBESITY (BMI 30-39.9): Chronic | ICD-10-CM

## 2024-09-23 DIAGNOSIS — G47.00 INSOMNIA, UNSPECIFIED TYPE: ICD-10-CM

## 2024-09-23 DIAGNOSIS — E55.9 VITAMIN D DEFICIENCY: ICD-10-CM

## 2024-09-23 DIAGNOSIS — N40.0 BPH WITH ELEVATED PSA: Chronic | ICD-10-CM

## 2024-09-23 DIAGNOSIS — I10 ESSENTIAL HYPERTENSION: ICD-10-CM

## 2024-09-23 PROCEDURE — 90653 IIV ADJUVANT VACCINE IM: CPT | Performed by: FAMILY MEDICINE

## 2024-09-23 PROCEDURE — 3075F SYST BP GE 130 - 139MM HG: CPT | Performed by: FAMILY MEDICINE

## 2024-09-23 PROCEDURE — G0439 PPPS, SUBSEQ VISIT: HCPCS | Performed by: FAMILY MEDICINE

## 2024-09-23 PROCEDURE — 3079F DIAST BP 80-89 MM HG: CPT | Performed by: FAMILY MEDICINE

## 2024-09-23 PROCEDURE — 1123F ACP DISCUSS/DSCN MKR DOCD: CPT | Performed by: FAMILY MEDICINE

## 2024-09-23 PROCEDURE — G0008 ADMIN INFLUENZA VIRUS VAC: HCPCS | Performed by: FAMILY MEDICINE

## 2024-12-04 ENCOUNTER — LAB (OUTPATIENT)
Dept: LAB | Age: 69
End: 2024-12-04

## 2024-12-05 LAB — PSA SERPL-MCNC: 1.44 NG/ML (ref 0–1)

## 2025-01-06 ENCOUNTER — PATIENT MESSAGE (OUTPATIENT)
Dept: FAMILY MEDICINE CLINIC | Age: 70
End: 2025-01-06

## 2025-01-06 DIAGNOSIS — R42 VERTIGO: Primary | ICD-10-CM

## 2025-01-08 NOTE — TELEPHONE ENCOUNTER
Spoke with pt  Sxs still c/w vertigo  Not improving with vestibular rehab  Will send to audio for VNG    He will f/u with me a week after VNG to go over results     Diagnosis Orders   1. Vertigo  [facility]    Audiometry with tympanometry    Videonystagmography        Future Appointments   Date Time Provider Department Center   3/24/2025 12:40 PM Patricio Almeida, DO [facility]

## 2025-02-24 ENCOUNTER — HOSPITAL ENCOUNTER (OUTPATIENT)
Dept: AUDIOLOGY | Age: 70
Discharge: HOME | End: 2025-02-24
Payer: MEDICARE

## 2025-02-24 PROCEDURE — 92540 BASIC VESTIBULAR EVALUATION: CPT | Performed by: AUDIOLOGIST

## 2025-02-24 PROCEDURE — 92557 COMPREHENSIVE HEARING TEST: CPT | Performed by: AUDIOLOGIST

## 2025-02-24 PROCEDURE — 92567 TYMPANOMETRY: CPT | Performed by: AUDIOLOGIST

## 2025-02-24 PROCEDURE — 92584 ELECTROCOCHLEOGRAPHY: CPT | Performed by: AUDIOLOGIST

## 2025-02-24 NOTE — PROGRESS NOTES
ACCOUNT #: [id number]                                    AUDIOLOGICAL EVALUATION WITH VNG      MEDICATIONS REVIEWED:  Patient has held appropriate medications for VNG testing.    REASON FOR TESTING: Audiometric evaluation and VNG testing per the request of Dr. Almeida, due to the diagnosis of vertigo. The patient explains that he had vertigo that started in October of 2024. He experienced random attacks of vertigo that lasted several seconds to several minutes. The vertigo was worse with quick head movements, bending over and rolling over in bed. He was treated for right ear posterior canal cupulothiasis at [facility]. He has not experienced any vertigo in the past two weeks. He denies any hearing loss, otalgia, tinnitus and aural pressure. He experiences \"stuffiness\" in the right ear on occasion.     Previous results on 4/10/24: Normal hearing at 250-1000 Hz sloping to a mild sensorineural hearing loss for the left ear. Normal hearing at 250-1000 Hz sloping to mild sensorineural hearing loss for the right ear. The right ear is poorer than the left ear at 8000 Hz. Speech reception thresholds are in good agreement with pure tone results in both ears.  Word recognition ability is excellent at 100% for the left ear and excellent at 100% for the right ear. Tympanometry revealed normal peak pressure and normal middle ear compliance for both ears.     OTOSCOPY: Otoscopy revealed partially occluding cerumen for the right ear and mostly occluding cerumen for the left ear. Attempted to removed the cerumen prior to testing, however, the cerumen could not be removed with a lighted curette.      AUDIOGRAM        Reliability: Good  Audiometer Used:  GSI-61      VNG RESULTS:    GAZE: WNL  SMOOTH PURSUIT: WNL  RANDOM SACCADE: WNL  OPTOKINETIC: WNL  SPONTANEOUS NYSTAGMUS:  NONE   MUMTAZ-HALLPIKE: WNL  POSITIONAL: Clinically insignificant low frequency right beating nystagmus of 2 deg/sec for head left and body

## 2025-02-26 ENCOUNTER — TELEPHONE (OUTPATIENT)
Dept: FAMILY MEDICINE CLINIC | Age: 70
End: 2025-02-26

## 2025-02-26 DIAGNOSIS — R73.9 HYPERGLYCEMIA: ICD-10-CM

## 2025-02-26 DIAGNOSIS — I10 ESSENTIAL HYPERTENSION: Primary | ICD-10-CM

## 2025-02-27 NOTE — TELEPHONE ENCOUNTER
Left message on answering machine. Requested pt to call back at [phone], at their earliest convenience.       Labs mailed to Pt

## 2025-02-27 NOTE — TELEPHONE ENCOUNTER
Pt due for fasting labs prior to next apt on 3/24/2025. Please call to have pt complete this. Thanks!    ASSESSMENT & PLAN   Diagnosis Orders   1. Essential hypertension  CBC with Auto Differential    Comprehensive Metabolic Panel    Hemoglobin A1C    Lipid Panel    TSH reflex to FT4    Albumin/Creatinine Ratio, Urine      2. Hyperglycemia  CBC with Auto Differential    Comprehensive Metabolic Panel    Hemoglobin A1C    Lipid Panel    TSH reflex to FT4    Albumin/Creatinine Ratio, Urine        Future Appointments   Date Time Provider Department Center   3/24/2025 12:40 PM Patricio Almeida,  [facility]   3/28/2025  3:00 PM VNG ROOM [facility] [place] [facility]

## 2025-03-04 DIAGNOSIS — I10 ESSENTIAL HYPERTENSION: Chronic | ICD-10-CM

## 2025-03-05 RX ORDER — LOSARTAN POTASSIUM 100 MG/1
100 TABLET ORAL DAILY
Qty: 90 TABLET | Refills: 3 | Status: SHIPPED | OUTPATIENT
Start: 2025-03-05

## 2025-03-05 NOTE — TELEPHONE ENCOUNTER
Recent Visits  Date Type Provider Dept   09/23/24 Office Visit Patricio Almeida, DO [facility]   03/21/24 Office Visit Patricio Almeida, DO [facility]   09/14/23 Office Visit Patricio Almeida, DO [facility]   Showing recent visits within past 540 days with a meds authorizing provider and meeting all other requirements  Future Appointments  Date Type Provider Dept   03/24/25 Appointment Patricio Almeida, DO [facility]   Showing future appointments within next 150 days with a meds authorizing provider and meeting all other requirements

## 2025-03-20 ENCOUNTER — LAB (OUTPATIENT)
Dept: LAB | Age: 70
End: 2025-03-20

## 2025-03-20 ENCOUNTER — RESULTS FOLLOW-UP (OUTPATIENT)
Dept: FAMILY MEDICINE CLINIC | Age: 70
End: 2025-03-20

## 2025-03-20 DIAGNOSIS — R73.9 HYPERGLYCEMIA: ICD-10-CM

## 2025-03-20 DIAGNOSIS — I10 ESSENTIAL HYPERTENSION: ICD-10-CM

## 2025-03-20 LAB
ALBUMIN SERPL BCG-MCNC: 4.2 G/DL (ref 3.4–4.9)
ALP SERPL-CCNC: 75 U/L (ref 40–129)
ALT SERPL W/O P-5'-P-CCNC: 28 U/L (ref 10–50)
ANION GAP SERPL CALC-SCNC: 11 MEQ/L (ref 8–16)
AST SERPL-CCNC: 23 U/L (ref 10–50)
BASOPHILS ABSOLUTE: 0 THOU/MM3 (ref 0–0.1)
BASOPHILS NFR BLD AUTO: 0.3 %
BILIRUB SERPL-MCNC: 0.4 MG/DL (ref 0.3–1.2)
BUN SERPL-MCNC: 17 MG/DL (ref 8–23)
CALCIUM SERPL-MCNC: 9.4 MG/DL (ref 8.8–10.2)
CHLORIDE SERPL-SCNC: 100 MEQ/L (ref 98–111)
CHOLEST SERPL-MCNC: 167 MG/DL (ref 100–199)
CO2 SERPL-SCNC: 27 MEQ/L (ref 22–29)
CREAT SERPL-MCNC: 0.8 MG/DL (ref 0.7–1.2)
CREAT UR-MCNC: 379 MG/DL
DEPRECATED MEAN GLUCOSE BLD GHB EST-ACNC: 132 MG/DL (ref 70–126)
DEPRECATED RDW RBC AUTO: 43.3 FL (ref 35–45)
EOSINOPHIL NFR BLD AUTO: 1 %
EOSINOPHILS ABSOLUTE: 0.1 THOU/MM3 (ref 0–0.4)
ERYTHROCYTE [DISTWIDTH] IN BLOOD BY AUTOMATED COUNT: 13.4 % (ref 11.5–14.5)
GFR SERPL CREATININE-BSD FRML MDRD: > 90 ML/MIN/1.73M2
GLUCOSE SERPL-MCNC: 116 MG/DL (ref 74–109)
HBA1C MFR BLD HPLC: 6.4 % (ref 4–6)
HCT VFR BLD AUTO: 44.7 % (ref 42–52)
HDLC SERPL-MCNC: 36 MG/DL
HGB BLD-MCNC: 15.5 GM/DL (ref 14–18)
IMM GRANULOCYTES # BLD AUTO: 0.02 THOU/MM3 (ref 0–0.07)
IMM GRANULOCYTES NFR BLD AUTO: 0.3 %
LDLC SERPL CALC-MCNC: 117 MG/DL
LYMPHOCYTES ABSOLUTE: 1.3 THOU/MM3 (ref 1–4.8)
LYMPHOCYTES NFR BLD AUTO: 21.4 %
MCH RBC QN AUTO: 31 PG (ref 26–33)
MCHC RBC AUTO-ENTMCNC: 34.7 GM/DL (ref 32.2–35.5)
MCV RBC AUTO: 89.4 FL (ref 80–94)
MICROALBUMIN UR-MCNC: 2.7 MG/DL
MICROALBUMIN/CREAT RATIO PNL UR: 7 MG/G (ref 0–30)
MONOCYTES ABSOLUTE: 0.6 THOU/MM3 (ref 0.4–1.3)
MONOCYTES NFR BLD AUTO: 10.7 %
NEUTROPHILS ABSOLUTE: 3.9 THOU/MM3 (ref 1.8–7.7)
NEUTROPHILS NFR BLD AUTO: 66.3 %
NRBC BLD AUTO-RTO: 0 /100 WBC
PLATELET # BLD AUTO: 316 THOU/MM3 (ref 130–400)
PMV BLD AUTO: 10.1 FL (ref 9.4–12.4)
POTASSIUM SERPL-SCNC: 4.3 MEQ/L (ref 3.5–5.2)
PROT SERPL-MCNC: 7.2 G/DL (ref 6.4–8.3)
RBC # BLD AUTO: 5 MILL/MM3 (ref 4.7–6.1)
SODIUM SERPL-SCNC: 138 MEQ/L (ref 135–145)
TRIGL SERPL-MCNC: 72 MG/DL (ref 0–199)
TSH SERPL DL<=0.05 MIU/L-ACNC: 1.64 UIU/ML (ref 0.27–4.2)
WBC # BLD AUTO: 5.9 THOU/MM3 (ref 4.8–10.8)

## 2025-03-21 ENCOUNTER — TELEPHONE (OUTPATIENT)
Dept: FAMILY MEDICINE CLINIC | Age: 70
End: 2025-03-21

## 2025-03-21 SDOH — ECONOMIC STABILITY: FOOD INSECURITY: WITHIN THE PAST 12 MONTHS, YOU WORRIED THAT YOUR FOOD WOULD RUN OUT BEFORE YOU GOT MONEY TO BUY MORE.: NEVER TRUE

## 2025-03-21 SDOH — ECONOMIC STABILITY: TRANSPORTATION INSECURITY
IN THE PAST 12 MONTHS, HAS LACK OF TRANSPORTATION KEPT YOU FROM MEETINGS, WORK, OR FROM GETTING THINGS NEEDED FOR DAILY LIVING?: NO

## 2025-03-21 SDOH — ECONOMIC STABILITY: INCOME INSECURITY: IN THE LAST 12 MONTHS, WAS THERE A TIME WHEN YOU WERE NOT ABLE TO PAY THE MORTGAGE OR RENT ON TIME?: NO

## 2025-03-21 SDOH — ECONOMIC STABILITY: TRANSPORTATION INSECURITY
IN THE PAST 12 MONTHS, HAS THE LACK OF TRANSPORTATION KEPT YOU FROM MEDICAL APPOINTMENTS OR FROM GETTING MEDICATIONS?: NO

## 2025-03-21 SDOH — ECONOMIC STABILITY: FOOD INSECURITY: WITHIN THE PAST 12 MONTHS, THE FOOD YOU BOUGHT JUST DIDN'T LAST AND YOU DIDN'T HAVE MONEY TO GET MORE.: NEVER TRUE

## 2025-03-21 ASSESSMENT — PATIENT HEALTH QUESTIONNAIRE - PHQ9
SUM OF ALL RESPONSES TO PHQ QUESTIONS 1-9: 0
2. FEELING DOWN, DEPRESSED OR HOPELESS: NOT AT ALL
2. FEELING DOWN, DEPRESSED OR HOPELESS: NOT AT ALL
SUM OF ALL RESPONSES TO PHQ QUESTIONS 1-9: 0
1. LITTLE INTEREST OR PLEASURE IN DOING THINGS: NOT AT ALL
SUM OF ALL RESPONSES TO PHQ QUESTIONS 1-9: 0
SUM OF ALL RESPONSES TO PHQ QUESTIONS 1-9: 0
SUM OF ALL RESPONSES TO PHQ9 QUESTIONS 1 & 2: 0
1. LITTLE INTEREST OR PLEASURE IN DOING THINGS: NOT AT ALL

## 2025-03-21 NOTE — TELEPHONE ENCOUNTER
Left detailed message results. Okay per HIPAA. Requested call back at [phone]  if they have any further questions.

## 2025-03-21 NOTE — TELEPHONE ENCOUNTER
Patricio Almeida,   P [facility] Clinical Staff    Please let pt know that labs overall are stable and appropriate.  A1c remains in the prediabetic range at 6.4, same as it was in SEPT 2024  Will discuss more at f/u visit next wk  Let me know if questions, thanks!

## 2025-03-23 SDOH — HEALTH STABILITY: PHYSICAL HEALTH: ON AVERAGE, HOW MANY MINUTES DO YOU ENGAGE IN EXERCISE AT THIS LEVEL?: 30 MIN

## 2025-03-23 SDOH — HEALTH STABILITY: PHYSICAL HEALTH: ON AVERAGE, HOW MANY DAYS PER WEEK DO YOU ENGAGE IN MODERATE TO STRENUOUS EXERCISE (LIKE A BRISK WALK)?: 3 DAYS

## 2025-03-23 ASSESSMENT — LIFESTYLE VARIABLES
HOW OFTEN DO YOU HAVE SIX OR MORE DRINKS ON ONE OCCASION: 1
HOW OFTEN DO YOU HAVE A DRINK CONTAINING ALCOHOL: 1
HOW OFTEN DO YOU HAVE A DRINK CONTAINING ALCOHOL: NEVER
HOW MANY STANDARD DRINKS CONTAINING ALCOHOL DO YOU HAVE ON A TYPICAL DAY: PATIENT DOES NOT DRINK
HOW MANY STANDARD DRINKS CONTAINING ALCOHOL DO YOU HAVE ON A TYPICAL DAY: 0

## 2025-03-23 NOTE — PROGRESS NOTES
Chief Complaint   Patient presents with    Medicare AWV    Follow-up     Chronic issues noted below     History obtained from the patient.    SUBJECTIVE:  Raffaele Johns is a 69 y.o. male that presents today for     -Vertigo:  Has been having issues with recurrent vertigo   Completed vestibular rehab  Completed VNG last wk, negative  Audio, wanted to do caloric teting, but needs ears cleaned 1st  No vertigo x 3 days      -PreDM/Obesity:   A1c at  6.4  Wts stable  Diet better  More active.     Wt Readings from Last 3 Encounters:   03/24/25 93.9 kg (207 lb)   09/23/24 95.8 kg (211 lb 3.2 oz)   03/21/24 96.6 kg (213 lb)     Lab Results   Component Value Date/Time    LABA1C 6.4 03/20/2025 11:41 AM    LABA1C 6.4 09/19/2024 10:38 AM    LABA1C 6.0 03/16/2024 09:48 AM    LABA1C 6.4 09/09/2023 09:17 AM    LABA1C 6.4 07/01/2023 09:37 AM    LABA1C 6.7 03/11/2023 08:21 AM       -HTN:    HPI:    Taking meds as prescribed ?: yes  Tolerating well ?: yes  Side Effects ?: denies  BP at home ?: <140/90  Working on TLCS ?: yes  Chest Pain/SOB/Palpitations? denies      -Vit D def: hx of vit d def, repeat labs WNL previsously. Doing well      -BPH/Elevated PSA:  Follows with urology at [facility]  S/p robotic assisted prostatectomy July 2021  Doing well overall  Still having some nocturia on and off  Overall doing better and happy with results.   Will see him again in DEC 2025      -Insomnia PRIOR VISIT:  Chronic issue for him  Tried on trazodone OCT 2021  Did not help and had side effects  Doing various OTC meds, ineffective  Trouble falling and staying asleep  Denies depression/anxiety  Sleep hygiene ok    UPDATE PRIOR VISIT:   Tried on doxepin 10mg qhs  Not helpful  Took 20mg, helped a little  Asking what else can do     UPDATE TODAY:   Was on Doxepin  Has weaned off  However, is sleeping fine w/o it  Uses occ Benadryl.       Age/Gender Health Maintenance    Lipid -   Lab Results   Component Value Date    CHOL 167 03/20/2025    CHOL 148

## 2025-03-24 ENCOUNTER — OFFICE VISIT (OUTPATIENT)
Dept: FAMILY MEDICINE CLINIC | Age: 70
End: 2025-03-24
Payer: MEDICARE

## 2025-03-24 VITALS
DIASTOLIC BLOOD PRESSURE: 80 MMHG | RESPIRATION RATE: 16 BRPM | HEART RATE: 77 BPM | SYSTOLIC BLOOD PRESSURE: 132 MMHG | TEMPERATURE: 97.9 F | WEIGHT: 207 LBS | HEIGHT: 70 IN | OXYGEN SATURATION: 98 % | BODY MASS INDEX: 29.63 KG/M2

## 2025-03-24 DIAGNOSIS — R42 VERTIGO: ICD-10-CM

## 2025-03-24 DIAGNOSIS — R97.20 BPH WITH ELEVATED PSA: Chronic | ICD-10-CM

## 2025-03-24 DIAGNOSIS — N40.0 BPH WITH ELEVATED PSA: Chronic | ICD-10-CM

## 2025-03-24 DIAGNOSIS — E66.9 OBESITY (BMI 30-39.9): Chronic | ICD-10-CM

## 2025-03-24 DIAGNOSIS — I10 ESSENTIAL HYPERTENSION: ICD-10-CM

## 2025-03-24 DIAGNOSIS — E55.9 VITAMIN D DEFICIENCY: ICD-10-CM

## 2025-03-24 DIAGNOSIS — Z00.00 MEDICARE ANNUAL WELLNESS VISIT, SUBSEQUENT: Primary | ICD-10-CM

## 2025-03-24 DIAGNOSIS — G47.00 INSOMNIA, UNSPECIFIED TYPE: ICD-10-CM

## 2025-03-24 DIAGNOSIS — R73.03 PREDIABETES: Chronic | ICD-10-CM

## 2025-03-24 PROCEDURE — 1160F RVW MEDS BY RX/DR IN RCRD: CPT | Performed by: FAMILY MEDICINE

## 2025-03-24 PROCEDURE — G0439 PPPS, SUBSEQ VISIT: HCPCS | Performed by: FAMILY MEDICINE

## 2025-03-24 PROCEDURE — 3079F DIAST BP 80-89 MM HG: CPT | Performed by: FAMILY MEDICINE

## 2025-03-24 PROCEDURE — 1159F MED LIST DOCD IN RCRD: CPT | Performed by: FAMILY MEDICINE

## 2025-03-24 PROCEDURE — 1123F ACP DISCUSS/DSCN MKR DOCD: CPT | Performed by: FAMILY MEDICINE

## 2025-03-24 PROCEDURE — 3075F SYST BP GE 130 - 139MM HG: CPT | Performed by: FAMILY MEDICINE

## 2025-03-28 ENCOUNTER — HOSPITAL ENCOUNTER (OUTPATIENT)
Dept: AUDIOLOGY | Age: 70
Discharge: HOME | End: 2025-03-28
Payer: MEDICARE

## 2025-03-28 PROCEDURE — 92537 CALORIC VSTBLR TEST W/REC: CPT | Performed by: AUDIOLOGIST

## 2025-03-28 NOTE — PROGRESS NOTES
ACCOUNT #: [id number]      VNG REPORT      CHIEF COMPLAINT: Caloric testing per the request of Dr. Almeida, due to the diagnosis of vertigo. VNG testing and audiometry was completed on 2/24/25. The patient had partially occluding cerumen for the right ear and mostly occluding cerumen for the left ear, therefore caloric testing could not be completed. He returns today to complete caloric irrigations. Per previous notes, Mr. Johns started to have episodes of vertigo in October of 2024. He had random attacks of vertigo that lasted several seconds to several minutes. The vertigo was worse with quick head movements, bending over and rolling over in bed. He was treated for right ear posterior canal cupulothiasis at [facility]. He has not experienced any vertigo since his treatment. For a few weeks after treatment, he had some dizziness upon sitting up in the morning, but that has been resolved for the past few weeks. He denies any hearing loss, otalgia, tinnitus and aural pressure. The previously reported \"stuffiness\" for the right ear has resolved as well.      Previous results on 2/24/25: Pure tone audiometry revealed normal hearing sensitivity sloping to moderate high frequency sensorineural hearing loss for the right ear and mild sloping to moderate sensorineural hearing loss for the right ear. There is a conductive component at 250Hz in the left ear. High frequency thresholds have declined for both ears, relative to 4/10/24 audiometry. Word recognition ability is excellent at 92% for the left ear and excellent at 96% for the right ear. Tympanometry revealed normal ear canal volume, normal peak pressure and normal middle ear compliance for both ears. VNG results are within normal limits. There was clinically insignificant right beating nystagmus (2 deg/sec SPV) for the head left and body left positions.           Previous results on 4/10/24: Normal hearing at 250-1000 Hz sloping to a mild

## 2025-04-24 DIAGNOSIS — I10 ESSENTIAL HYPERTENSION: ICD-10-CM

## 2025-04-25 RX ORDER — AMLODIPINE BESYLATE 10 MG/1
10 TABLET ORAL DAILY
Qty: 90 TABLET | Refills: 3 | Status: SHIPPED | OUTPATIENT
Start: 2025-04-25

## 2025-04-28 ENCOUNTER — PATIENT MESSAGE (OUTPATIENT)
Dept: FAMILY MEDICINE CLINIC | Age: 70
End: 2025-04-28

## 2025-04-28 DIAGNOSIS — H91.93 BILATERAL HEARING LOSS, UNSPECIFIED HEARING LOSS TYPE: Primary | ICD-10-CM

## 2025-07-22 ENCOUNTER — OFFICE VISIT (OUTPATIENT)
Dept: FAMILY MEDICINE CLINIC | Age: 70
End: 2025-07-22
Payer: MEDICARE

## 2025-07-22 VITALS
OXYGEN SATURATION: 95 % | HEIGHT: 70 IN | BODY MASS INDEX: 30.26 KG/M2 | HEART RATE: 85 BPM | TEMPERATURE: 98 F | DIASTOLIC BLOOD PRESSURE: 62 MMHG | RESPIRATION RATE: 16 BRPM | SYSTOLIC BLOOD PRESSURE: 122 MMHG | WEIGHT: 211.4 LBS

## 2025-07-22 DIAGNOSIS — J40 SINOBRONCHITIS: Primary | ICD-10-CM

## 2025-07-22 DIAGNOSIS — J32.9 SINOBRONCHITIS: Primary | ICD-10-CM

## 2025-07-22 LAB
Lab: NORMAL
QC PASS/FAIL: NORMAL
SARS-COV-2 RDRP RESP QL NAA+PROBE: NEGATIVE

## 2025-07-22 PROCEDURE — 1123F ACP DISCUSS/DSCN MKR DOCD: CPT | Performed by: FAMILY MEDICINE

## 2025-07-22 PROCEDURE — 1160F RVW MEDS BY RX/DR IN RCRD: CPT | Performed by: FAMILY MEDICINE

## 2025-07-22 PROCEDURE — 3074F SYST BP LT 130 MM HG: CPT | Performed by: FAMILY MEDICINE

## 2025-07-22 PROCEDURE — 87635 SARS-COV-2 COVID-19 AMP PRB: CPT | Performed by: FAMILY MEDICINE

## 2025-07-22 PROCEDURE — 3078F DIAST BP <80 MM HG: CPT | Performed by: FAMILY MEDICINE

## 2025-07-22 PROCEDURE — 1159F MED LIST DOCD IN RCRD: CPT | Performed by: FAMILY MEDICINE

## 2025-07-22 PROCEDURE — 99213 OFFICE O/P EST LOW 20 MIN: CPT | Performed by: FAMILY MEDICINE

## 2025-07-22 RX ORDER — DOXYCYCLINE HYCLATE 100 MG
100 TABLET ORAL 2 TIMES DAILY
Qty: 20 TABLET | Refills: 0 | Status: SHIPPED | OUTPATIENT
Start: 2025-07-22 | End: 2025-08-01

## 2025-07-22 RX ORDER — BENZONATATE 100 MG/1
CAPSULE ORAL
Qty: 60 CAPSULE | Refills: 0 | Status: SHIPPED | OUTPATIENT
Start: 2025-07-22 | End: 2025-08-01

## 2025-07-22 NOTE — PROGRESS NOTES
Chief Complaint   Patient presents with    Cough    Congestion     History obtained from the patient.    SUBJECTIVE:  Raffaele Johns is a 69 y.o. male that presents today for     -URI type sxs:   Started late last wk  Nasal congestion  Drainage  Cough  No fever  No SOB.   Covid negative.     Fever - No  Runny nose or congestion -  Yes   Cough -  Yes  Sore throat -  Yes  Headache, fatigue, joint pains, muscle aches -  No  Double Sickening - Yes  Shortness of breath/Wheezing? -  No  Nausea/Vomiting/Diarrhea?  No  Sick contacts - Yes  Maxillary Tooth Pain -  Yes  Treatment tried and response - otc meds, no better      Age/Gender Health Maintenance    Lipid -   Lab Results   Component Value Date    CHOL 167 03/20/2025    CHOL 148 03/16/2024    CHOL 172 03/11/2023     Lab Results   Component Value Date    TRIG 72 03/20/2025    TRIG 64 03/16/2024    TRIG 91 03/11/2023     Lab Results   Component Value Date    HDL 36 03/20/2025    HDL 35 03/16/2024    HDL 37 03/11/2023     Lab Results   Component Value Date     03/20/2025     03/16/2024     03/11/2023       DM Screen -   Lab Results   Component Value Date/Time    GLUCOSE 116 03/20/2025 11:41 AM    GLUCOSE 214 06/21/2021 12:23 PM     Lab Results   Component Value Date/Time    LABA1C 6.4 03/20/2025 11:41 AM    LABA1C 6.4 09/19/2024 10:38 AM    LABA1C 6.0 03/16/2024 09:48 AM    LABA1C 6.4 09/09/2023 09:17 AM    LABA1C 6.4 07/01/2023 09:37 AM    LABA1C 6.7 03/11/2023 08:21 AM       TSH -   Lab Results   Component Value Date    TSH 1.64 03/20/2025       Colon Cancer Screening - Negative colonoscopy SEPT 2023, repeat 10 years per S[facility] LARRY.   Lung Cancer Screening - never smoker    Tetanus - Last dose July 2015/to get at pharmacy per medicare rules  Influenza Vaccine - UTD FALL 2024  Pneumonia Vaccine - UTD PCV 13 in AUG 2019/PPV 23 in AUG 2020  Zoster - UTD shingrix x 2    PSA testing discussion - Follows with urology at [facility]    Lab Results   Component

## 2025-08-27 ENCOUNTER — TELEPHONE (OUTPATIENT)
Dept: FAMILY MEDICINE CLINIC | Age: 70
End: 2025-08-27

## 2025-08-27 DIAGNOSIS — R73.9 HYPERGLYCEMIA: Primary | ICD-10-CM

## 2025-08-27 DIAGNOSIS — I10 ESSENTIAL HYPERTENSION: ICD-10-CM

## 2025-08-27 DIAGNOSIS — E55.9 VITAMIN D DEFICIENCY: ICD-10-CM

## (undated) DEVICE — SUTURE PROL SZ 2-0 L30IN NONABSORBABLE BLU L26MM CT-1 1/2 8423H

## (undated) DEVICE — PENROSE DRAIN 18 X .5" SILICONE: Brand: MEDLINE

## (undated) DEVICE — GLOVE SURG SZ 75 L12IN FNGR THK94MIL TRNSLUC YEL LTX

## (undated) DEVICE — BREAST HERNIA PACK: Brand: MEDLINE INDUSTRIES, INC.

## (undated) DEVICE — SUTURE VCRL + SZ 3-0 L27IN ABSRB WHT CT-1 1/2 CIR VCP258H

## (undated) DEVICE — PENCIL SMK EVAC ALL IN 1 DSGN ENH VISIBILITY IMPROVED AIR

## (undated) DEVICE — SUTURE VCRL + SZ 4-0 L27IN ABSRB WHT FS-2 3/8 CIR REV CUT VCP422H

## (undated) DEVICE — SUTURE VCRL + SZ 2-0 L27IN ABSRB CLR CT-1 1/2 CIR TAPERCUT VCP259H